# Patient Record
Sex: FEMALE | Race: WHITE | NOT HISPANIC OR LATINO | Employment: FULL TIME | URBAN - METROPOLITAN AREA
[De-identification: names, ages, dates, MRNs, and addresses within clinical notes are randomized per-mention and may not be internally consistent; named-entity substitution may affect disease eponyms.]

---

## 2018-01-13 NOTE — PROGRESS NOTES
Assessment    1  Encounter for preventive health examination (V70 0) (Z00 00)   2  Vitamin D deficiency (268 9) (E55 9)    Plan  Encounter for PPD test    · PPD    Discussion/Summary  healthy adult female Currently, she eats an adequate diet  cervical cancer screening is current Breast cancer screening: breast cancer screening is not indicated  Colorectal cancer screening: colorectal cancer screening is not indicated  Osteoporosis screening: bone mineral density testing is not indicated  The immunizations are up to date  Advice and education were given regarding nutrition, aerobic exercise and vitamin D supplements  Patient discussion: discussed with the patient  1  Vitamin D deficiency  On daily supplement  2  Skin lesions, nevi  Sees dermatology yearly  3  HM  Updated  Pre employment paperwork completed  PPD today  Follow up in 1 year or prn  The patient was counseled regarding instructions for management, impressions  Chief Complaint  Pt is here for physical and to have TB test       History of Present Illness  , Adult Female: The patient is being seen for a health maintenance evaluation  General Health: The patient's health since the last visit is described as good  She has regular dental visits  She denies vision problems  Immunizations status: up to date  Lifestyle:  She consumes a diverse and healthy diet  She does not have any weight concerns  She does not exercise regularly  Reproductive health:  she reports normal menses  Screening: Cervical cancer screening includes a pap smear performed   Breast cancer screening includes no previous mammogram  She hasn't been previously screened for colorectal cancer  Metabolic screening includes lipid profile performed within the past five years, glucose screening performed 2013, thyroid function test performed 2013 and no previous DEXA  Cardiovascular risk factors: no obesity and no sedentary lifestyle     HPI: Kim Melgar feels well, has no complaints  Vitamin D Deficiency: The patient is being seen for follow-up of vitamin D deficiency  Disease type: vitamin D insufficiency  Current treatment includes vitamin D3 (cholecalciferol)  The patient is currently asymptomatic  Review of Systems    Constitutional: not feeling poorly and not feeling tired  Eyes: no eyesight problems  ENT: nasal discharge, but no earache  Cardiovascular: no chest pain, no palpitations and no lower extremity edema  Respiratory: no shortness of breath and no cough  Gastrointestinal: no abdominal pain and no constipation  Genitourinary: no dysuria and no incontinence  Musculoskeletal: no arthralgias and no myalgias  Neurological: no tingling and no dizziness  Psychiatric: no sleep disturbances  no feelings of weakness      Active Problems    1  Contact dermatitis due to plant (692 6) (L25 5)   2  Encounter for PPD test (V74 1) (Z11 1)   3  Palpitations (785 1) (R00 2)   4  Vitamin D deficiency (268 9) (E55 9)    Past Medical History    · History of Encounter for health supervision or care of foundling (V20 0) (Z76 1)   · History of acute bronchitis (V12 69) (Z87 09)   · History of Limb pain (729 5) (M79 609)    Family History  Maternal Grandfather    · Family history of Breast Cancer (V16 3)   · Family history of Malignant Melanoma Of The Skin (V16 8)  Paternal Grandfather    · Family history of Hypertension (V17 49)  Family History    · Family history of Diabetes Mellitus (V18 0)   · Family history of Heart Disease (V17 49)   · Family history of Stroke Syndrome (V17 1)    Social History    · Caffeine Use   ·    · Never A Smoker   · Never Drank Alcohol   · Work History   · applying for     Current Meds   1  Vitamin D3 2000 UNIT Oral Tablet; Take 1 tablet daily; Therapy: 85QZL8835 to (Evaluate:30Kek9905); Last Rx:29Jan2013 Ordered    Allergies    1   No Known Drug Allergies    Vitals   Recorded: [id number] 11:31AM   Heart Rate 75   Respiration 16   Systolic 953   Diastolic 80   Height 5 ft 11 in   Weight 153 lb 8 0 oz   BMI Calculated 21 41   BSA Calculated 1 88   O2 Saturation 96     Physical Exam    Constitutional   General appearance: No acute distress, well appearing and well nourished  appears healthy, comfortable, clothing appropriate and well hydrated  Head and Face   Head and face: Normal     Eyes   Pupils and irises: Equal, round, reactive to light  Ears, Nose, Mouth, and Throat   External inspection of ears and nose: Normal     Otoscopic examination: Tympanic membranes translucent with normal light reflex  Canals patent without erythema  Nasal mucosa, septum, and turbinates: Abnormal   There was clear rhinorrhea from both nares  The bilateral nasal mucosa was red  Oropharynx: Normal with no erythema, edema, exudate or lesions  Neck   Neck: Supple, symmetric, trachea midline, no masses  Thyroid: Normal, no thyromegaly  Pulmonary   Respiratory effort: No increased work of breathing or signs of respiratory distress  Auscultation of lungs: Clear to auscultation  Cardiovascular   Auscultation of heart: Normal rate and rhythm, normal S1 and S2, no murmurs  Examination of extremities for edema and/or varicosities: Normal     Abdomen   Abdomen: Non-tender, no masses  Lymphatic   Palpation of lymph nodes in neck: No lymphadenopathy  Musculoskeletal   Gait and station: Normal     Skin   Skin and subcutaneous tissue: Normal without rashes or lesions  Neurologic   Cortical function: Normal mental status      Psychiatric   Orientation to person, place, and time: Normal     Mood and affect: Normal        Results/Data  eCalcs - Health Calculators [id number] 11:33AM User, Apprisss     Test Name Result Flag Reference   SBIRT Screen - Tobacco Screening Result Negative       PHQ-2 Adult Depression Screening 13Jun2016 11:33AM User, Ahs     Test Name Result Flag Reference   PHQ-2 Adult Depression Score 0     Over the last two weeks, how often have you been bothered by any of the following problems?   Little interest or pleasure in doing things: Not at all - 0  Feeling down, depressed, or hopeless: Not at all - 0   PHQ-2 Adult Depression Screening Negative         Signatures   Electronically signed by : Robina Powell MD; Jun 13 2016 12:10PM EST                       (Author)

## 2018-08-29 ENCOUNTER — OFFICE VISIT (OUTPATIENT)
Dept: OBGYN CLINIC | Facility: CLINIC | Age: 28
End: 2018-08-29
Payer: COMMERCIAL

## 2018-08-29 VITALS
WEIGHT: 155 LBS | SYSTOLIC BLOOD PRESSURE: 126 MMHG | DIASTOLIC BLOOD PRESSURE: 72 MMHG | BODY MASS INDEX: 23.49 KG/M2 | HEIGHT: 68 IN

## 2018-08-29 DIAGNOSIS — O02.1 EMBRYONIC DEMISE: Primary | ICD-10-CM

## 2018-08-29 PROCEDURE — 99204 OFFICE O/P NEW MOD 45 MIN: CPT | Performed by: OBSTETRICS & GYNECOLOGY

## 2018-08-29 PROCEDURE — 76817 TRANSVAGINAL US OBSTETRIC: CPT | Performed by: OBSTETRICS & GYNECOLOGY

## 2018-08-29 NOTE — PROGRESS NOTES
Assessment/Plan:    No problem-specific Assessment & Plan notes found for this encounter  Diagnoses and all orders for this visit:    Embryonic demise    Other orders  -     Prenatal MV-Min-Fe Fum-FA-DHA (PRENATAL+DHA PO); Take by mouth        Extended discussion was had with this couple regarding the confirmatory findings of twin embryonic demise  We discussed multiple options including expectant management, misoprostol medical management as well as D and E  we discussed the pros and cons of each option  Patient remains a candidate for medical management and appears to be leaning in that direction  She notes that she is blood type Rh positive  We have planned to have her back in 48 hr to have placement of Cytotec and additional counseling at that time  She was advised that bleeding may be heavy but we did review the typical chronology of events and the risks of approaching this from a medical perspective  All of the questions seemed to be answered to their satisfaction and they will return as scheduled  This visit was for 45 min with greater than 50% that afforded counseling and coordination of care  Subjective:      Patient ID: Nell Price is a 34 y o  female  Patient presents as a new patient accompanied by her  for 2nd opinion  She is patient [facility] recently seen for a early twin pregnancy that appears to have a twin demise  She initially was found to have 2 embryos with cardiac activity at 5 weeks 4 days gestation  She was subsequently seen at [facility] 10 days later and was found to have twin embryonic demise  She was scheduled for D&E the following day  Patient  canceled that D&E as they were not emotionally prepared to proceed with surgical intervention and they had not felt that they been fully counseled regarding the our options  The patient continues to have pregnancy symptoms including mastalgia and nausea  She has no bleeding  She is doing well emotionally  The following portions of the patient's history were reviewed and updated as appropriate:   She  has no past medical history on file  She   Patient Active Problem List    Diagnosis Date Noted    Embryonic demise 08/29/2018     She  has a past surgical history that includes Finger surgery (Right, 06/2008)  Her family history includes Diabetes in her maternal grandfather and maternal grandmother; Hypertension in her maternal grandfather, maternal grandmother, paternal grandfather, and paternal grandmother; Migraines in her brother and mother; Skin cancer in her maternal grandfather and maternal grandmother  She  reports that she has never smoked  She has never used smokeless tobacco  She reports that she does not drink alcohol or use drugs  Current Outpatient Prescriptions   Medication Sig Dispense Refill    Prenatal MV-Min-Fe Fum-FA-DHA (PRENATAL+DHA PO) Take by mouth       No current facility-administered medications for this visit  No current outpatient prescriptions on file prior to visit  No current facility-administered medications on file prior to visit  She has No Known Allergies       Review of Systems   All other systems reviewed and are negative  Objective:      /72   Ht 5' 8 25" (1 734 m)   Wt 70 3 kg (155 lb)   LMP 06/10/2018 (Exact Date)   BMI 23 40 kg/m²          Physical Exam   Constitutional: She is oriented to person, place, and time  She appears well-developed and well-nourished  Neck: Neck supple  Abdominal: Soft  There is no tenderness  Genitourinary: Vagina normal and uterus normal    Genitourinary Comments: Cervix closed without lesions or bleeding   Neurological: She is alert and oriented to person, place, and time  Skin: Skin is warm and dry  Psychiatric: She has a normal mood and affect   Her behavior is normal  Judgment and thought content normal        Early OB Ultrasound Procedure Note  Ochoa Lubin Rafal  YOB: 1990      Referring Physician: Olaf Kirkpatrick MD     Technician: Study performed by the interpreting physician    Indications:  amenorrhea   /72   Ht 5' 8 25" (1 734 m)   Wt 70 3 kg (155 lb)   LMP 06/10/2018 (Exact Date)   BMI 23 40 kg/m²     Patient's last menstrual period was 06/10/2018 (exact date)  ,     Patient denies vaginal bleeding or brown discharge      Procedure Details  2 gestational sacs are  seen containing a yolk sac and twin embryos    Subchorionic lucency is absent    The embryonic crown-rump length measures 0 89  cm  and calculates to an estimated gestational age of 7 weeks and 6   Days    Second embryo is seen and measures 1 11cm c/w 7w2d EGA   Embryonic cardiac activity is  absent  Description of fetal anatomy Normal  Description of ovaries: normal  Description of uterus: normal    Findings:  Twin embryonic demise

## 2018-08-31 ENCOUNTER — OFFICE VISIT (OUTPATIENT)
Dept: OBGYN CLINIC | Facility: MEDICAL CENTER | Age: 28
End: 2018-08-31
Payer: COMMERCIAL

## 2018-08-31 VITALS — BODY MASS INDEX: 23.4 KG/M2 | SYSTOLIC BLOOD PRESSURE: 130 MMHG | DIASTOLIC BLOOD PRESSURE: 82 MMHG | WEIGHT: 155 LBS

## 2018-08-31 DIAGNOSIS — O02.1 EMBRYONIC DEMISE: Primary | ICD-10-CM

## 2018-08-31 PROCEDURE — 99214 OFFICE O/P EST MOD 30 MIN: CPT | Performed by: OBSTETRICS & GYNECOLOGY

## 2018-08-31 RX ORDER — MISOPROSTOL 200 UG/1
800 TABLET ORAL ONCE
Status: DISCONTINUED | OUTPATIENT
Start: 2018-08-31 | End: 2018-08-31

## 2018-08-31 RX ORDER — OXYCODONE HYDROCHLORIDE AND ACETAMINOPHEN 5; 325 MG/1; MG/1
1 TABLET ORAL EVERY 4 HOURS PRN
Qty: 3 TABLET | Refills: 0 | Status: SHIPPED | OUTPATIENT
Start: 2018-08-31 | End: 2019-01-25 | Stop reason: ALTCHOICE

## 2018-08-31 NOTE — PROGRESS NOTES
Assessment/Plan:      Diagnoses and all orders for this visit:    Embryonic demise          Patient had misoprostol 800 mcg placed  Intravaginally  The tablets were moist and  Patient was allowed to lie supine for 10 minutes in the office following placement  We did review the typical sequence of events  As well as precautions  The patient was given a sample of misoprostol 200 mcg to take home with her to take orally should she feel that her bleeding is excessive  Patient and  were given criteria as to when to call with concern  There is scheduled tentatively to return in 1 week for confirmatory ultrasound  This visit was for 25 minutes with greater than 50% of that afforded counseling and coordination of care  Subjective:     Patient ID: Nicky Luciano is a 34 y o  female  Patient returns with her  for placement of Cytotec  She remains interested in proceeding with medical management of her twin embryonic demise  She has had no bleeding or cramping but has noted a decline in her pregnancy symptoms  She is doing well emotionally  Review of Systems   All other systems reviewed and are negative  Objective:     Physical Exam   Constitutional: She is oriented to person, place, and time  She appears well-developed and well-nourished  Abdominal: Soft  There is no tenderness  Genitourinary: Vagina normal and uterus normal    Neurological: She is alert and oriented to person, place, and time  Skin: Skin is warm and dry  Psychiatric: She has a normal mood and affect   Her behavior is normal  Judgment and thought content normal

## 2018-09-07 ENCOUNTER — OFFICE VISIT (OUTPATIENT)
Dept: OBGYN CLINIC | Facility: MEDICAL CENTER | Age: 28
End: 2018-09-07
Payer: COMMERCIAL

## 2018-09-07 VITALS — WEIGHT: 163 LBS | DIASTOLIC BLOOD PRESSURE: 76 MMHG | BODY MASS INDEX: 24.6 KG/M2 | SYSTOLIC BLOOD PRESSURE: 110 MMHG

## 2018-09-07 DIAGNOSIS — O02.1 EMBRYONIC DEMISE: Primary | ICD-10-CM

## 2018-09-07 PROCEDURE — 99214 OFFICE O/P EST MOD 30 MIN: CPT | Performed by: OBSTETRICS & GYNECOLOGY

## 2018-09-07 PROCEDURE — 76817 TRANSVAGINAL US OBSTETRIC: CPT | Performed by: OBSTETRICS & GYNECOLOGY

## 2018-09-07 NOTE — PROGRESS NOTES
Assessment/Plan:      Diagnoses and all orders for this visit:    Embryonic demise        We discussed today's findings and the evidence of passage of the twin gestational sac  We discussed the likelihood that she will have some bleeding for the next 2-4 weeks and will then have initial menses in the next 6-8 weeks  Patient  do appeared to be interested in conceiving in the near future  I have suggested calling with her 1st positive home pregnancy test so that we may perform quantitative HCG and early confirmatory ultrasound  Patient was also counseled on initiation of progesterone supplementation with her next pregnancy and she was agreeable  She will return to the office in April for her annual visit or earlier if pregnant  Total time for this visit was 25 minutes with greater than 50% of that afforded counseling and coordination of care  Subjective:     Patient ID: Savita Shoemaker is a 34 y o  female  And has been return for follow-up of embryonic demise of twin pregnancy  She is status post misoprostol placement  She did remark that she had the typical chronology of events  She felt that she passed tissue within about 5 hours of placement  She has had some irregular bleeding  She denies any fever or chills  She is doing well emotionally  Review of Systems   All other systems reviewed and are negative  Objective:     Physical Exam   Constitutional: She appears well-developed and well-nourished  Abdominal: Soft  There is no tenderness  Genitourinary: Vagina normal and uterus normal    Genitourinary Comments: Cervix closed without tissue at the os   Skin: Skin is warm and dry  Psychiatric: She has a normal mood and affect  Her behavior is normal  Judgment and thought content normal          Endovaginal ultrasound    No evidence of intrauterine gestational sac  Endometrial thickness now measuring 1 12 cm in greatest dimension with a heterogeneous appearance    No free peritoneal fluid

## 2019-01-25 ENCOUNTER — TELEPHONE (OUTPATIENT)
Dept: OBGYN CLINIC | Facility: CLINIC | Age: 29
End: 2019-01-25

## 2019-01-25 ENCOUNTER — APPOINTMENT (OUTPATIENT)
Dept: LAB | Facility: MEDICAL CENTER | Age: 29
End: 2019-01-25
Payer: COMMERCIAL

## 2019-01-25 ENCOUNTER — OFFICE VISIT (OUTPATIENT)
Dept: OBGYN CLINIC | Facility: MEDICAL CENTER | Age: 29
End: 2019-01-25
Payer: COMMERCIAL

## 2019-01-25 VITALS
WEIGHT: 163 LBS | SYSTOLIC BLOOD PRESSURE: 120 MMHG | HEIGHT: 71 IN | DIASTOLIC BLOOD PRESSURE: 70 MMHG | BODY MASS INDEX: 22.82 KG/M2

## 2019-01-25 DIAGNOSIS — N92.6 IRREGULAR MENSES: Primary | ICD-10-CM

## 2019-01-25 DIAGNOSIS — N92.6 IRREGULAR MENSES: ICD-10-CM

## 2019-01-25 LAB
B-HCG SERPL-ACNC: <2 MIU/ML
FSH SERPL-ACNC: 5.2 MIU/ML
PROLACTIN SERPL-MCNC: 15.1 NG/ML
SL AMB POCT URINE HCG: NORMAL
TSH SERPL DL<=0.05 MIU/L-ACNC: 1.54 UIU/ML (ref 0.36–3.74)

## 2019-01-25 PROCEDURE — 83001 ASSAY OF GONADOTROPIN (FSH): CPT

## 2019-01-25 PROCEDURE — 84146 ASSAY OF PROLACTIN: CPT

## 2019-01-25 PROCEDURE — 36415 COLL VENOUS BLD VENIPUNCTURE: CPT

## 2019-01-25 PROCEDURE — 99213 OFFICE O/P EST LOW 20 MIN: CPT | Performed by: PHYSICIAN ASSISTANT

## 2019-01-25 PROCEDURE — 81025 URINE PREGNANCY TEST: CPT | Performed by: PHYSICIAN ASSISTANT

## 2019-01-25 PROCEDURE — 84443 ASSAY THYROID STIM HORMONE: CPT

## 2019-01-25 PROCEDURE — 84702 CHORIONIC GONADOTROPIN TEST: CPT

## 2019-01-25 RX ORDER — MEDROXYPROGESTERONE ACETATE 10 MG/1
10 TABLET ORAL DAILY
Qty: 10 TABLET | Refills: 0 | Status: SHIPPED | OUTPATIENT
Start: 2019-01-25 | End: 2019-04-16 | Stop reason: ALTCHOICE

## 2019-01-25 RX ORDER — MAG HYDROX/ALUMINUM HYD/SIMETH 400-400-40
SUSPENSION, ORAL (FINAL DOSE FORM) ORAL DAILY
COMMUNITY
End: 2021-11-22

## 2019-01-25 NOTE — PROGRESS NOTES
Assessment/Plan:    Irregular menses  Neg UPT in office  Will check labs  Discussed use of Provera to induce menses and tracking cycles going forward    Pt hoping to conceive in near future, enc use of OTC ovulation kit, BBT, starting PNV, teratogen avoidance         Diagnoses and all orders for this visit:    Irregular menses  -     POCT urine HCG  -     Cancel: TSH, 3rd generation; Future  -     Prolactin; Future  -     hCG, quantitative; Future  -     TSH, 3rd generation; Future  -     Follicle stimulating hormone; Future  -     medroxyPROGESTERone (PROVERA) 10 mg tablet; Take 1 tablet (10 mg total) by mouth daily    Other orders  -     Cholecalciferol (VITAMIN D3) 5000 units CAPS;         Subjective:      Patient ID: Ingrid Marquez is a 34 y o  female  Pt presents for eval of irreg menses  She is s/p twin embryonic demise between 6-7 wks gestation in Aug 2018  Cytotec was placed in office and f/u US confirmed no POC remained     Since that time, states she had menses in Oct 2018 but no menses since   Does have h/o irreg menses and had has workup w/ [facility] previously suggestive of PCOS (results rev'd in chart)  Typical cycles were approx q60 days    No pelvic pain, vag d/c  Is hoping to TTC in near future w/             The following portions of the patient's history were reviewed and updated as appropriate: allergies, current medications, past family history, past medical history, past social history, past surgical history and problem list     Review of Systems   Constitutional: Negative for appetite change, fatigue and unexpected weight change  Respiratory: Negative for chest tightness and shortness of breath  Cardiovascular: Negative for chest pain, palpitations and leg swelling  Gastrointestinal: Negative for abdominal pain, constipation, diarrhea, nausea and vomiting  Genitourinary: Positive for menstrual problem   Negative for difficulty urinating, dyspareunia, pelvic pain and vaginal discharge  Musculoskeletal: Negative for arthralgias and myalgias  Neurological: Negative for dizziness, light-headedness and headaches  Psychiatric/Behavioral: Negative for dysphoric mood  The patient is not nervous/anxious  All other systems reviewed and are negative  Objective:      /70 (BP Location: Left arm, Patient Position: Sitting)   Ht 5' 11" (1 803 m)   Wt 73 9 kg (163 lb)   LMP 10/22/2018   Breastfeeding? Unknown   BMI 22 73 kg/m²          Physical Exam   Constitutional: She is oriented to person, place, and time  She appears well-developed and well-nourished  HENT:   Head: Normocephalic and atraumatic  Neurological: She is alert and oriented to person, place, and time  Skin: Skin is warm and dry  Psychiatric: She has a normal mood and affect  Nursing note and vitals reviewed

## 2019-01-25 NOTE — ASSESSMENT & PLAN NOTE
Neg UPT in office  Will check labs  Discussed use of Provera to induce menses and tracking cycles going forward    Pt hoping to conceive in near future, enc use of OTC ovulation kit, BBT, starting PNV, teratogen avoidance

## 2019-01-25 NOTE — TELEPHONE ENCOUNTER
----- Message from Hattie Garner PA-C sent at 1/25/2019  2:28 PM EST -----  All labs WNL, beta neg  Ok to take Provera as rx'd  thanks

## 2019-04-16 ENCOUNTER — ANNUAL EXAM (OUTPATIENT)
Dept: OBGYN CLINIC | Facility: CLINIC | Age: 29
End: 2019-04-16
Payer: COMMERCIAL

## 2019-04-16 VITALS
BODY MASS INDEX: 22.96 KG/M2 | WEIGHT: 164 LBS | SYSTOLIC BLOOD PRESSURE: 122 MMHG | HEIGHT: 71 IN | DIASTOLIC BLOOD PRESSURE: 80 MMHG

## 2019-04-16 DIAGNOSIS — Z01.419 ENCNTR FOR GYN EXAM (GENERAL) (ROUTINE) W/O ABN FINDINGS: ICD-10-CM

## 2019-04-16 PROBLEM — O02.1 EMBRYONIC DEMISE: Status: RESOLVED | Noted: 2018-08-29 | Resolved: 2019-04-16

## 2019-04-16 PROCEDURE — S0612 ANNUAL GYNECOLOGICAL EXAMINA: HCPCS | Performed by: PHYSICIAN ASSISTANT

## 2019-04-16 PROCEDURE — G0145 SCR C/V CYTO,THINLAYER,RESCR: HCPCS | Performed by: PHYSICIAN ASSISTANT

## 2019-04-22 LAB
LAB AP GYN PRIMARY INTERPRETATION: NORMAL
Lab: NORMAL

## 2019-09-23 ENCOUNTER — TELEPHONE (OUTPATIENT)
Dept: OBGYN CLINIC | Facility: CLINIC | Age: 29
End: 2019-09-23

## 2019-09-23 DIAGNOSIS — N91.2 AMENORRHEA: Primary | ICD-10-CM

## 2019-09-23 NOTE — TELEPHONE ENCOUNTER
Last Sept, pt had a twin demise  I know you often order meds in early preg  Her lmp 8/5  Is about 7 weeks pregnant  She has not made an appt as yet    What meds, etc

## 2019-09-23 NOTE — TELEPHONE ENCOUNTER
----- Message from Romelia Becerrilosevelt sent at 9/21/2019  8:17 AM EDT -----  Regarding: Non-Urgent Medical Question  Contact: [phone]  Hi Dr Errol Ignacio,    I took a home pregnancy test this morning and it was positive  Boni Kelly! When I miscarried last year, you had said I should let you know right away if I became pregnant again, so I wanted to reach out and see what the next steps are      Thanks,    Michael Tristan

## 2019-10-04 ENCOUNTER — PROCEDURE VISIT (OUTPATIENT)
Dept: OBGYN CLINIC | Facility: MEDICAL CENTER | Age: 29
End: 2019-10-04
Payer: COMMERCIAL

## 2019-10-04 VITALS
WEIGHT: 162.6 LBS | DIASTOLIC BLOOD PRESSURE: 78 MMHG | HEIGHT: 69 IN | SYSTOLIC BLOOD PRESSURE: 122 MMHG | BODY MASS INDEX: 24.08 KG/M2

## 2019-10-04 DIAGNOSIS — N91.2 AMENORRHEA: ICD-10-CM

## 2019-10-04 PROCEDURE — 99213 OFFICE O/P EST LOW 20 MIN: CPT | Performed by: STUDENT IN AN ORGANIZED HEALTH CARE EDUCATION/TRAINING PROGRAM

## 2019-10-04 PROCEDURE — 76817 TRANSVAGINAL US OBSTETRIC: CPT | Performed by: STUDENT IN AN ORGANIZED HEALTH CARE EDUCATION/TRAINING PROGRAM

## 2019-10-04 RX ORDER — ASPIRIN 81 MG/1
TABLET, CHEWABLE ORAL
COMMUNITY
Start: 2019-09-23 | End: 2019-10-15 | Stop reason: ALTCHOICE

## 2019-10-04 NOTE — PROGRESS NOTES
Technician: Study performed by the interpreting Practitioner    Indications:  amenorrhea           LMP 19             Procedure Details  A gestational sac is seen containing a yolk sac and a jett embryo  The embryonic crown-rump length measures 1 34cm  and calculates to an estimated gestational age of 9 weeks and 4 Days  Embryonic cardiac activity is present @ 152 BPM     Findings:   Viable, jett intrauterine pregnancy at 7weeks,  4 days, (change to [facility])  with a calculated EDC of 20    Uterus: 7 1x7  7x6 7cm  Left ovary: 1 9x2 9x2 0 cm  Right ovary: 2 6x2  3x2 1 cm    No adnexal masses or free fluid  Reviewed with patient to stay on prenatal  Also on progestin and aspirin per Dr Dieudonne Rice, ok to continue if desired  Next step intake visit       Niel Vanegas MD

## 2019-10-15 ENCOUNTER — INITIAL PRENATAL (OUTPATIENT)
Dept: OBGYN CLINIC | Facility: CLINIC | Age: 29
End: 2019-10-15
Payer: COMMERCIAL

## 2019-10-15 VITALS
HEART RATE: 86 BPM | BODY MASS INDEX: 24.71 KG/M2 | HEIGHT: 68 IN | SYSTOLIC BLOOD PRESSURE: 128 MMHG | DIASTOLIC BLOOD PRESSURE: 74 MMHG | WEIGHT: 163 LBS

## 2019-10-15 DIAGNOSIS — Z34.81 PRENATAL CARE, SUBSEQUENT PREGNANCY, FIRST TRIMESTER: Primary | ICD-10-CM

## 2019-10-15 DIAGNOSIS — Z23 NEED FOR INFLUENZA VACCINATION: ICD-10-CM

## 2019-10-15 PROCEDURE — OBC: Performed by: STUDENT IN AN ORGANIZED HEALTH CARE EDUCATION/TRAINING PROGRAM

## 2019-10-15 PROCEDURE — 90686 IIV4 VACC NO PRSV 0.5 ML IM: CPT | Performed by: STUDENT IN AN ORGANIZED HEALTH CARE EDUCATION/TRAINING PROGRAM

## 2019-10-15 PROCEDURE — 90471 IMMUNIZATION ADMIN: CPT | Performed by: STUDENT IN AN ORGANIZED HEALTH CARE EDUCATION/TRAINING PROGRAM

## 2019-10-15 RX ORDER — ASPIRIN 81 MG/1
162 TABLET ORAL DAILY
COMMUNITY
End: 2020-02-24 | Stop reason: ALTCHOICE

## 2019-10-15 NOTE — PATIENT INSTRUCTIONS
Pregnancy   AMBULATORY CARE:   What you need to know about pregnancy:  A normal pregnancy lasts about 40 weeks  The first trimester lasts from your last period through the 12th week of pregnancy  The second trimester lasts from the 13th week of your pregnancy through the 23rd week  The third trimester lasts from your 24th week of pregnancy until your baby is born  If you know the date of your last period, your healthcare provider can estimate your due date  You may give birth to your baby any time from 37 weeks to 2 weeks after your due date  Seek care immediately if:   · You develop a severe headache that does not go away  · You have new or increased vision changes, such as blurred or spotted vision  · You have new or increased swelling in your face or hands  · You have pain or cramping in your abdomen or low back  · You have vaginal bleeding  Contact your healthcare provider or obstetrician if:   · You have abdominal cramps, pressure, or tightening  · You have a change in vaginal discharge  · You cannot keep food or drinks down, and you are losing weight  · You have chills or a fever  · You have vaginal itching, burning, or pain  · You have yellow, green, white, or foul-smelling vaginal discharge  · You have pain or burning when you urinate, less urine than usual, or pink or bloody urine  · You have questions or concerns about your condition or care  Body changes that may occur during your pregnancy:   · Breast changes  you will experience include tenderness and tingling during the early part of your pregnancy  Your breasts will become larger  You may need to use a support bra  You may see a thin, yellow fluid, called colostrum, leak from your nipples during the second trimester  Colostrum is a liquid that changes to milk about 3 days after you give birth  · Skin changes and stretch marks  may occur during your pregnancy   You may have red marks, called stretch marks, on your skin  Stretch marks will usually fade after pregnancy  Use lotion if your skin is dry and itchy  The skin on your face, around your nipples, and below your belly button may darken  Most of the time, your skin will return to its normal color after your baby is born  · Morning sickness  is nausea and vomiting that can happen at any time of day  Avoid fatty and spicy foods  Eat small meals throughout the day instead of large meals  Olinda may help to decrease nausea  Ask your healthcare provider about other ways of decreasing nausea and vomiting  · Heartburn  may be caused by changes in your hormones during pregnancy  Your growing uterus may also push your stomach upward and force stomach acid to back up into your esophagus  Eat 4 or 5 small meals each day instead of large meals  Avoid spicy foods  Avoid eating right before bedtime  · Constipation  may develop during your pregnancy  To treat constipation, eat foods high in fiber such as fiber cereals, beans, fruits, vegetables, whole-grain breads, and prune juice  Get regular exercise and drink plenty of water  Your healthcare provider may also suggest a fiber supplement to soften your bowel movements  Talk to your healthcare provider before you use any medicines to decrease constipation  · Hemorrhoids  are enlarged veins in the rectal area  They may cause pain, itching, and bright red bleeding from your rectum  To decrease your risk of hemorrhoids, prevent constipation and do not strain to have a bowel movement  If you have hemorrhoids, soak in a tub of warm water to ease discomfort  Ask your healthcare provider how you can treat hemorrhoids  · Leg cramps and swelling  may be caused by low calcium levels or the added weight of pregnancy  Raise your legs above the level of your heart to decrease swelling  During a leg cramp, stretch or massage the muscle that has the cramp  Heat may help decrease pain and muscle spasms   Apply heat on your muscle for 20 to 30 minutes every 2 hours for as many days as directed  · Back pain  may occur as your baby grows  Do not stand for long periods of time or lift heavy items  Use good posture while you stand, squat, or bend  Wear low-heeled shoes with good support  Rest may also help to relieve back pain  Ask your healthcare provider about exercises you can do to strengthen your back muscles  Stay healthy during your pregnancy:   · Eat a variety of healthy foods  Healthy foods include fruits, vegetables, whole-grain breads, low-fat dairy foods, beans, lean meats, and fish  Drink liquids as directed  Ask how much liquid to drink each day and which liquids are best for you  Limit caffeine to less than 200 milligrams each day  Limit your intake of fish to 2 servings each week  Choose fish low in mercury such as canned light tuna, shrimp, crab, salmon, cod, or tilapia  Do not  eat fish high in mercury such as swordfish, tilefish, deyanira mackerel, and shark  · Take prenatal vitamins as directed  Your need for certain vitamins and minerals, such as folic acid, increases during pregnancy  Prenatal vitamins provide some of the extra vitamins and minerals you need  Prenatal vitamins may also help to decrease the risk of certain birth defects  · Ask how much weight you should gain during your pregnancy  Too much or too little weight gain can be unhealthy for you and your baby  · Talk to your healthcare provider about exercise  Moderate exercise can help you stay fit  Your healthcare provider will help you plan an exercise program that is safe for you during pregnancy  · Do not smoke  If you smoke, it is never too late to quit  Smoking increases your risk of a miscarriage and other health problems during your pregnancy  Smoking can cause your baby to be born too early or weigh less at birth  Ask your healthcare provider for information if you need help quitting  · Do not drink alcohol    Alcohol passes from your body to your baby through the placenta  It can affect your baby's brain development and cause fetal alcohol syndrome (FAS)  FAS is a group of conditions that causes mental, behavior, and growth problems  · Talk to your healthcare provider before you take any medicines  Many medicines may harm your baby if you take them when you are pregnant  Do not take any medicines, vitamins, herbs, or supplements without first talking to your healthcare provider  Never use illegal or street drugs (such as marijuana or cocaine) while you are pregnant  Safety tips:   · Avoid hot tubs and saunas  Do not use a hot tub or sauna while you are pregnant, especially during your first trimester  Hot tubs and saunas may raise your baby's temperature and increase the risk of birth defects  · Avoid toxoplasmosis  This is an infection caused by eating raw meat or being around infected cat feces  It can cause birth defects, miscarriages, and other problems  Wash your hands after you touch raw meat  Make sure any meat is well-cooked before you eat it  Avoid raw eggs and unpasteurized milk  Use gloves or ask someone else to clean your cat's litter box while you are pregnant  · Ask your healthcare provider about travel  The most comfortable time to travel is during the second trimester  Ask your healthcare provider if you can travel after 36 weeks  You may not be able to travel in an airplane after 36 weeks  He may also recommend that you avoid long road trips  Follow up with your healthcare provider or obstetrician as directed:  Go to all of your prenatal visits during your pregnancy  Write down your questions so you remember to ask them during your visits  © 2017 [employer] Information is for End User's use only and may not be sold, redistributed or otherwise used for commercial purposes   All illustrations and images included in CareNotes® are the copyrighted property of A D A M , Inc  or [employer]  The above information is an  only  It is not intended as medical advice for individual conditions or treatments  Talk to your doctor, nurse or pharmacist before following any medical regimen to see if it is safe and effective for you

## 2019-10-15 NOTE — PROGRESS NOTES
OB INTAKE INTERVIEW  * Pt presents for OB intake 9w1d  * Accompanied by: -Nathaniel  *  *Hx of  delivery prior to 36 weeks 6 days no  *Last Menstrual Period: Pt's LMP was 19  *Ultrasound date:10/4/19   7weeks 4days  *Estimated date of delivery: 20   * confirmed by US    *Signs/Symptoms of Pregnancy   *nausea, breast tenderness, fatigue   *constipation no   *headaches no   *cramping/spotting no   *PICA cravings no  *Diabetes- if you answer yes, please order 1 hour GTT, 50g   *hx of GDM no   *BMI >35 no   *first degree relative with type 2 diabetes no   *hx of PCOS no   *current metformin use no   *prior hx of LGA/macrosomia no   *AMA with other risk factors no  *Hypertension- if you answer yes, please order preeclampsia labs including 24 hour urine protein   *Hx of chronic HTN no   *hx of gestational HTN no   *hx of preeclampsia, eclampsia, or HELLP syndrome no  *Infection Screening-    *does the pt have a hx of MRSA? no   *if yes- please follow MRSA protocol and obtain a nasal swab for MRSA culture   *history of herpes? no  *Immunizations:   *influenza vaccine given today YES   *discussed Tdap vaccine-yes    *Interview education   *[facility] P[facility] Ess[facility] Book reviewed and discussed   *Handouts given:    *Baby and Me phone rojas guide-yes    *Baby and Me support center-yes    *[facility]     *discussed genetic testing- pt interested YES     *appointment at [place] [id number] made YES    *Prenatal lab work scripts advised to complete by 19    *Nurse/Family Partnership- pt may qualify no; referral placed no  *Substnace Abuse Screening 4Ps   Presently-NO   Past-No   Partner-No   Parents/Family-NO  *I have these concerns about this prenatal patient: planned pregnancy, couple had a SAB of twin 7/8 wk gest 2018  They are happy to be pregnant again  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 and [employer] works [employer] in [place]   She was only taking 81mg of ASA, reviewed Dr Rhodes Fossa not and advised pt that she should be taking 2-81mg ASA for  atotal of 162mg  She is also still taking the progesterone and has about a week left which would bring her to 10w gest which she states is what she was told  She is feeling well over all, has some nausea that comes and goes , No cramping or bleeding  *Details that I feel the provider should be aware of: Overall healthy couple-she states her headaches has improved with pregnancy  She has had irregular cycles most of her life ranging from 27-50 days  FOB does have family history or brother with transposition of great vessels and some kind of cardiac arrhythmia since birth and has required multiple procedures since infancy, a first cousin with Type I Diabetes since age 15, and another cousin born deaf in both ears  PN1 visit scheduled  The patient was oriented to our practice and all questions were answered   ARNOLD [facility] delivery     Interviewed by: Rodriguez Bedoya RN

## 2019-10-31 ENCOUNTER — APPOINTMENT (OUTPATIENT)
Dept: LAB | Facility: MEDICAL CENTER | Age: 29
End: 2019-10-31
Payer: COMMERCIAL

## 2019-10-31 DIAGNOSIS — Z34.81 PRENATAL CARE, SUBSEQUENT PREGNANCY, FIRST TRIMESTER: ICD-10-CM

## 2019-10-31 LAB
ABO GROUP BLD: NORMAL
BACTERIA UR QL AUTO: ABNORMAL /HPF
BASOPHILS # BLD AUTO: 0.02 THOUSANDS/ΜL (ref 0–0.1)
BASOPHILS NFR BLD AUTO: 0 % (ref 0–1)
BILIRUB UR QL STRIP: NEGATIVE
BLD GP AB SCN SERPL QL: NEGATIVE
CLARITY UR: CLEAR
COLOR UR: YELLOW
EOSINOPHIL # BLD AUTO: 0.09 THOUSAND/ΜL (ref 0–0.61)
EOSINOPHIL NFR BLD AUTO: 1 % (ref 0–6)
ERYTHROCYTE [DISTWIDTH] IN BLOOD BY AUTOMATED COUNT: 13.2 % (ref 11.6–15.1)
GLUCOSE UR STRIP-MCNC: NEGATIVE MG/DL
HBV SURFACE AG SER QL: NORMAL
HCT VFR BLD AUTO: 37.9 % (ref 34.8–46.1)
HGB BLD-MCNC: 12.8 G/DL (ref 11.5–15.4)
HGB UR QL STRIP.AUTO: NEGATIVE
HYALINE CASTS #/AREA URNS LPF: ABNORMAL /LPF
IMM GRANULOCYTES # BLD AUTO: 0.04 THOUSAND/UL (ref 0–0.2)
IMM GRANULOCYTES NFR BLD AUTO: 1 % (ref 0–2)
KETONES UR STRIP-MCNC: NEGATIVE MG/DL
LEUKOCYTE ESTERASE UR QL STRIP: ABNORMAL
LYMPHOCYTES # BLD AUTO: 1.71 THOUSANDS/ΜL (ref 0.6–4.47)
LYMPHOCYTES NFR BLD AUTO: 23 % (ref 14–44)
MCH RBC QN AUTO: 29.8 PG (ref 26.8–34.3)
MCHC RBC AUTO-ENTMCNC: 33.8 G/DL (ref 31.4–37.4)
MCV RBC AUTO: 88 FL (ref 82–98)
MONOCYTES # BLD AUTO: 0.42 THOUSAND/ΜL (ref 0.17–1.22)
MONOCYTES NFR BLD AUTO: 6 % (ref 4–12)
NEUTROPHILS # BLD AUTO: 5.24 THOUSANDS/ΜL (ref 1.85–7.62)
NEUTS SEG NFR BLD AUTO: 69 % (ref 43–75)
NITRITE UR QL STRIP: NEGATIVE
NON-SQ EPI CELLS URNS QL MICRO: ABNORMAL /HPF
NRBC BLD AUTO-RTO: 0 /100 WBCS
PH UR STRIP.AUTO: 6.5 [PH]
PLATELET # BLD AUTO: 212 THOUSANDS/UL (ref 149–390)
PMV BLD AUTO: 10.2 FL (ref 8.9–12.7)
PROT UR STRIP-MCNC: NEGATIVE MG/DL
RBC # BLD AUTO: 4.29 MILLION/UL (ref 3.81–5.12)
RBC #/AREA URNS AUTO: ABNORMAL /HPF
RH BLD: POSITIVE
RUBV IGG SERPL IA-ACNC: >175 IU/ML
SP GR UR STRIP.AUTO: 1.01 (ref 1–1.03)
UROBILINOGEN UR QL STRIP.AUTO: 0.2 E.U./DL
WBC # BLD AUTO: 7.52 THOUSAND/UL (ref 4.31–10.16)
WBC #/AREA URNS AUTO: ABNORMAL /HPF

## 2019-10-31 PROCEDURE — 87086 URINE CULTURE/COLONY COUNT: CPT

## 2019-10-31 PROCEDURE — 81001 URINALYSIS AUTO W/SCOPE: CPT

## 2019-10-31 PROCEDURE — 80081 OBSTETRIC PANEL INC HIV TSTG: CPT

## 2019-10-31 PROCEDURE — 36415 COLL VENOUS BLD VENIPUNCTURE: CPT

## 2019-11-01 LAB
BACTERIA UR CULT: NORMAL
HIV 1+2 AB+HIV1 P24 AG SERPL QL IA: NORMAL
RPR SER QL: NORMAL

## 2019-11-04 ENCOUNTER — TELEPHONE (OUTPATIENT)
Dept: OBGYN CLINIC | Facility: CLINIC | Age: 29
End: 2019-11-04

## 2019-11-04 ENCOUNTER — INITIAL PRENATAL (OUTPATIENT)
Dept: OBGYN CLINIC | Facility: MEDICAL CENTER | Age: 29
End: 2019-11-04

## 2019-11-04 VITALS — BODY MASS INDEX: 24.63 KG/M2 | SYSTOLIC BLOOD PRESSURE: 124 MMHG | DIASTOLIC BLOOD PRESSURE: 68 MMHG | WEIGHT: 162 LBS

## 2019-11-04 DIAGNOSIS — Z34.81 PRENATAL CARE, SUBSEQUENT PREGNANCY, FIRST TRIMESTER: Primary | ICD-10-CM

## 2019-11-04 DIAGNOSIS — Z34.91 ENCOUNTER FOR SUPERVISION OF LOW-RISK PREGNANCY IN FIRST TRIMESTER: ICD-10-CM

## 2019-11-04 DIAGNOSIS — Z11.3 SCREENING FOR STDS (SEXUALLY TRANSMITTED DISEASES): ICD-10-CM

## 2019-11-04 LAB
SL AMB  POCT GLUCOSE, UA: NEGATIVE
SL AMB POCT URINE PROTEIN: NEGATIVE

## 2019-11-04 PROCEDURE — PNV: Performed by: STUDENT IN AN ORGANIZED HEALTH CARE EDUCATION/TRAINING PROGRAM

## 2019-11-04 PROCEDURE — 87591 N.GONORRHOEAE DNA AMP PROB: CPT | Performed by: STUDENT IN AN ORGANIZED HEALTH CARE EDUCATION/TRAINING PROGRAM

## 2019-11-04 PROCEDURE — 87491 CHLMYD TRACH DNA AMP PROBE: CPT | Performed by: STUDENT IN AN ORGANIZED HEALTH CARE EDUCATION/TRAINING PROGRAM

## 2019-11-04 NOTE — TELEPHONE ENCOUNTER
----- Message from Grady Ferguson MD sent at 11/4/2019  9:36 AM EST -----  Please let Naun Most know that her prenatal labs were normal  Thanks!

## 2019-11-04 NOTE — PROGRESS NOTES
Pt presents for her PN1  Last pap 4/16/19 neg  Cultures collected today  C/o some nausea and vomiting

## 2019-11-04 NOTE — PROGRESS NOTES
Encounter for supervision of low-risk pregnancy in first trimester  33 yo  at 12+0 here for new OB  Feeling well  Occasional N/V but declines medications  No cramping or bleeding  Undecided on [facility] visit but has it scheduled for this week  Hx of Varicella as a child  Declines family history of fetal malformations or genetic disorders  Up to date with care  Return in 4 wks       Problem   Encounter for Supervision of Low-Risk Pregnancy in First Trimester    Estimated date of delivery: Estimated Date of Delivery:20 Changed by 7 week US  Flu shot 10/15/19    Prenatal Battery  A positive  Antibody negative  Rubella immune  RPR NR  HIV NR  Hep B NR  Hgb 12 8  Plt 212    Pap NILM   Urine Culture <19,000 MUG    [facility] 19

## 2019-11-04 NOTE — ASSESSMENT & PLAN NOTE
35 yo  at 12+0 here for new OB  Feeling well  Occasional N/V but declines medications  No cramping or bleeding  Undecided on [facility] visit but has it scheduled for this week  Hx of Varicella as a child  Declines family history of fetal malformations or genetic disorders  Up to date with care  Return in 4 wks

## 2019-11-06 LAB
C TRACH DNA SPEC QL NAA+PROBE: NEGATIVE
N GONORRHOEA DNA SPEC QL NAA+PROBE: NEGATIVE

## 2019-12-02 ENCOUNTER — ROUTINE PRENATAL (OUTPATIENT)
Dept: OBGYN CLINIC | Facility: MEDICAL CENTER | Age: 29
End: 2019-12-02

## 2019-12-02 VITALS — DIASTOLIC BLOOD PRESSURE: 74 MMHG | BODY MASS INDEX: 25.48 KG/M2 | SYSTOLIC BLOOD PRESSURE: 120 MMHG | WEIGHT: 167.6 LBS

## 2019-12-02 DIAGNOSIS — Z34.82 ENCOUNTER FOR SUPERVISION OF OTHER NORMAL PREGNANCY IN SECOND TRIMESTER: Primary | ICD-10-CM

## 2019-12-02 LAB
SL AMB  POCT GLUCOSE, UA: NORMAL
SL AMB POCT URINE PROTEIN: NORMAL

## 2019-12-02 PROCEDURE — PNV: Performed by: NURSE PRACTITIONER

## 2019-12-02 NOTE — ASSESSMENT & PLAN NOTE
34year old  presents today for routine prenatal visit  16w0d  Denies OB complaints  Denies contractions/cramping, leaking of fluid or vaginal bleeding  S/p flu vaccine  Reviewed reasons to call  RTO 4 weeks  Desires quad screen  Lab slip given

## 2019-12-02 NOTE — PATIENT INSTRUCTIONS
Pregnancy at 15 to 18 Weeks   [address]:   What changes are happening in my body? Now that you are in your second trimester, you have more energy  You may also feel hungrier than usual  You may start to experience other symptoms, such as heartburn or dizziness  You may be gaining about ½ to 1 pound a week, and your pregnancy is beginning to show  You may need to start wearing maternity clothes  How do I care for myself at this stage of my pregnancy? · Manage heartburn  by eating 4 or 5 small meals each day instead of large meals  Avoid spicy foods  Avoid eating right before bedtime  · Manage nausea and vomiting  Avoid fatty and spicy foods  Eat small meals throughout the day instead of large meals  Olinda may help to decrease nausea  Ask your healthcare provider about other ways of decreasing nausea and vomiting  · Eat a variety of healthy foods  Healthy foods include fruits, vegetables, whole-grain breads, low-fat dairy foods, beans, lean meats, and fish  Drink liquids as directed  Ask how much liquid to drink each day and which liquids are best for you  Limit caffeine to less than 200 milligrams each day  Limit your intake of fish to 2 servings each week  Choose fish low in mercury such as canned light tuna, shrimp, salmon, cod, or tilapia  Do not  eat fish high in mercury such as swordfish, tilefish, deyanira mackerel, and shark  · Take prenatal vitamins as directed  Your need for certain vitamins and minerals, such as folic acid, increases during pregnancy  Prenatal vitamins provide some of the extra vitamins and minerals you need  Prenatal vitamins may also help to decrease the risk of certain birth defects  · Do not smoke  If you smoke, it is never too late to quit  Smoking increases your risk of a miscarriage and other health problems during your pregnancy  Smoking can cause your baby to be born too early or weigh less at birth   Ask your healthcare provider for information if you need help quitting  · Do not drink alcohol  Alcohol passes from your body to your baby through the placenta  It can affect your baby's brain development and cause fetal alcohol syndrome (FAS)  FAS is a group of conditions that causes mental, behavior, and growth problems  · Talk to your healthcare provider before you take any medicines  Many medicines may harm your baby if you take them when you are pregnant  Do not take any medicines, vitamins, herbs, or supplements without first talking to your healthcare provider  Never use illegal or street drugs (such as marijuana or cocaine) while you are pregnant  What are some safety tips during pregnancy? · Avoid hot tubs and saunas  Do not use a hot tub or sauna while you are pregnant, especially during your first trimester  Hot tubs and saunas may raise your baby's temperature and increase the risk of birth defects  · Avoid toxoplasmosis  This is an infection caused by eating raw meat or being around infected cat feces  It can cause birth defects, miscarriages, and other problems  Wash your hands after you touch raw meat  Make sure any meat is well-cooked before you eat it  Avoid raw eggs and unpasteurized milk  Use gloves or ask someone else to clean your cat's litter box while you are pregnant  What changes are happening with my baby? By 18 weeks, your baby may be about 6 inches long from the top of the head to the rump (baby's bottom)  Your baby may weigh about 11 ounces  You may be able to feel your baby's movement at about 18 weeks or later  The first movements may not be that noticeable  They may feel like a fluttering sensation  Your baby also makes sucking movements and can hear certain sounds  What do I need to know about prenatal care? During the first 28 weeks of your pregnancy, you will see your healthcare provider once a month  Your healthcare provider will check your blood pressure and weight   You may also need any of the following:  · A urine test  may also be done to check for sugar and protein  These can be signs of gestational diabetes or infection  · A blood test  may be done to check for anemia (low iron level)  · Fundal height check  is a measurement of your uterus to check your baby's growth  This number is usually the same as the number of weeks that you have been pregnant  · An ultrasound  may be done to check your baby's development  Your healthcare provider may be able to tell you what your baby's gender is during the ultrasound  · Your baby's heart rate  will be checked  When should I seek immediate care? · You have pain or cramping in your abdomen or low back  · You have heavy vaginal bleeding or clotting  · You pass material that looks like tissue or large clots  Collect the material and bring it with you  When should I contact my healthcare provider? · You cannot keep food or drinks down, and you are losing weight  · You have light bleeding  · You have chills or a fever  · You have vaginal itching, burning, or pain  · You have yellow, green, white, or foul-smelling vaginal discharge  · You have pain or burning when you urinate, less urine than usual, or pink or bloody urine  · You have questions or concerns about your condition or care  CARE AGREEMENT:   You have the right to help plan your care  Learn about your health condition and how it may be treated  Discuss treatment options with your caregivers to decide what care you want to receive  You always have the right to refuse treatment  The above information is an  only  It is not intended as medical advice for individual conditions or treatments  Talk to your doctor, nurse or pharmacist before following any medical regimen to see if it is safe and effective for you    © 2017 [employer] Information is for End User's use only and may not be sold, redistributed or otherwise used for commercial purposes  All illustrations and images included in CareNotes® are the copyrighted property of A [employer] M , Inc  or Kwabena Dennis

## 2019-12-02 NOTE — PROGRESS NOTES
Patient denies any complaints, just some occasional nausea  Urine neg for glucose and protein  Flu vaccine 10/15/19

## 2019-12-03 NOTE — PROGRESS NOTES
Encounter for supervision of low-risk pregnancy in first trimester  34year old  presents today for routine prenatal visit  16w0d  Denies OB complaints  Denies contractions/cramping, leaking of fluid or vaginal bleeding  S/p flu vaccine  Reviewed reasons to call  RTO 4 weeks  Desires quad screen  Lab slip given  Will call to schedule anatomy scan at [facility]

## 2019-12-13 ENCOUNTER — TELEPHONE (OUTPATIENT)
Dept: OBGYN CLINIC | Facility: MEDICAL CENTER | Age: 29
End: 2019-12-13

## 2019-12-13 NOTE — TELEPHONE ENCOUNTER
Pt called stating she" has her Quad screen lab slip, but states is concerned because it  is not signed "  Advised pt that order is in epic, & if the lab has questions, can call office to confirm

## 2019-12-18 ENCOUNTER — TELEPHONE (OUTPATIENT)
Dept: OBGYN CLINIC | Facility: MEDICAL CENTER | Age: 29
End: 2019-12-18

## 2019-12-18 LAB
2ND TRIMESTER 4 SCREEN SERPL-IMP: NORMAL
2ND TRIMESTER 4 SCREEN SERPL-IMP: NORMAL
AFP ADJ MOM SERPL: 0.97
AFP SERPL-MCNC: 37.4 NG/ML
AGE AT DELIVERY: 27.9 YR
FET TS 18 RISK FROM MAT AGE: NORMAL
FET TS 21 RISK FROM MAT AGE: 864
GA METHOD: NORMAL
GA: 17.7 WEEKS
HCG ADJ MOM SERPL: 0.42
HCG SERPL-ACNC: NORMAL MIU/ML
IDDM PATIENT QL: NO
INHIBIN A ADJ MOM SERPL: 0.61
INHIBIN A SERPL-MCNC: 94.54 PG/ML
KARYOTYP BLD/T: NORMAL
MULTIPLE PREGNANCY: NO
NEURAL TUBE DEFECT RISK FETUS: NORMAL %
SERVICE CMNT-IMP: NORMAL
TS 18 RISK FETUS: NORMAL
TS 21 RISK FETUS: NORMAL
U ESTRIOL ADJ MOM SERPL: 1.09
U ESTRIOL SERPL-MCNC: 1.34 NG/ML

## 2019-12-31 ENCOUNTER — ROUTINE PRENATAL (OUTPATIENT)
Dept: OBGYN CLINIC | Facility: MEDICAL CENTER | Age: 29
End: 2019-12-31

## 2019-12-31 VITALS — BODY MASS INDEX: 26.24 KG/M2 | SYSTOLIC BLOOD PRESSURE: 108 MMHG | DIASTOLIC BLOOD PRESSURE: 84 MMHG | WEIGHT: 172.6 LBS

## 2019-12-31 DIAGNOSIS — Z34.82 ENCOUNTER FOR SUPERVISION OF OTHER NORMAL PREGNANCY IN SECOND TRIMESTER: Primary | ICD-10-CM

## 2019-12-31 LAB
SL AMB  POCT GLUCOSE, UA: NORMAL
SL AMB POCT URINE PROTEIN: NORMAL

## 2019-12-31 PROCEDURE — PNV: Performed by: NURSE PRACTITIONER

## 2019-12-31 NOTE — PROGRESS NOTES
Encounter for supervision of low-risk pregnancy in first trimester  Rodger Gonsales is a 34year old  @ 20w1d who presents for routine prenatal   Denies OB complaints  Good fetal movement x 1 week  Denies contractions, cramping, leakage of fluid or vaginal bleeding  S/p flu vaccine  She had a negative quad screen and is scheduled for her anatomy scan on 20  She is taking baby ASA per Dr Betty Chauhan for her history of one twin loss @ 7/8 weeks  Reviewed reasons to call  RTO 4 weeks  Delivering at [facility]  Submitted birth plan

## 2019-12-31 NOTE — PROGRESS NOTES
Patient denies any LOF, bleeding, or cramping  Level II U/S scheduled for 1/7/20  Flu vaccine 10/15/19  Quad screen done 12/14  Urine neg for glucose and protein

## 2019-12-31 NOTE — ASSESSMENT & PLAN NOTE
Vanita Conrad is a 34year old  @ 20w1d who presents for routine prenatal   Denies OB complaints  Good fetal movement x 1 week  Denies contractions, cramping, leakage of fluid or vaginal bleeding  S/p flu vaccine  She had a negative quad screen and is scheduled for her anatomy scan on 20  She is taking baby ASA per Dr Jorge Tristan for her history of one twin loss @ 7/8 weeks  Reviewed reasons to call  RTO 4 weeks  Delivering at [facility] birth plan

## 2019-12-31 NOTE — PATIENT INSTRUCTIONS
Pregnancy at 23 to 22 [address]:   What changes are happening in my body? Now that you are in your second trimester, you have more energy  You may also be feeling hungrier than usual  You may be gaining about ½ to 1 pound a week, and your pregnancy is beginning to show  You may need to start wearing maternity clothes  As your baby gets larger, you may have other symptoms  These may include body aches or stretch marks on your abdomen, breasts, thighs, or buttocks  How do I care for myself at this stage of my pregnancy? · Eat a variety of healthy foods  Healthy foods include fruits, vegetables, whole-grain breads, low-fat dairy foods, beans, lean meats, and fish  Drink liquids as directed  Ask how much liquid to drink each day and which liquids are best for you  Limit caffeine to less than 200 milligrams each day  Limit your intake of fish to 2 servings each week  Choose fish low in mercury such as canned light tuna, shrimp, salmon, cod, or tilapia  Do not  eat fish high in mercury such as swordfish, tilefish, deyanira mackerel, and shark  · Take prenatal vitamins as directed  Your need for certain vitamins and minerals, such as folic acid, increases during pregnancy  Prenatal vitamins provide some of the extra vitamins and minerals you need  Prenatal vitamins may also help to decrease the risk of certain birth defects  · Talk to your healthcare provider about exercise  Moderate exercise can help you stay fit  Your healthcare provider will help you plan an exercise program that is safe for you during pregnancy  · Do not smoke  If you smoke, it is never too late to quit  Smoking increases your risk of a miscarriage and other health problems during your pregnancy  Smoking can cause your baby to be born too early or weigh less at birth  Ask your healthcare provider for information if you need help quitting  · Do not drink alcohol    Alcohol passes from your body to your baby through the placenta  It can affect your baby's brain development and cause fetal alcohol syndrome (FAS)  FAS is a group of conditions that causes mental, behavior, and growth problems  · Talk to your healthcare provider before you take any medicines  Many medicines may harm your baby if you take them when you are pregnant  Do not take any medicines, vitamins, herbs, or supplements without first talking to your healthcare provider  Never use illegal or street drugs (such as marijuana or cocaine) while you are pregnant  What are some safety tips during pregnancy? · Avoid hot tubs and saunas  Do not use a hot tub or sauna while you are pregnant, especially during your first trimester  Hot tubs and saunas may raise your baby's temperature and increase the risk of birth defects  · Avoid toxoplasmosis  This is an infection caused by eating raw meat or being around infected cat feces  It can cause birth defects, miscarriages, and other problems  Wash your hands after you touch raw meat  Make sure any meat is well-cooked before you eat it  Avoid raw eggs and unpasteurized milk  Use gloves or ask someone else to clean your cat's litter box while you are pregnant  What changes are happening with my baby? By 22 weeks, your baby is about 8 inches long from the top of the head to the rump (baby's bottom)  Your baby also weighs about 1 pound  Your baby is becoming much more active  You may be able to feel the baby move inside you now  The first movements may not be that noticeable  They may feel like a fluttering sensation  As time goes on, your baby's movements will become stronger and more noticeable  What do I need to know about prenatal care? During the first 28 weeks of your pregnancy, you will see your healthcare provider once a month  Your healthcare provider will check your blood pressure and weight  You may also need the following:  · A urine test  may also be done to check for sugar and protein   These can be signs of gestational diabetes or infection  Protein in your urine may also be a sign of preeclampsia  Preeclampsia is a condition that can develop during week 20 or later of your pregnancy  It causes high blood pressure, and it can cause problems with your kidneys and other organs  · Fundal height  is a measurement of your uterus to check your baby's growth  This number is usually the same as the number of weeks that you have been pregnant  · A fetal ultrasound  shows pictures of your baby inside your uterus  It shows your baby's development  The movement and position of your baby can also be seen  Your healthcare provider may be able to tell you what your baby's gender is during the ultrasound  · Your baby's heart rate  will be checked  When should I seek immediate care? · You develop a severe headache that does not go away  · You have new or increased vision changes, such as blurred or spotted vision  · You have new or increased swelling in your face or hands  · You have vaginal spotting or bleeding  · Your water broke or you feel warm water gushing or trickling from your vagina  When should I contact my healthcare provider? · You have abdominal cramps, pressure, or tightening  · You have a change in vaginal discharge  · You cannot keep food or drinks down, and you are losing weight  · You have chills or a fever  · You have vaginal itching, burning, or pain  · You have yellow, green, white, or foul-smelling vaginal discharge  · You have pain or burning when you urinate, less urine than usual, or pink or bloody urine  · You have questions or concerns about your condition or care  CARE AGREEMENT:   You have the right to help plan your care  Learn about your health condition and how it may be treated  Discuss treatment options with your caregivers to decide what care you want to receive  You always have the right to refuse treatment   The above information is an  only  It is not intended as medical advice for individual conditions or treatments  Talk to your doctor, nurse or pharmacist before following any medical regimen to see if it is safe and effective for you  © 2017 [address] [employer] Information is for End User's use only and may not be sold, redistributed or otherwise used for commercial purposes  All illustrations and images included in CareNotes® are the copyrighted property of A D A M , Inc  or [employer]

## 2020-01-07 ENCOUNTER — ROUTINE PRENATAL (OUTPATIENT)
Dept: PERINATAL CARE | Facility: OTHER | Age: 30
End: 2020-01-07
Payer: COMMERCIAL

## 2020-01-07 VITALS
WEIGHT: 173.4 LBS | HEART RATE: 109 BPM | DIASTOLIC BLOOD PRESSURE: 69 MMHG | HEIGHT: 68 IN | SYSTOLIC BLOOD PRESSURE: 114 MMHG | BODY MASS INDEX: 26.28 KG/M2

## 2020-01-07 DIAGNOSIS — Z36.86 ENCOUNTER FOR ANTENATAL SCREENING FOR CERVICAL LENGTH: ICD-10-CM

## 2020-01-07 DIAGNOSIS — Z34.81 PRENATAL CARE, SUBSEQUENT PREGNANCY, FIRST TRIMESTER: ICD-10-CM

## 2020-01-07 DIAGNOSIS — Z36.3 ENCOUNTER FOR ANTENATAL SCREENING FOR MALFORMATION USING ULTRASOUND: Primary | ICD-10-CM

## 2020-01-07 DIAGNOSIS — Z3A.21 21 WEEKS GESTATION OF PREGNANCY: ICD-10-CM

## 2020-01-07 PROCEDURE — 76805 OB US >/= 14 WKS SNGL FETUS: CPT | Performed by: OBSTETRICS & GYNECOLOGY

## 2020-01-07 PROCEDURE — 76817 TRANSVAGINAL US OBSTETRIC: CPT | Performed by: OBSTETRICS & GYNECOLOGY

## 2020-01-07 PROCEDURE — 99201 PR OFFICE OUTPATIENT NEW 10 MINUTES: CPT | Performed by: OBSTETRICS & GYNECOLOGY

## 2020-01-07 NOTE — PROGRESS NOTES
A transvaginal ultrasound was performed  Sonographer note on use of High Level Disinfection Process (Trophon) for transvaginal probe# 1 used, serial [id number]    Benito Gloria RDMS

## 2020-01-07 NOTE — PROGRESS NOTES
The patient was seen today for an ultrasound  Please see ultrasound report (located under Ob Procedures) for additional details  Thank you very much for allowing us to participate in the care of this very nice patient  Should you have any questions, please do not hesitate to contact me  Osmel Adrian MD [address]  Attending Physician, Alexandra

## 2020-01-31 ENCOUNTER — ROUTINE PRENATAL (OUTPATIENT)
Dept: OBGYN CLINIC | Facility: MEDICAL CENTER | Age: 30
End: 2020-01-31

## 2020-01-31 VITALS — SYSTOLIC BLOOD PRESSURE: 114 MMHG | DIASTOLIC BLOOD PRESSURE: 64 MMHG | BODY MASS INDEX: 26.52 KG/M2 | WEIGHT: 174.4 LBS

## 2020-01-31 DIAGNOSIS — Z34.92 PREGNANCY, OBSTETRICAL CARE, SECOND TRIMESTER: Primary | ICD-10-CM

## 2020-01-31 LAB
SL AMB  POCT GLUCOSE, UA: NORMAL
SL AMB POCT URINE PROTEIN: NORMAL

## 2020-01-31 PROCEDURE — PNV: Performed by: STUDENT IN AN ORGANIZED HEALTH CARE EDUCATION/TRAINING PROGRAM

## 2020-01-31 NOTE — PROGRESS NOTES
Encounter for supervision of low-risk pregnancy in first trimester  33 yo  at 24+5 here for routine OB visit  She is doing well  No contractions, leaking or bleeding  Good fetal movement  Anatomy earlier this month normal but limited, rescan at 32 wks  Given 28 wks slips and tdap info for next visit  We did discuss her need to give exams for work in late April early may that would require short travel distances ([employer])  Okay to accommodate however is helpful but no restrictions at this time  Return in 4 wks       Problem   Encounter for Supervision of Low-Risk Pregnancy in First Trimester    Estimated date of delivery: Estimated Date of Delivery:20 Changed by 7 week US  Flu shot 10/15/19    Prenatal Battery  A positive  Antibody negative  Rubella immune  RPR NR  HIV NR  Hep B NR  Hgb 12 8  Plt 212  GCCT neg/neg    Pap NILM   Urine Culture <19,000 MUG    Quad screen BALDO  Anatomy wnl however limited, will repeat 32 wks

## 2020-01-31 NOTE — ASSESSMENT & PLAN NOTE
35 yo  at 24+5 here for routine OB visit  She is doing well  No contractions, leaking or bleeding  Good fetal movement  Anatomy earlier this month normal but limited, rescan at 32 wks  Given 28 wks slips and tdap info for next visit  We did discuss her need to give exams for work in late April early may that would require short travel distances ([place])  Okay to accommodate however is helpful but no restrictions at this time  Return in 4 wks

## 2020-02-07 ENCOUNTER — TELEPHONE (OUTPATIENT)
Dept: PERINATAL CARE | Facility: OTHER | Age: 30
End: 2020-02-07

## 2020-02-07 NOTE — TELEPHONE ENCOUNTER
Called and spoke with patient to reschedule apt for 4/2 @ 230 due to no office hours @ [place]  Pt aware and rescheduled apt for ultrasound 4/3 @ 230 @ [place]

## 2020-02-21 ENCOUNTER — TELEPHONE (OUTPATIENT)
Dept: OBGYN CLINIC | Facility: MEDICAL CENTER | Age: 30
End: 2020-02-21

## 2020-02-21 ENCOUNTER — APPOINTMENT (OUTPATIENT)
Dept: LAB | Facility: MEDICAL CENTER | Age: 30
End: 2020-02-21
Payer: COMMERCIAL

## 2020-02-21 DIAGNOSIS — E74.39 GLUCOSE INTOLERANCE: Primary | ICD-10-CM

## 2020-02-21 DIAGNOSIS — Z34.92 PREGNANCY, OBSTETRICAL CARE, SECOND TRIMESTER: ICD-10-CM

## 2020-02-21 LAB
BASOPHILS # BLD MANUAL: 0.08 THOUSAND/UL (ref 0–0.1)
BASOPHILS NFR MAR MANUAL: 1 % (ref 0–1)
EOSINOPHIL # BLD MANUAL: 0 THOUSAND/UL (ref 0–0.4)
EOSINOPHIL NFR BLD MANUAL: 0 % (ref 0–6)
ERYTHROCYTE [DISTWIDTH] IN BLOOD BY AUTOMATED COUNT: 13.7 % (ref 11.6–15.1)
GLUCOSE 1H P 50 G GLC PO SERPL-MCNC: 161 MG/DL
HCT VFR BLD AUTO: 36.6 % (ref 34.8–46.1)
HGB BLD-MCNC: 11.7 G/DL (ref 11.5–15.4)
LYMPHOCYTES # BLD AUTO: 1.21 THOUSAND/UL (ref 0.6–4.47)
LYMPHOCYTES # BLD AUTO: 16 % (ref 14–44)
MCH RBC QN AUTO: 30.2 PG (ref 26.8–34.3)
MCHC RBC AUTO-ENTMCNC: 32 G/DL (ref 31.4–37.4)
MCV RBC AUTO: 94 FL (ref 82–98)
METAMYELOCYTES NFR BLD MANUAL: 2 % (ref 0–1)
MONOCYTES # BLD AUTO: 0.3 THOUSAND/UL (ref 0–1.22)
MONOCYTES NFR BLD: 4 % (ref 4–12)
MYELOCYTES NFR BLD MANUAL: 4 % (ref 0–1)
NEUTROPHILS # BLD MANUAL: 5.44 THOUSAND/UL (ref 1.85–7.62)
NEUTS BAND NFR BLD MANUAL: 1 % (ref 0–8)
NEUTS SEG NFR BLD AUTO: 71 % (ref 43–75)
NRBC BLD AUTO-RTO: 0 /100 WBCS
PLATELET # BLD AUTO: 142 THOUSANDS/UL (ref 149–390)
PLATELET BLD QL SMEAR: ABNORMAL
PMV BLD AUTO: 11 FL (ref 8.9–12.7)
RBC # BLD AUTO: 3.88 MILLION/UL (ref 3.81–5.12)
RBC MORPH BLD: NORMAL
RPR SER QL: NORMAL
VARIANT LYMPHS # BLD AUTO: 1 %
WBC # BLD AUTO: 7.56 THOUSAND/UL (ref 4.31–10.16)

## 2020-02-21 PROCEDURE — 82950 GLUCOSE TEST: CPT

## 2020-02-21 PROCEDURE — 85027 COMPLETE CBC AUTOMATED: CPT

## 2020-02-21 PROCEDURE — 36415 COLL VENOUS BLD VENIPUNCTURE: CPT

## 2020-02-21 PROCEDURE — 85007 BL SMEAR W/DIFF WBC COUNT: CPT

## 2020-02-21 PROCEDURE — 86592 SYPHILIS TEST NON-TREP QUAL: CPT

## 2020-02-21 NOTE — TELEPHONE ENCOUNTER
Please let patient know failed 1 hour glucola  Needs 3 hr ordered  Platelets mildly low- recommend repeat 34-36 wks in preparation for delivery

## 2020-02-21 NOTE — TELEPHONE ENCOUNTER
Pt contacted and advised as directed  Pt agreed to plan of action pt will go tomorrow to do the 3 hr glucose test  Pt was grateful for the call  Pt agreed to plan of action

## 2020-02-22 ENCOUNTER — APPOINTMENT (OUTPATIENT)
Dept: LAB | Facility: CLINIC | Age: 30
End: 2020-02-22
Payer: COMMERCIAL

## 2020-02-22 ENCOUNTER — TRANSCRIBE ORDERS (OUTPATIENT)
Dept: LAB | Facility: CLINIC | Age: 30
End: 2020-02-22

## 2020-02-22 DIAGNOSIS — E74.39 GLUCOSE INTOLERANCE: ICD-10-CM

## 2020-02-22 LAB
GLUCOSE 1H P 100 G GLC PO SERPL-MCNC: 163 MG/DL (ref 65–179)
GLUCOSE 2H P 100 G GLC PO SERPL-MCNC: 156 MG/DL (ref 65–154)
GLUCOSE 3H P 100 G GLC PO SERPL-MCNC: 96 MG/DL (ref 65–139)
GLUCOSE P FAST SERPL-MCNC: 83 MG/DL (ref 65–99)

## 2020-02-22 PROCEDURE — 36415 COLL VENOUS BLD VENIPUNCTURE: CPT

## 2020-02-22 PROCEDURE — 82952 GTT-ADDED SAMPLES: CPT

## 2020-02-22 PROCEDURE — 82951 GLUCOSE TOLERANCE TEST (GTT): CPT

## 2020-02-24 ENCOUNTER — ROUTINE PRENATAL (OUTPATIENT)
Dept: OBGYN CLINIC | Facility: CLINIC | Age: 30
End: 2020-02-24
Payer: COMMERCIAL

## 2020-02-24 ENCOUNTER — TELEPHONE (OUTPATIENT)
Dept: OBGYN CLINIC | Facility: CLINIC | Age: 30
End: 2020-02-24

## 2020-02-24 VITALS — SYSTOLIC BLOOD PRESSURE: 124 MMHG | BODY MASS INDEX: 27.83 KG/M2 | DIASTOLIC BLOOD PRESSURE: 78 MMHG | WEIGHT: 183 LBS

## 2020-02-24 DIAGNOSIS — Z34.93 ENCOUNTER FOR SUPERVISION OF LOW-RISK PREGNANCY IN THIRD TRIMESTER: Primary | ICD-10-CM

## 2020-02-24 DIAGNOSIS — Z34.93 PREGNANCY, OBSTETRICAL CARE, THIRD TRIMESTER: ICD-10-CM

## 2020-02-24 DIAGNOSIS — Z23 NEED FOR TDAP VACCINATION: ICD-10-CM

## 2020-02-24 LAB
SL AMB  POCT GLUCOSE, UA: NEGATIVE
SL AMB POCT URINE PROTEIN: NEGATIVE

## 2020-02-24 PROCEDURE — PNV: Performed by: STUDENT IN AN ORGANIZED HEALTH CARE EDUCATION/TRAINING PROGRAM

## 2020-02-24 PROCEDURE — 90715 TDAP VACCINE 7 YRS/> IM: CPT

## 2020-02-24 PROCEDURE — 90471 IMMUNIZATION ADMIN: CPT

## 2020-02-24 NOTE — PROGRESS NOTES
29yo  at 28wk0d, here for return OB visit  Feeling well overall and without concerns  Did have fall last week while on vacation in [facility]  Was seen/monitored in triage there  BPP reassuring  Good FM then and since  Denies LOF, VB, contractions  Denies dysuria, hematuria  No problems with activity  Walking sometimes, trying to be active during the day  No questions/concerns       Problem List Items Addressed This Visit        Other    Encounter for supervision of low-risk pregnancy in third trimester - Primary     -precautions reviewed  -s/p Tdap/flu  -prepregnancy BMI 24 with goal 25-35#: TWG = 23, recommended walking 5+ time per week           Relevant Orders    POCT urine dip (Completed)      Other Visit Diagnoses     Pregnancy, obstetrical care, third trimester        Need for Tdap vaccination        Relevant Orders    TDAP Vaccine greater than or equal to 8yo (Completed)

## 2020-02-24 NOTE — ASSESSMENT & PLAN NOTE
-precautions reviewed  -s/p Tdap/flu  -prepregnancy BMI 24 with goal 25-35#: TWG = 23, recommended walking 5+ time per week

## 2020-03-12 ENCOUNTER — ROUTINE PRENATAL (OUTPATIENT)
Dept: OBGYN CLINIC | Facility: MEDICAL CENTER | Age: 30
End: 2020-03-12

## 2020-03-12 ENCOUNTER — HOSPITAL ENCOUNTER (OUTPATIENT)
Facility: HOSPITAL | Age: 30
Discharge: HOME | End: 2020-03-12
Attending: STUDENT IN AN ORGANIZED HEALTH CARE EDUCATION/TRAINING PROGRAM | Admitting: STUDENT IN AN ORGANIZED HEALTH CARE EDUCATION/TRAINING PROGRAM
Payer: COMMERCIAL

## 2020-03-12 VITALS
SYSTOLIC BLOOD PRESSURE: 112 MMHG | BODY MASS INDEX: 28.19 KG/M2 | DIASTOLIC BLOOD PRESSURE: 66 MMHG | WEIGHT: 186 LBS | TEMPERATURE: 98.5 F | HEIGHT: 68 IN | HEART RATE: 87 BPM | RESPIRATION RATE: 20 BRPM

## 2020-03-12 VITALS — BODY MASS INDEX: 28.28 KG/M2 | DIASTOLIC BLOOD PRESSURE: 80 MMHG | SYSTOLIC BLOOD PRESSURE: 108 MMHG | WEIGHT: 186 LBS

## 2020-03-12 DIAGNOSIS — Z34.93 PREGNANCY, OBSTETRICAL CARE, THIRD TRIMESTER: Primary | ICD-10-CM

## 2020-03-12 LAB
SL AMB  POCT GLUCOSE, UA: NORMAL
SL AMB POCT URINE PROTEIN: NORMAL

## 2020-03-12 PROCEDURE — NC001 PR NO CHARGE: Performed by: OBSTETRICS & GYNECOLOGY

## 2020-03-12 PROCEDURE — 99213 OFFICE O/P EST LOW 20 MIN: CPT

## 2020-03-12 PROCEDURE — PNV: Performed by: STUDENT IN AN ORGANIZED HEALTH CARE EDUCATION/TRAINING PROGRAM

## 2020-03-12 NOTE — PROGRESS NOTES
L&D Triage Note - OB/GYN  Ethelda Habermann 34 y o  female MRN: [id number]  Unit/Bed#: LD TRIAGE  Encounter: [id number]      Assessment/Plan  34 y o   at 30w3d who presents for evaluation and extended monitoring after deceleration noted in the office  Feeling well, no concerns  Category I tracing so far here  Will continue to monitor, and re-evaluate after prolonged monitoring       ______________________________________________________________________      Chief Compliant: "There was some issue with the baby's heart rate"    Subjective:  34 y o  John Paul Florez at 30w3d who presents for evaluation after she was noted to have a deceleration on routine monitoring at the office  She is feeling well, denies contractions, vaginal bleeding, leakage of fluid, or decreased fetal movement       She is a [facility] patient, and has had an uncomplicated pregnancy and no other past medical history      Objective:  Vitals:    20 1612   Resp: 20   Temp: 98 5 °F (36 9 °C)   /78      FHT:  125 / Moderate 6 - 25 bpm / Accelerations present, no decelerations  [place]: none    Discussed with Dr Sherryle Powers, MD  3/12/2020 4:20 PM

## 2020-03-12 NOTE — DISCHARGE INSTRUCTIONS
Pregnancy at 27 to 30 [address]:   You may notice new symptoms such as shortness of breath, heartburn, or swelling of your ankles and feet  You may also have trouble sleeping or contractions  DISCHARGE INSTRUCTIONS:   Seek care immediately if:   · You develop a severe headache that does not go away  · You have new or increased vision changes, such as blurred or spotted vision  · You have new or increased swelling in your face or hands  · You have vaginal spotting or bleeding  · Your water broke or you feel warm water gushing or trickling from your vagina  Contact your healthcare provider if:   · You have more than 5 contractions in 1 hour  · You notice any changes in your baby's movements  · You have abdominal cramps, pressure, or tightening  · You have a change in vaginal discharge  · You have chills or a fever  · You have vaginal itching, burning, or pain  · You have yellow, green, white, or foul-smelling vaginal discharge  · You have pain or burning when you urinate, less urine than usual, or pink or bloody urine  · You have questions or concerns about your condition or care  How to care for yourself at this stage of your pregnancy:   · Eat a variety of healthy foods  Healthy foods include fruits, vegetables, whole-grain breads, low-fat dairy foods, beans, lean meats, and fish  Drink liquids as directed  Ask how much liquid to drink each day and which liquids are best for you  Limit caffeine to less than 200 milligrams each day  Limit your intake of fish to 2 servings each week  Choose fish low in mercury such as canned light tuna, shrimp, salmon, cod, or tilapia  Do not  eat fish high in mercury such as swordfish, tilefish, deyanira mackerel, and shark  · Manage heartburn  by eating 4 or 5 small meals each day instead of large meals  Avoid spicy food  · Manage swelling  by lying down and putting your feet up       · Take prenatal vitamins as directed  Your need for certain vitamins and minerals, such as folic acid, increases during pregnancy  Prenatal vitamins provide some of the extra vitamins and minerals you need  Prenatal vitamins may also help to decrease the risk of certain birth defects  · Talk to your healthcare provider about exercise  Moderate exercise can help you stay fit  Your healthcare provider will help you plan an exercise program that is safe for you during pregnancy  · Do not smoke  If you smoke, it is never too late to quit  Smoking increases your risk of a miscarriage and other health problems during your pregnancy  Smoking can cause your baby to be born too early or weigh less at birth  Ask your healthcare provider for information if you need help quitting  · Do not drink alcohol  Alcohol passes from your body to your baby through the placenta  It can affect your baby's brain development and cause fetal alcohol syndrome (FAS)  FAS is a group of conditions that causes mental, behavior, and growth problems  · Talk to your healthcare provider before you take any medicines  Many medicines may harm your baby if you take them when you are pregnant  Do not take any medicines, vitamins, herbs, or supplements without first talking to your healthcare provider  Never use illegal or street drugs (such as marijuana or cocaine) while you are pregnant  Safety tips during pregnancy:   · Avoid hot tubs and saunas  Do not use a hot tub or sauna while you are pregnant, especially during your first trimester  Hot tubs and saunas may raise your baby's temperature and increase the risk of birth defects  · Avoid toxoplasmosis  This is an infection caused by eating raw meat or being around infected cat feces  It can cause birth defects, miscarriages, and other problems  Wash your hands after you touch raw meat  Make sure any meat is well-cooked before you eat it  Avoid raw eggs and unpasteurized milk   Use gloves or ask someone else to clean your cat's litter box while you are pregnant  Changes that are happening with your baby:  By 30 weeks, your baby may weigh more than 3 pounds  Your baby may be about 11 inches long from the top of the head to the rump (baby's bottom)  Your baby's eyes open and close now  Your baby's kicks and movements are more forceful at this time  What you need to know about prenatal care: Your healthcare provider will check your blood pressure and weight  You may also need the following:  · Blood tests  may be done to check for anemia or blood type  · A urine test  may also be done to check for sugar and protein  These can be signs of gestational diabetes or infection  Protein in your urine may also be a sign of preeclampsia  Preeclampsia is a condition that can develop during week 20 or later of your pregnancy  It causes high blood pressure, and it can cause problems with your kidneys and other organs  · A Tdap vaccine and flu vaccine  may be recommended by your healthcare provider  · A gestational diabetes screen  will be done using an oral glucose tolerance test (OGTT)  An OGTT starts with a blood sugar level check after you have not eaten for 8 hours  You are then given a glucose drink  Your blood sugar level is checked after 1 hour, 2 hours, and sometimes 3 hours  Healthcare providers look at how much your blood sugar level increases from the first check  · Fundal height  is a measurement of your uterus to check your baby's growth  This number is usually the same as the number of weeks that you have been pregnant  Your healthcare provider may also check your baby's position  · Your baby's heart rate  will be checked  © 2017 [facility] Information is for End User's use only and may not be sold, redistributed or otherwise used for commercial purposes   All illustrations and images included in CareNotes® are the copyrighted property of [employer]  or [employer]  The above information is an  only  It is not intended as medical advice for individual conditions or treatments  Talk to your doctor, nurse or pharmacist before following any medical regimen to see if it is safe and effective for you

## 2020-03-12 NOTE — QUICK NOTE
Patient's FHT continued to show moderate variability with accelerations, no decelerations after extended monitoring since 1610  Discharged home

## 2020-03-12 NOTE — PROGRESS NOTES
34 y o   at 30w3d presents for routine prenatal visit  She denies contractions/leakage of fluid/vaginal bleeding  She feels good fetal movement  On doppler heart tones dropped to 90s then returned to 130s  Recommend monitoring on L&D    Problem List Items Addressed This Visit     Pregnancy, obstetrical care, third trimester    -  Primary  -3 hr glucola WNL  -f/u in 2 wks or PRN

## 2020-03-27 ENCOUNTER — ROUTINE PRENATAL (OUTPATIENT)
Dept: OBGYN CLINIC | Facility: MEDICAL CENTER | Age: 30
End: 2020-03-27

## 2020-03-27 VITALS — WEIGHT: 185.6 LBS | SYSTOLIC BLOOD PRESSURE: 118 MMHG | BODY MASS INDEX: 28.22 KG/M2 | DIASTOLIC BLOOD PRESSURE: 60 MMHG

## 2020-03-27 DIAGNOSIS — Z34.83 PRENATAL CARE, SUBSEQUENT PREGNANCY, THIRD TRIMESTER: Primary | ICD-10-CM

## 2020-03-27 LAB
SL AMB  POCT GLUCOSE, UA: NORMAL
SL AMB POCT URINE PROTEIN: NORMAL

## 2020-03-27 PROCEDURE — PNV: Performed by: OBSTETRICS & GYNECOLOGY

## 2020-03-27 NOTE — PROGRESS NOTES
Naun Most is doing well  Baby is active, reviewed kick counts  No bleeding, LOF or contractions  Works from home

## 2020-03-27 NOTE — PROGRESS NOTES
A pos, s/p flu/Tdap vaccines, Perineal massage instructions given  Feeling good, having good fetal movement

## 2020-04-02 ENCOUNTER — TELEPHONE (OUTPATIENT)
Dept: PERINATAL CARE | Facility: OTHER | Age: 30
End: 2020-04-02

## 2020-04-03 ENCOUNTER — ULTRASOUND (OUTPATIENT)
Dept: PERINATAL CARE | Facility: OTHER | Age: 30
End: 2020-04-03
Payer: COMMERCIAL

## 2020-04-03 VITALS
WEIGHT: 188 LBS | DIASTOLIC BLOOD PRESSURE: 60 MMHG | SYSTOLIC BLOOD PRESSURE: 108 MMHG | BODY MASS INDEX: 28.49 KG/M2 | HEIGHT: 68 IN

## 2020-04-03 DIAGNOSIS — Z3A.33 33 WEEKS GESTATION OF PREGNANCY: ICD-10-CM

## 2020-04-03 DIAGNOSIS — Z36.4 ULTRASOUND FOR ANTENATAL SCREENING FOR FETAL GROWTH RESTRICTION: Primary | ICD-10-CM

## 2020-04-03 DIAGNOSIS — Z36.89 ENCOUNTER FOR FETAL ANATOMIC SURVEY: ICD-10-CM

## 2020-04-03 PROCEDURE — 99212 OFFICE O/P EST SF 10 MIN: CPT | Performed by: OBSTETRICS & GYNECOLOGY

## 2020-04-03 PROCEDURE — 76816 OB US FOLLOW-UP PER FETUS: CPT | Performed by: OBSTETRICS & GYNECOLOGY

## 2020-04-10 ENCOUNTER — TELEMEDICINE (OUTPATIENT)
Dept: OBGYN CLINIC | Facility: CLINIC | Age: 30
End: 2020-04-10

## 2020-04-10 DIAGNOSIS — Z34.93 ENCOUNTER FOR SUPERVISION OF LOW-RISK PREGNANCY IN THIRD TRIMESTER: Primary | ICD-10-CM

## 2020-04-10 DIAGNOSIS — O99.810 ABNORMAL GLUCOSE AFFECTING PREGNANCY: ICD-10-CM

## 2020-04-10 PROCEDURE — PNV: Performed by: NURSE PRACTITIONER

## 2020-04-23 ENCOUNTER — ROUTINE PRENATAL (OUTPATIENT)
Dept: OBGYN CLINIC | Facility: CLINIC | Age: 30
End: 2020-04-23

## 2020-04-23 VITALS — BODY MASS INDEX: 29.16 KG/M2 | WEIGHT: 191.8 LBS | SYSTOLIC BLOOD PRESSURE: 104 MMHG | DIASTOLIC BLOOD PRESSURE: 72 MMHG

## 2020-04-23 DIAGNOSIS — Z34.93 PREGNANCY, OBSTETRICAL CARE, THIRD TRIMESTER: Primary | ICD-10-CM

## 2020-04-23 PROCEDURE — PNV: Performed by: NURSE PRACTITIONER

## 2020-04-23 PROCEDURE — 87653 STREP B DNA AMP PROBE: CPT | Performed by: NURSE PRACTITIONER

## 2020-04-25 LAB — GP B STREP DNA SPEC QL NAA+PROBE: NORMAL

## 2020-04-29 ENCOUNTER — TELEMEDICINE (OUTPATIENT)
Dept: OBGYN CLINIC | Facility: CLINIC | Age: 30
End: 2020-04-29

## 2020-04-29 VITALS
WEIGHT: 190 LBS | BODY MASS INDEX: 28.79 KG/M2 | HEIGHT: 68 IN | SYSTOLIC BLOOD PRESSURE: 114 MMHG | DIASTOLIC BLOOD PRESSURE: 80 MMHG

## 2020-04-29 DIAGNOSIS — Z34.93 ENCOUNTER FOR SUPERVISION OF LOW-RISK PREGNANCY IN THIRD TRIMESTER: Primary | ICD-10-CM

## 2020-04-29 PROCEDURE — PNV: Performed by: OBSTETRICS & GYNECOLOGY

## 2020-05-07 ENCOUNTER — TELEMEDICINE (OUTPATIENT)
Dept: OBGYN CLINIC | Facility: CLINIC | Age: 30
End: 2020-05-07

## 2020-05-07 DIAGNOSIS — Z34.93 ENCOUNTER FOR SUPERVISION OF LOW-RISK PREGNANCY IN THIRD TRIMESTER: Primary | ICD-10-CM

## 2020-05-07 PROCEDURE — PNV: Performed by: NURSE PRACTITIONER

## 2020-05-13 ENCOUNTER — HOSPITAL ENCOUNTER (OUTPATIENT)
Dept: ULTRASOUND IMAGING | Facility: HOSPITAL | Age: 30
Discharge: HOME | End: 2020-05-13
Payer: COMMERCIAL

## 2020-05-13 ENCOUNTER — TELEPHONE (OUTPATIENT)
Dept: OBGYN CLINIC | Facility: CLINIC | Age: 30
End: 2020-05-13

## 2020-05-13 ENCOUNTER — TELEPHONE (OUTPATIENT)
Dept: OTHER | Facility: OTHER | Age: 30
End: 2020-05-13

## 2020-05-13 DIAGNOSIS — M25.571 PAIN AND SWELLING OF RIGHT ANKLE: ICD-10-CM

## 2020-05-13 DIAGNOSIS — M25.471 PAIN AND SWELLING OF RIGHT ANKLE: ICD-10-CM

## 2020-05-13 DIAGNOSIS — M25.571 PAIN AND SWELLING OF RIGHT ANKLE: Primary | ICD-10-CM

## 2020-05-13 DIAGNOSIS — M25.471 PAIN AND SWELLING OF RIGHT ANKLE: Primary | ICD-10-CM

## 2020-05-13 PROCEDURE — 93970 EXTREMITY STUDY: CPT

## 2020-05-13 PROCEDURE — 93970 EXTREMITY STUDY: CPT | Performed by: SURGERY

## 2020-05-14 ENCOUNTER — TELEPHONE (OUTPATIENT)
Dept: OBGYN CLINIC | Facility: CLINIC | Age: 30
End: 2020-05-14

## 2020-05-14 ENCOUNTER — ROUTINE PRENATAL (OUTPATIENT)
Dept: OBGYN CLINIC | Age: 30
End: 2020-05-14

## 2020-05-14 VITALS
BODY MASS INDEX: 29.95 KG/M2 | DIASTOLIC BLOOD PRESSURE: 78 MMHG | SYSTOLIC BLOOD PRESSURE: 118 MMHG | WEIGHT: 197 LBS | TEMPERATURE: 98.7 F

## 2020-05-14 DIAGNOSIS — Z34.93 ENCOUNTER FOR SUPERVISION OF LOW-RISK PREGNANCY IN THIRD TRIMESTER: Primary | ICD-10-CM

## 2020-05-14 DIAGNOSIS — O12.03 SWELLING OF LOWER EXTREMITY DURING PREGNANCY IN THIRD TRIMESTER: ICD-10-CM

## 2020-05-14 LAB
SL AMB  POCT GLUCOSE, UA: NORMAL
SL AMB POCT URINE PROTEIN: NORMAL

## 2020-05-14 PROCEDURE — PNV: Performed by: STUDENT IN AN ORGANIZED HEALTH CARE EDUCATION/TRAINING PROGRAM

## 2020-05-15 ENCOUNTER — TELEPHONE (OUTPATIENT)
Dept: OBGYN CLINIC | Facility: CLINIC | Age: 30
End: 2020-05-15

## 2020-05-25 ENCOUNTER — HOSPITAL ENCOUNTER (INPATIENT)
Facility: HOSPITAL | Age: 30
LOS: 2 days | Discharge: HOME | End: 2020-05-27
Attending: STUDENT IN AN ORGANIZED HEALTH CARE EDUCATION/TRAINING PROGRAM | Admitting: OBSTETRICS & GYNECOLOGY
Payer: COMMERCIAL

## 2020-05-25 DIAGNOSIS — Z3A.41 41 WEEKS GESTATION OF PREGNANCY: ICD-10-CM

## 2020-05-25 PROBLEM — O48.0 41 WEEKS GESTATION OF PREGNANCY: Status: ACTIVE | Noted: 2020-05-25

## 2020-05-25 LAB
ABO GROUP BLD: NORMAL
BLD GP AB SCN SERPL QL: NEGATIVE
ERYTHROCYTE [DISTWIDTH] IN BLOOD BY AUTOMATED COUNT: 13.7 % (ref 11.6–15.1)
HCT VFR BLD AUTO: 34.9 % (ref 34.8–46.1)
HGB BLD-MCNC: 11.8 G/DL (ref 11.5–15.4)
MCH RBC QN AUTO: 31.6 PG (ref 26.8–34.3)
MCHC RBC AUTO-ENTMCNC: 33.8 G/DL (ref 31.4–37.4)
MCV RBC AUTO: 94 FL (ref 82–98)
PLATELET # BLD AUTO: 145 THOUSANDS/UL (ref 149–390)
PMV BLD AUTO: 11.5 FL (ref 8.9–12.7)
RBC # BLD AUTO: 3.73 MILLION/UL (ref 3.81–5.12)
RH BLD: POSITIVE
SPECIMEN EXPIRATION DATE: NORMAL
WBC # BLD AUTO: 9.38 THOUSAND/UL (ref 4.31–10.16)

## 2020-05-25 PROCEDURE — 3E0P7VZ INTRODUCTION OF HORMONE INTO FEMALE REPRODUCTIVE, VIA NATURAL OR ARTIFICIAL OPENING: ICD-10-PCS | Performed by: OBSTETRICS & GYNECOLOGY

## 2020-05-25 PROCEDURE — 86900 BLOOD TYPING SEROLOGIC ABO: CPT | Performed by: OBSTETRICS & GYNECOLOGY

## 2020-05-25 PROCEDURE — 86592 SYPHILIS TEST NON-TREP QUAL: CPT | Performed by: OBSTETRICS & GYNECOLOGY

## 2020-05-25 PROCEDURE — 85027 COMPLETE CBC AUTOMATED: CPT | Performed by: OBSTETRICS & GYNECOLOGY

## 2020-05-25 PROCEDURE — 86901 BLOOD TYPING SEROLOGIC RH(D): CPT | Performed by: OBSTETRICS & GYNECOLOGY

## 2020-05-25 PROCEDURE — 4A1HXCZ MONITORING OF PRODUCTS OF CONCEPTION, CARDIAC RATE, EXTERNAL APPROACH: ICD-10-PCS | Performed by: OBSTETRICS & GYNECOLOGY

## 2020-05-25 PROCEDURE — 99024 POSTOP FOLLOW-UP VISIT: CPT | Performed by: STUDENT IN AN ORGANIZED HEALTH CARE EDUCATION/TRAINING PROGRAM

## 2020-05-25 PROCEDURE — 86850 RBC ANTIBODY SCREEN: CPT | Performed by: OBSTETRICS & GYNECOLOGY

## 2020-05-25 RX ORDER — OXYTOCIN/RINGER'S LACTATE 30/500 ML
1-30 PLASTIC BAG, INJECTION (ML) INTRAVENOUS
Status: DISCONTINUED | OUTPATIENT
Start: 2020-05-25 | End: 2020-05-26

## 2020-05-25 RX ORDER — SODIUM CHLORIDE, SODIUM LACTATE, POTASSIUM CHLORIDE, CALCIUM CHLORIDE 600; 310; 30; 20 MG/100ML; MG/100ML; MG/100ML; MG/100ML
125 INJECTION, SOLUTION INTRAVENOUS CONTINUOUS
Status: DISCONTINUED | OUTPATIENT
Start: 2020-05-25 | End: 2020-05-26

## 2020-05-25 RX ORDER — ONDANSETRON 2 MG/ML
4 INJECTION INTRAMUSCULAR; INTRAVENOUS EVERY 6 HOURS PRN
Status: DISCONTINUED | OUTPATIENT
Start: 2020-05-25 | End: 2020-05-26

## 2020-05-25 RX ADMIN — SODIUM CHLORIDE, SODIUM LACTATE, POTASSIUM CHLORIDE, AND CALCIUM CHLORIDE 125 ML/HR: .6; .31; .03; .02 INJECTION, SOLUTION INTRAVENOUS at 18:08

## 2020-05-25 RX ADMIN — MISOPROSTOL 25 MCG: 100 TABLET ORAL at 18:55

## 2020-05-26 ENCOUNTER — ANESTHESIA (INPATIENT)
Dept: ANESTHESIOLOGY | Facility: HOSPITAL | Age: 30
End: 2020-05-26
Payer: COMMERCIAL

## 2020-05-26 ENCOUNTER — ANESTHESIA EVENT (INPATIENT)
Dept: ANESTHESIOLOGY | Facility: HOSPITAL | Age: 30
End: 2020-05-26
Payer: COMMERCIAL

## 2020-05-26 LAB
BASE EXCESS BLDCOA CALC-SCNC: -6.7 MMOL/L (ref 3–11)
BASE EXCESS BLDCOV CALC-SCNC: -5 MMOL/L (ref 1–9)
HCO3 BLDCOA-SCNC: 23 MMOL/L (ref 17.3–27.3)
HCO3 BLDCOV-SCNC: 20.7 MMOL/L (ref 12.2–28.6)
O2 CT VFR BLDCOA CALC: 9.9 ML/DL
OXYHGB MFR BLDCOA: 44.6 %
OXYHGB MFR BLDCOV: 68.2 %
PCO2 BLDCOA: 63.7 MM[HG] (ref 30–60)
PCO2 BLDCOV: 41.1 MM HG (ref 27–43)
PH BLDCOA: 7.18 [PH] (ref 7.23–7.43)
PH BLDCOV: 7.32 [PH] (ref 7.19–7.49)
PO2 BLDCOA: 24.7 MM HG (ref 5–25)
PO2 BLDCOV: 29.6 MM HG (ref 15–45)
RPR SER QL: NORMAL
SAO2 % BLDCOV: 13.6 ML/DL

## 2020-05-26 PROCEDURE — 3E033VJ INTRODUCTION OF OTHER HORMONE INTO PERIPHERAL VEIN, PERCUTANEOUS APPROACH: ICD-10-PCS | Performed by: OBSTETRICS & GYNECOLOGY

## 2020-05-26 PROCEDURE — 10907ZC DRAINAGE OF AMNIOTIC FLUID, THERAPEUTIC FROM PRODUCTS OF CONCEPTION, VIA NATURAL OR ARTIFICIAL OPENING: ICD-10-PCS | Performed by: OBSTETRICS & GYNECOLOGY

## 2020-05-26 PROCEDURE — 99024 POSTOP FOLLOW-UP VISIT: CPT | Performed by: OBSTETRICS & GYNECOLOGY

## 2020-05-26 PROCEDURE — 0KQM0ZZ REPAIR PERINEUM MUSCLE, OPEN APPROACH: ICD-10-PCS | Performed by: OBSTETRICS & GYNECOLOGY

## 2020-05-26 PROCEDURE — 82805 BLOOD GASES W/O2 SATURATION: CPT | Performed by: STUDENT IN AN ORGANIZED HEALTH CARE EDUCATION/TRAINING PROGRAM

## 2020-05-26 PROCEDURE — 59400 OBSTETRICAL CARE: CPT | Performed by: OBSTETRICS & GYNECOLOGY

## 2020-05-26 RX ORDER — BUTORPHANOL TARTRATE 1 MG/ML
1 INJECTION, SOLUTION INTRAMUSCULAR; INTRAVENOUS ONCE
Status: COMPLETED | OUTPATIENT
Start: 2020-05-26 | End: 2020-05-26

## 2020-05-26 RX ORDER — DIAPER,BRIEF,INFANT-TODD,DISP
1 EACH MISCELLANEOUS
Status: DISCONTINUED | OUTPATIENT
Start: 2020-05-26 | End: 2020-05-27 | Stop reason: HOSPADM

## 2020-05-26 RX ORDER — IBUPROFEN 600 MG/1
600 TABLET ORAL EVERY 6 HOURS PRN
Status: DISCONTINUED | OUTPATIENT
Start: 2020-05-26 | End: 2020-05-27 | Stop reason: HOSPADM

## 2020-05-26 RX ORDER — SIMETHICONE 80 MG
80 TABLET,CHEWABLE ORAL 4 TIMES DAILY PRN
Status: DISCONTINUED | OUTPATIENT
Start: 2020-05-26 | End: 2020-05-27 | Stop reason: HOSPADM

## 2020-05-26 RX ORDER — DOCUSATE SODIUM 100 MG/1
100 CAPSULE, LIQUID FILLED ORAL 2 TIMES DAILY
Status: DISCONTINUED | OUTPATIENT
Start: 2020-05-26 | End: 2020-05-27 | Stop reason: HOSPADM

## 2020-05-26 RX ORDER — SENNOSIDES 8.6 MG
1 TABLET ORAL DAILY
Status: DISCONTINUED | OUTPATIENT
Start: 2020-05-26 | End: 2020-05-27 | Stop reason: HOSPADM

## 2020-05-26 RX ORDER — ACETAMINOPHEN 325 MG/1
650 TABLET ORAL EVERY 6 HOURS PRN
Status: DISCONTINUED | OUTPATIENT
Start: 2020-05-26 | End: 2020-05-26

## 2020-05-26 RX ORDER — OXYCODONE HYDROCHLORIDE AND ACETAMINOPHEN 5; 325 MG/1; MG/1
1 TABLET ORAL EVERY 4 HOURS PRN
Status: DISCONTINUED | OUTPATIENT
Start: 2020-05-26 | End: 2020-05-27 | Stop reason: HOSPADM

## 2020-05-26 RX ORDER — CALCIUM CARBONATE 200(500)MG
1000 TABLET,CHEWABLE ORAL DAILY PRN
Status: DISCONTINUED | OUTPATIENT
Start: 2020-05-26 | End: 2020-05-27 | Stop reason: HOSPADM

## 2020-05-26 RX ORDER — ACETAMINOPHEN 325 MG/1
650 TABLET ORAL EVERY 4 HOURS PRN
Status: DISCONTINUED | OUTPATIENT
Start: 2020-05-26 | End: 2020-05-27 | Stop reason: HOSPADM

## 2020-05-26 RX ORDER — MAGNESIUM HYDROXIDE/ALUMINUM HYDROXICE/SIMETHICONE 120; 1200; 1200 MG/30ML; MG/30ML; MG/30ML
15 SUSPENSION ORAL EVERY 6 HOURS PRN
Status: DISCONTINUED | OUTPATIENT
Start: 2020-05-26 | End: 2020-05-27 | Stop reason: HOSPADM

## 2020-05-26 RX ADMIN — DOCUSATE SODIUM 100 MG: 100 CAPSULE, LIQUID FILLED ORAL at 18:12

## 2020-05-26 RX ADMIN — ROPIVACAINE HYDROCHLORIDE: 5 INJECTION, SOLUTION EPIDURAL; INFILTRATION; PERINEURAL at 04:08

## 2020-05-26 RX ADMIN — SODIUM CHLORIDE, SODIUM LACTATE, POTASSIUM CHLORIDE, AND CALCIUM CHLORIDE 125 ML/HR: .6; .31; .03; .02 INJECTION, SOLUTION INTRAVENOUS at 01:41

## 2020-05-26 RX ADMIN — HYDROCORTISONE 1 APPLICATION: 1 CREAM TOPICAL at 10:37

## 2020-05-26 RX ADMIN — WITCH HAZEL 1 PAD: 500 SOLUTION RECTAL; TOPICAL at 10:37

## 2020-05-26 RX ADMIN — BENZOCAINE AND LEVOMENTHOL: 200; 5 SPRAY TOPICAL at 10:37

## 2020-05-26 RX ADMIN — SODIUM CHLORIDE, SODIUM LACTATE, POTASSIUM CHLORIDE, AND CALCIUM CHLORIDE 125 ML/HR: .6; .31; .03; .02 INJECTION, SOLUTION INTRAVENOUS at 08:08

## 2020-05-26 RX ADMIN — Medication 2 MILLI-UNITS/MIN: at 00:14

## 2020-05-26 RX ADMIN — ACETAMINOPHEN 650 MG: 325 TABLET, FILM COATED ORAL at 07:43

## 2020-05-26 RX ADMIN — ACETAMINOPHEN 650 MG: 325 TABLET, FILM COATED ORAL at 15:26

## 2020-05-26 RX ADMIN — BUTORPHANOL TARTRATE 1 MG: 1 INJECTION, SOLUTION INTRAMUSCULAR; INTRAVENOUS at 02:32

## 2020-05-26 RX ADMIN — SODIUM CHLORIDE, SODIUM LACTATE, POTASSIUM CHLORIDE, AND CALCIUM CHLORIDE 125 ML/HR: .6; .31; .03; .02 INJECTION, SOLUTION INTRAVENOUS at 04:30

## 2020-05-27 VITALS
OXYGEN SATURATION: 98 % | RESPIRATION RATE: 18 BRPM | BODY MASS INDEX: 29.86 KG/M2 | HEART RATE: 91 BPM | WEIGHT: 197 LBS | DIASTOLIC BLOOD PRESSURE: 59 MMHG | SYSTOLIC BLOOD PRESSURE: 114 MMHG | TEMPERATURE: 98.4 F | HEIGHT: 68 IN

## 2020-05-27 PROCEDURE — 99024 POSTOP FOLLOW-UP VISIT: CPT | Performed by: OBSTETRICS & GYNECOLOGY

## 2020-05-27 RX ORDER — DOCUSATE SODIUM 100 MG/1
100 CAPSULE, LIQUID FILLED ORAL 2 TIMES DAILY
Qty: 10 CAPSULE | Refills: 0
Start: 2020-05-27 | End: 2021-09-02 | Stop reason: ALTCHOICE

## 2020-05-27 RX ORDER — ACETAMINOPHEN 325 MG/1
650 TABLET ORAL EVERY 4 HOURS PRN
Qty: 30 TABLET | Refills: 0
Start: 2020-05-27 | End: 2021-09-02 | Stop reason: ALTCHOICE

## 2020-05-27 RX ORDER — IBUPROFEN 600 MG/1
600 TABLET ORAL EVERY 6 HOURS PRN
Qty: 30 TABLET | Refills: 0
Start: 2020-05-27 | End: 2021-09-02 | Stop reason: ALTCHOICE

## 2020-05-27 RX ORDER — DIAPER,BRIEF,INFANT-TODD,DISP
1 EACH MISCELLANEOUS 4 TIMES DAILY PRN
Qty: 30 G | Refills: 0
Start: 2020-05-27 | End: 2021-09-02 | Stop reason: ALTCHOICE

## 2020-05-27 RX ADMIN — DOCUSATE SODIUM 100 MG: 100 CAPSULE, LIQUID FILLED ORAL at 08:53

## 2020-05-27 RX ADMIN — SENNOSIDES 8.6 MG: 8.6 TABLET, FILM COATED ORAL at 08:36

## 2020-05-27 RX ADMIN — ACETAMINOPHEN 650 MG: 325 TABLET, FILM COATED ORAL at 06:24

## 2020-06-02 LAB — PLACENTA IN STORAGE: NORMAL

## 2020-06-19 ENCOUNTER — POSTPARTUM VISIT (OUTPATIENT)
Dept: OBGYN CLINIC | Facility: MEDICAL CENTER | Age: 30
End: 2020-06-19

## 2020-06-19 VITALS — DIASTOLIC BLOOD PRESSURE: 72 MMHG | SYSTOLIC BLOOD PRESSURE: 100 MMHG | BODY MASS INDEX: 25.09 KG/M2 | WEIGHT: 165 LBS

## 2020-06-19 PROCEDURE — 99024 POSTOP FOLLOW-UP VISIT: CPT | Performed by: STUDENT IN AN ORGANIZED HEALTH CARE EDUCATION/TRAINING PROGRAM

## 2020-10-02 ENCOUNTER — ANNUAL EXAM (OUTPATIENT)
Dept: OBGYN CLINIC | Facility: MEDICAL CENTER | Age: 30
End: 2020-10-02
Payer: COMMERCIAL

## 2020-10-02 VITALS
BODY MASS INDEX: 25.04 KG/M2 | HEIGHT: 68 IN | WEIGHT: 165.2 LBS | DIASTOLIC BLOOD PRESSURE: 82 MMHG | SYSTOLIC BLOOD PRESSURE: 110 MMHG

## 2020-10-02 DIAGNOSIS — Z01.419 ENCOUNTER FOR ANNUAL ROUTINE GYNECOLOGICAL EXAMINATION: Primary | ICD-10-CM

## 2020-10-02 PROCEDURE — S0612 ANNUAL GYNECOLOGICAL EXAMINA: HCPCS | Performed by: STUDENT IN AN ORGANIZED HEALTH CARE EDUCATION/TRAINING PROGRAM

## 2021-02-16 ENCOUNTER — TELEPHONE (OUTPATIENT)
Dept: OBGYN CLINIC | Facility: CLINIC | Age: 31
End: 2021-02-16

## 2021-02-16 NOTE — TELEPHONE ENCOUNTER
----- Message from Romelia Spencer Kam sent at 2/16/2021 12:44 PM EST -----  Regarding: Non-Urgent Medical Question  Contact: [phone]  Hi Dr Akhil Edwards,    I have a question  I unfortunately had Covid-19 last month  Thankfully I had a mild case, and I'm now hoping to donate Convalescent Plasma  I wanted to ask if this is safe to do since I'm still breastfeeding      Thank you,    Toro Pinzon

## 2021-02-17 NOTE — TELEPHONE ENCOUNTER
Please let her know this is fine - she can touch base with [facility] for eligibility

## 2021-04-09 DIAGNOSIS — Z23 ENCOUNTER FOR IMMUNIZATION: ICD-10-CM

## 2021-09-02 ENCOUNTER — TELEPHONE (OUTPATIENT)
Dept: OBGYN CLINIC | Facility: CLINIC | Age: 31
End: 2021-09-02

## 2021-09-02 DIAGNOSIS — O09.299 HX OF SPONTANEOUS ABORTION, CURRENTLY PREGNANT: Primary | ICD-10-CM

## 2021-09-02 RX ORDER — PROGESTERONE 100 MG/1
1 CAPSULE ORAL 2 TIMES DAILY
Qty: 60 CAPSULE | Refills: 2 | Status: SHIPPED | OUTPATIENT
Start: 2021-09-02 | End: 2021-11-08

## 2021-09-02 NOTE — TELEPHONE ENCOUNTER
----- Message from Romelia Cardoza Tj Kam sent at 9/2/2021  9:54 AM EDT -----  Regarding: Non-Urgent Medical Question  Contact: [phone]  Hello,    I have an appointment for 9/20 for an early ultrasound, but I had a question that I wanted to ask before then  When I was pregnant with my son, I was prescribed progesterone and told to take low-dose aspirin due to a previous miscarriage  I was wondering if that would be necessary for this pregnancy as well      Thank you,    Grant Pereira

## 2021-09-02 NOTE — TELEPHONE ENCOUNTER
I called pt and reviewed her concerns and explained that I can rx prometrium 100mg  BID for her to start from now, this is now a proven preventative treatment, but worth trying  She was happy with this  I stated that MFM would start her on ASA after 12 weeks  So will wait until US appt  If there are other treatments used with ASA for SAB hx, I am not aware and do not feel comfortable prescribing for her  She verbalized understanding  She also adds that she believes that for upcomign ultrasound she maybe actually be 7 weeks when she presents for the [address]  Told her not a problem

## 2021-09-20 ENCOUNTER — ULTRASOUND (OUTPATIENT)
Dept: OBGYN CLINIC | Age: 31
End: 2021-09-20
Payer: COMMERCIAL

## 2021-09-20 VITALS
SYSTOLIC BLOOD PRESSURE: 120 MMHG | BODY MASS INDEX: 24.98 KG/M2 | WEIGHT: 164.8 LBS | DIASTOLIC BLOOD PRESSURE: 76 MMHG | HEIGHT: 68 IN

## 2021-09-20 DIAGNOSIS — N91.2 AMENORRHEA: Primary | ICD-10-CM

## 2021-09-20 PROCEDURE — 76817 TRANSVAGINAL US OBSTETRIC: CPT | Performed by: NURSE PRACTITIONER

## 2021-09-20 NOTE — PATIENT INSTRUCTIONS
First Trimester Pregnancy   WHAT YOU NEED TO KNOW:   The first trimester of pregnancy lasts from your last period through the 12th week of pregnancy  Follow up with your healthcare providers for prenatal care  Regular prenatal care can help to keep you and your baby healthy  DISCHARGE INSTRUCTIONS:   Return to the emergency department if:   · You have pain or cramping in your abdomen or low back  · You have severe vaginal bleeding or clotting  Call your doctor or obstetrician if:   · You have light bleeding  · You have chills or a fever  · You have vaginal itching, burning, or pain  · You have yellow, green, white, or foul-smelling vaginal discharge  · You have pain or burning when you urinate, less urine than usual, or pink or bloody urine  · You have questions or concerns about your condition or care  Follow up with your doctor or obstetrician as directed:  Go to all of your prenatal visits during your pregnancy  Write down your questions so you remember to ask them during your visits  Prenatal care:  Prenatal care is a series of visits with your healthcare provider throughout your pregnancy  Prenatal care can help prevent problems during pregnancy and childbirth  At each prenatal visit, your healthcare provider will weigh you and check your blood pressure  Your healthcare provider will also check your baby's heartbeat and growth  You may also need the following at some visits:  · A pelvic exam  allows your healthcare provider to see your cervix (the bottom part of your uterus)  Your healthcare provider will use a speculum to open your vagina  He or she will check the size and shape of your uterus  · Blood tests  may be done to check for any of the following:     ? Gestational diabetes or anemia (low iron level)    ? Blood type or Rh factor, or certain birth defects    ? Immunity to certain diseases, such as chickenpox or rubella    ?  An infection, such as a sexually transmitted infection, HIV, or hepatitis B    · Hepatitis B  may need to be prevented or treated  Hepatitis B is inflammation of the liver caused by the hepatitis B virus (HBV)  HBV can spread from a mother to her baby during delivery  You will be checked for HBV as early as possible in the first trimester of each pregnancy  You need the test even if you received the hepatitis B vaccine or were tested before  You may need to have an HBV infection treated before you give birth  · Urine tests  may also be done to check for sugar and protein  These can be signs of gestational diabetes or preeclampsia  Urine tests may also be done to check for signs of infection  · A fetal ultrasound  shows pictures of your baby inside your uterus  The pictures are used to check your baby's development, movement, and position  Your healthcare provider may be able to tell you if you are having a boy or a girl during the ultrasound  This is usually possible at around 18 to 22 weeks  · Genetic disorder screening tests  may be offered to you  These screening tests check your baby's risk for genetic disorders such as Down syndrome  A screening test includes a blood test and ultrasound  Stay healthy during pregnancy:   · Take prenatal vitamins as directed  Prenatal vitamins can help you get the amount of vitamins and minerals you need during pregnancy  Prenatal vitamins may also decrease the risk of certain birth defects  · Eat a variety of healthy foods  Healthy foods include fruits, vegetables, whole-grain breads, low-fat dairy products, beans, turkey and chicken, and lean red meat  Ask your healthcare provider for more information about foods that are healthy and safe to eat during pregnancy  · Drink liquids as directed  Ask how much liquid to drink each day and which liquids are best for you  Some healthy liquids include milk, water, and juice  It is not clear how caffeine affects pregnancy   Limit your intake of caffeine to less than 200 mg each day to avoid possible health problems  Caffeine may be found in coffee, tea, cola, sports drinks, and chocolate  Do not drink alcohol  · Talk to your healthcare provider before you take medicines  Many medicines can harm your baby, especially during early pregnancy  Ask your healthcare provider before you take any medicines, including over-the-counter medicines, vitamins, herbs, or food supplements  Never use illegal or street drugs while you are pregnant  Talk to your healthcare provider if you are having trouble quitting street drugs  · Exercise as directed  Ask your healthcare provider about the best exercise plan for you  Exercise may help you feel better and make your labor and delivery easier  · Do not smoke  Smoking increases your risk of a miscarriage and other health problems during your pregnancy  Smoking can cause your baby to be born too early or weigh less at birth  Quit smoking as soon as you think you might be pregnant  Ask your healthcare provider for information if you need help quitting  Safety tips:   · Do not use a hot tub or sauna  while you are pregnant, especially during your first trimester  Hot tubs and saunas may raise your baby's temperature and increase the risk of birth defects  It also increases your risk of miscarriage  · Protect yourself from illness  Toxoplasmosis is a disease that can cause birth defects  To protect yourself from this disease, do not clean your cat's litter box yourself  Ask someone else to clean your cat's litter box while you are pregnant  Also, do not  eat raw meat or unwashed fruits and vegetables  Wash your hands after you touch raw meat, and eat only well-cooked meat  Wash fruits and vegetables well before you eat them  © Copyright [address] Information is for End User's use only and may not be sold, redistributed or otherwise used for commercial purposes   All illustrations and images included in CareNotes® are the copyrighted property of [employer]  or Jaimie Funez   The above information is an  only  It is not intended as medical advice for individual conditions or treatments  Talk to your doctor, nurse or pharmacist before following any medical regimen to see if it is safe and effective for you

## 2021-09-20 NOTE — PROGRESS NOTES
Technician: Study performed by the interpreting Certified Nurse Practitioner    Indications:  amenorrhea       Prior to use, disinfection was performed with [employer]  Probe Serial Number#  [id number])                                              LMP 21   (9 weeks 6 days gestational age based on dates)      , irregular periods      Procedure Details  A gestational sac is seen containing a yolk sac and a jett embryo  The embryonic crown-rump length measures 1 1 3 cm  and calculates to an estimated gestational age of 9 weeks and 2 Days  Embryonic cardiac activity is present @ 150's    Findings:   Viable, jett intrauterine pregnancy at 7 weeks,  2days, (++ change to ANIA)  with a calculated ANIA of 22 as per today's ultrasound    Plan to RTO for OB Intake  First Trimester Screening reviewed- declined for now

## 2021-10-08 ENCOUNTER — TELEMEDICINE (OUTPATIENT)
Dept: OBGYN CLINIC | Facility: CLINIC | Age: 31
End: 2021-10-08

## 2021-10-08 DIAGNOSIS — Z34.81 PRENATAL CARE, SUBSEQUENT PREGNANCY, FIRST TRIMESTER: Primary | ICD-10-CM

## 2021-10-08 PROCEDURE — OBC: Performed by: NURSE PRACTITIONER

## 2021-10-21 ENCOUNTER — APPOINTMENT (OUTPATIENT)
Dept: LAB | Facility: MEDICAL CENTER | Age: 31
End: 2021-10-21
Payer: COMMERCIAL

## 2021-10-21 DIAGNOSIS — Z34.81 PRENATAL CARE, SUBSEQUENT PREGNANCY, FIRST TRIMESTER: ICD-10-CM

## 2021-10-21 LAB
ABO GROUP BLD: NORMAL
BACTERIA UR QL AUTO: NORMAL /HPF
BASOPHILS # BLD AUTO: 0.02 THOUSANDS/ΜL (ref 0–0.1)
BASOPHILS NFR BLD AUTO: 0 % (ref 0–1)
BILIRUB UR QL STRIP: NEGATIVE
BLD GP AB SCN SERPL QL: NEGATIVE
CLARITY UR: NORMAL
COLOR UR: YELLOW
EOSINOPHIL # BLD AUTO: 0.05 THOUSAND/ΜL (ref 0–0.61)
EOSINOPHIL NFR BLD AUTO: 1 % (ref 0–6)
ERYTHROCYTE [DISTWIDTH] IN BLOOD BY AUTOMATED COUNT: 12.8 % (ref 11.6–15.1)
GLUCOSE UR STRIP-MCNC: NEGATIVE MG/DL
HBV SURFACE AG SER QL: NORMAL
HCT VFR BLD AUTO: 42.1 % (ref 34.8–46.1)
HCV AB SER QL: NORMAL
HGB BLD-MCNC: 14.5 G/DL (ref 11.5–15.4)
HGB UR QL STRIP.AUTO: NEGATIVE
IMM GRANULOCYTES # BLD AUTO: 0.03 THOUSAND/UL (ref 0–0.2)
IMM GRANULOCYTES NFR BLD AUTO: 0 % (ref 0–2)
KETONES UR STRIP-MCNC: NEGATIVE MG/DL
LEUKOCYTE ESTERASE UR QL STRIP: NEGATIVE
LYMPHOCYTES # BLD AUTO: 1.7 THOUSANDS/ΜL (ref 0.6–4.47)
LYMPHOCYTES NFR BLD AUTO: 24 % (ref 14–44)
MCH RBC QN AUTO: 30.3 PG (ref 26.8–34.3)
MCHC RBC AUTO-ENTMCNC: 34.4 G/DL (ref 31.4–37.4)
MCV RBC AUTO: 88 FL (ref 82–98)
MONOCYTES # BLD AUTO: 0.39 THOUSAND/ΜL (ref 0.17–1.22)
MONOCYTES NFR BLD AUTO: 6 % (ref 4–12)
NEUTROPHILS # BLD AUTO: 4.81 THOUSANDS/ΜL (ref 1.85–7.62)
NEUTS SEG NFR BLD AUTO: 69 % (ref 43–75)
NITRITE UR QL STRIP: NEGATIVE
NON-SQ EPI CELLS URNS QL MICRO: NORMAL /HPF
NRBC BLD AUTO-RTO: 0 /100 WBCS
PH UR STRIP.AUTO: 6.5 [PH]
PLATELET # BLD AUTO: 214 THOUSANDS/UL (ref 149–390)
PMV BLD AUTO: 10.3 FL (ref 8.9–12.7)
PROT UR STRIP-MCNC: NEGATIVE MG/DL
RBC # BLD AUTO: 4.79 MILLION/UL (ref 3.81–5.12)
RBC #/AREA URNS AUTO: NORMAL /HPF
RH BLD: POSITIVE
RUBV IGG SERPL IA-ACNC: >175 IU/ML
SP GR UR STRIP.AUTO: 1.01 (ref 1–1.03)
UROBILINOGEN UR QL STRIP.AUTO: 1 E.U./DL
WBC # BLD AUTO: 7 THOUSAND/UL (ref 4.31–10.16)
WBC #/AREA URNS AUTO: NORMAL /HPF

## 2021-10-21 PROCEDURE — 80081 OBSTETRIC PANEL INC HIV TSTG: CPT

## 2021-10-21 PROCEDURE — 87086 URINE CULTURE/COLONY COUNT: CPT

## 2021-10-21 PROCEDURE — 36415 COLL VENOUS BLD VENIPUNCTURE: CPT

## 2021-10-21 PROCEDURE — 81001 URINALYSIS AUTO W/SCOPE: CPT

## 2021-10-21 PROCEDURE — 86803 HEPATITIS C AB TEST: CPT

## 2021-10-22 LAB
BACTERIA UR CULT: NORMAL
RPR SER QL: NORMAL

## 2021-10-23 LAB — HIV 1+2 AB+HIV1 P24 AG SERPL QL IA: NORMAL

## 2021-10-25 ENCOUNTER — INITIAL PRENATAL (OUTPATIENT)
Dept: OBGYN CLINIC | Facility: MEDICAL CENTER | Age: 31
End: 2021-10-25
Payer: COMMERCIAL

## 2021-10-25 VITALS — DIASTOLIC BLOOD PRESSURE: 78 MMHG | BODY MASS INDEX: 25.57 KG/M2 | WEIGHT: 168.2 LBS | SYSTOLIC BLOOD PRESSURE: 112 MMHG

## 2021-10-25 DIAGNOSIS — Z34.81 ENCOUNTER FOR SUPERVISION OF OTHER NORMAL PREGNANCY IN FIRST TRIMESTER: ICD-10-CM

## 2021-10-25 DIAGNOSIS — Z11.3 SCREENING FOR STDS (SEXUALLY TRANSMITTED DISEASES): ICD-10-CM

## 2021-10-25 DIAGNOSIS — Z23 NEED FOR INFLUENZA VACCINATION: Primary | ICD-10-CM

## 2021-10-25 PROBLEM — Z34.93 ENCOUNTER FOR SUPERVISION OF LOW-RISK PREGNANCY IN THIRD TRIMESTER: Status: RESOLVED | Noted: 2019-11-04 | Resolved: 2021-10-25

## 2021-10-25 PROBLEM — Z3A.41 41 WEEKS GESTATION OF PREGNANCY: Status: RESOLVED | Noted: 2020-05-25 | Resolved: 2021-10-25

## 2021-10-25 PROBLEM — O12.03 SWELLING OF LOWER EXTREMITY DURING PREGNANCY IN THIRD TRIMESTER: Status: RESOLVED | Noted: 2020-05-14 | Resolved: 2021-10-25

## 2021-10-25 PROBLEM — O48.0 41 WEEKS GESTATION OF PREGNANCY: Status: RESOLVED | Noted: 2020-05-25 | Resolved: 2021-10-25

## 2021-10-25 PROBLEM — Z34.91 ENCOUNTER FOR SUPERVISION OF NORMAL PREGNANCY IN FIRST TRIMESTER: Status: ACTIVE | Noted: 2021-10-25

## 2021-10-25 LAB
SL AMB  POCT GLUCOSE, UA: NORMAL
SL AMB POCT URINE PROTEIN: NORMAL

## 2021-10-25 PROCEDURE — 87491 CHLMYD TRACH DNA AMP PROBE: CPT | Performed by: NURSE PRACTITIONER

## 2021-10-25 PROCEDURE — 90471 IMMUNIZATION ADMIN: CPT | Performed by: NURSE PRACTITIONER

## 2021-10-25 PROCEDURE — 87591 N.GONORRHOEAE DNA AMP PROB: CPT | Performed by: NURSE PRACTITIONER

## 2021-10-25 PROCEDURE — 90686 IIV4 VACC NO PRSV 0.5 ML IM: CPT | Performed by: NURSE PRACTITIONER

## 2021-10-25 PROCEDURE — PNV: Performed by: NURSE PRACTITIONER

## 2021-10-26 LAB
C TRACH DNA SPEC QL NAA+PROBE: NEGATIVE
N GONORRHOEA DNA SPEC QL NAA+PROBE: NEGATIVE

## 2021-10-27 ENCOUNTER — TELEPHONE (OUTPATIENT)
Dept: PERINATAL CARE | Facility: OTHER | Age: 31
End: 2021-10-27

## 2021-10-27 ENCOUNTER — TELEPHONE (OUTPATIENT)
Dept: OBGYN CLINIC | Facility: CLINIC | Age: 31
End: 2021-10-27

## 2021-11-05 ENCOUNTER — ROUTINE PRENATAL (OUTPATIENT)
Dept: PERINATAL CARE | Facility: OTHER | Age: 31
End: 2021-11-05
Payer: COMMERCIAL

## 2021-11-05 VITALS
BODY MASS INDEX: 25.61 KG/M2 | WEIGHT: 169 LBS | DIASTOLIC BLOOD PRESSURE: 87 MMHG | HEART RATE: 105 BPM | SYSTOLIC BLOOD PRESSURE: 126 MMHG | HEIGHT: 68 IN

## 2021-11-05 DIAGNOSIS — Z36.82 ENCOUNTER FOR ANTENATAL SCREENING FOR NUCHAL TRANSLUCENCY: Primary | ICD-10-CM

## 2021-11-05 DIAGNOSIS — Z3A.13 13 WEEKS GESTATION OF PREGNANCY: ICD-10-CM

## 2021-11-05 PROCEDURE — 76813 OB US NUCHAL MEAS 1 GEST: CPT | Performed by: OBSTETRICS & GYNECOLOGY

## 2021-11-05 PROCEDURE — 76801 OB US < 14 WKS SINGLE FETUS: CPT | Performed by: OBSTETRICS & GYNECOLOGY

## 2021-11-05 PROCEDURE — 99212 OFFICE O/P EST SF 10 MIN: CPT | Performed by: OBSTETRICS & GYNECOLOGY

## 2021-11-10 ENCOUNTER — TELEPHONE (OUTPATIENT)
Dept: PERINATAL CARE | Facility: CLINIC | Age: 31
End: 2021-11-10

## 2021-11-10 LAB
# FETUSES US: 1
AGE: NORMAL
B-HCG ADJ MOM SERPL: 0.45
B-HCG SERPL-ACNC: 30.5 IU/ML
COLLECT DATE: NORMAL
CURRENT SMOKER: NO
DONATED EGG PATIENT QL: NO
FET CRL US.MEAS: 81 MM
FET CRL US.MEAS: NORMAL MM
FET NUCHAL FOLD MOM THICKNESS US.MEAS: 1.02
FET NUCHAL FOLD THICKNESS US.MEAS: 1.9 MM
FET NUCHAL FOLD THICKNESS US.MEAS: NORMAL MM
FET TS 21 RISK FROM MAT AGE: NORMAL
GA CLIN EST: 13.7 WK
GA METHOD: NORMAL
HX OF NTD NARR: NO
HX OF TRISOMY 21 NARR: NO
IDDM PATIENT QL: NO
INTEGRATED SCN PATIENT-IMP: NORMAL
IVF PREGNANCY: NORMAL
PAPP-A MOM SERPL: 0.95
PAPP-A SERPL-MCNC: 1281.2 NG/ML
PHYSICIAN NPI: NORMAL
SL AMB NASAL BONE: PRESENT
SL AMB NTQR LOCATION ID#: NORMAL
SL AMB NTQR READING PHYS ID#: NORMAL
SL AMB REFERRING PHYSICIAN NAME: NORMAL
SL AMB REFERRING PHYSICIAN PHONE: NORMAL
SL AMB REPEAT SPECIMEN: NO
SL AMB TWIN B NASAL BONE: NORMAL
SONOGRAPHER NAME: NORMAL
SONOGRAPHER: NORMAL
SONOGRAPHER: NORMAL
TS 18 RISK FETUS: NORMAL
TS 21 RISK FETUS: NORMAL
US DATE: NORMAL
US FETUSES STUDY [IMP]: NORMAL

## 2021-11-22 ENCOUNTER — ROUTINE PRENATAL (OUTPATIENT)
Dept: OBGYN CLINIC | Facility: MEDICAL CENTER | Age: 31
End: 2021-11-22

## 2021-11-22 VITALS — SYSTOLIC BLOOD PRESSURE: 116 MMHG | BODY MASS INDEX: 26 KG/M2 | WEIGHT: 171 LBS | DIASTOLIC BLOOD PRESSURE: 82 MMHG

## 2021-11-22 DIAGNOSIS — O09.299 HISTORY OF MACROSOMIA IN INFANT IN PRIOR PREGNANCY, CURRENTLY PREGNANT: ICD-10-CM

## 2021-11-22 DIAGNOSIS — Z34.82 ENCOUNTER FOR SUPERVISION OF OTHER NORMAL PREGNANCY IN SECOND TRIMESTER: Primary | ICD-10-CM

## 2021-11-22 PROBLEM — Z34.92 ENCOUNTER FOR SUPERVISION OF NORMAL PREGNANCY IN SECOND TRIMESTER: Status: ACTIVE | Noted: 2021-10-25

## 2021-11-22 LAB
SL AMB  POCT GLUCOSE, UA: ABNORMAL
SL AMB POCT URINE PROTEIN: ABNORMAL

## 2021-11-22 PROCEDURE — PNV: Performed by: NURSE PRACTITIONER

## 2021-11-30 LAB
# FETUSES US: 1
AFP ADJ MOM SERPL: 1.16
AFP SERPL-MCNC: 40.4 NG/ML
B-HCG ADJ MOM SERPL: 0.44
B-HCG SERPL-ACNC: 12.7 IU/ML
COLLECT DATE: NORMAL
CURRENT SMOKER: NO
FET CRL US.MEAS: 81 MM
FET NUCHAL FOLD MOM THICKNESS US.MEAS: 1.02
FET NUCHAL FOLD THICKNESS US.MEAS: 1.9 MM
FET TS 21 RISK FROM MAT AGE: NORMAL
GA CLIN EST: 16.7 WK
HX OF NTD NARR: NORMAL
IDDM PATIENT QL: NORMAL
INHIBIN A ADJ MOM SERPL: 0.42
INHIBIN A SERPL-MCNC: 64 PG/ML
INTEGRATED SCN PATIENT-IMP: NORMAL
IVF PREGNANCY: NORMAL
NEURAL TUBE DEFECT RISK FETUS: NORMAL %
PAPP-A MOM SERPL: 0.95
PAPP-A SERPL-MCNC: 1281.2 NG/ML
PHYSICIAN NPI: NORMAL
SL AMB NASAL BONE: PRESENT
SL AMB REFERRING PHYSICIAN NAME: NORMAL
SL AMB REFERRING PHYSICIAN PHONE: NORMAL
SL AMB REPEAT SPECIMEN: NORMAL
SL AMB SPECIMEN # FROM PART 1: NORMAL
SL AMB TWIN B NASAL BONE: NORMAL
TS 18 RISK FETUS: NORMAL
TS 21 RISK FETUS: NORMAL
U ESTRIOL ADJ MOM SERPL: 1.05
U ESTRIOL SERPL-MCNC: 0.88 NG/ML
US DATE: NORMAL

## 2021-12-03 ENCOUNTER — APPOINTMENT (OUTPATIENT)
Dept: LAB | Facility: MEDICAL CENTER | Age: 31
End: 2021-12-03
Payer: COMMERCIAL

## 2021-12-03 DIAGNOSIS — O09.299 HISTORY OF MACROSOMIA IN INFANT IN PRIOR PREGNANCY, CURRENTLY PREGNANT: ICD-10-CM

## 2021-12-03 LAB — GLUCOSE 1H P 50 G GLC PO SERPL-MCNC: 165 MG/DL (ref 40–134)

## 2021-12-03 PROCEDURE — 36415 COLL VENOUS BLD VENIPUNCTURE: CPT

## 2021-12-03 PROCEDURE — 82950 GLUCOSE TEST: CPT

## 2021-12-05 ENCOUNTER — TELEPHONE (OUTPATIENT)
Dept: OBGYN CLINIC | Facility: MEDICAL CENTER | Age: 31
End: 2021-12-05

## 2021-12-05 DIAGNOSIS — R73.09 GLUCOSE TOLERANCE TEST ABNORMAL: Primary | ICD-10-CM

## 2021-12-05 PROBLEM — O99.810 ABNORMAL GLUCOSE AFFECTING PREGNANCY: Status: ACTIVE | Noted: 2021-12-05

## 2021-12-06 ENCOUNTER — TELEPHONE (OUTPATIENT)
Dept: OBGYN CLINIC | Facility: CLINIC | Age: 31
End: 2021-12-06

## 2021-12-07 ENCOUNTER — APPOINTMENT (OUTPATIENT)
Dept: LAB | Facility: CLINIC | Age: 31
End: 2021-12-07
Payer: COMMERCIAL

## 2021-12-07 ENCOUNTER — TELEPHONE (OUTPATIENT)
Dept: OBGYN CLINIC | Facility: MEDICAL CENTER | Age: 31
End: 2021-12-07

## 2021-12-07 DIAGNOSIS — R73.09 GLUCOSE TOLERANCE TEST ABNORMAL: ICD-10-CM

## 2021-12-07 LAB
GLUCOSE 1H P 100 G GLC PO SERPL-MCNC: 169 MG/DL (ref 65–179)
GLUCOSE 2H P 100 G GLC PO SERPL-MCNC: 155 MG/DL (ref 65–154)
GLUCOSE 3H P 100 G GLC PO SERPL-MCNC: 58 MG/DL (ref 65–139)
GLUCOSE P FAST SERPL-MCNC: 87 MG/DL (ref 65–99)

## 2021-12-07 PROCEDURE — 82951 GLUCOSE TOLERANCE TEST (GTT): CPT

## 2021-12-07 PROCEDURE — 36415 COLL VENOUS BLD VENIPUNCTURE: CPT

## 2021-12-07 PROCEDURE — 82952 GTT-ADDED SAMPLES: CPT

## 2021-12-20 ENCOUNTER — ROUTINE PRENATAL (OUTPATIENT)
Dept: OBGYN CLINIC | Facility: MEDICAL CENTER | Age: 31
End: 2021-12-20

## 2021-12-20 VITALS
SYSTOLIC BLOOD PRESSURE: 124 MMHG | WEIGHT: 174.4 LBS | DIASTOLIC BLOOD PRESSURE: 72 MMHG | HEIGHT: 68 IN | BODY MASS INDEX: 26.43 KG/M2

## 2021-12-20 DIAGNOSIS — Z34.82 ENCOUNTER FOR SUPERVISION OF OTHER NORMAL PREGNANCY IN SECOND TRIMESTER: Primary | ICD-10-CM

## 2021-12-20 DIAGNOSIS — O99.810 ABNORMAL GLUCOSE AFFECTING PREGNANCY: ICD-10-CM

## 2021-12-20 LAB
SL AMB  POCT GLUCOSE, UA: NEGATIVE
SL AMB POCT URINE PROTEIN: NEGATIVE

## 2021-12-20 PROCEDURE — PNV: Performed by: STUDENT IN AN ORGANIZED HEALTH CARE EDUCATION/TRAINING PROGRAM

## 2021-12-21 ENCOUNTER — ROUTINE PRENATAL (OUTPATIENT)
Dept: PERINATAL CARE | Facility: OTHER | Age: 31
End: 2021-12-21
Payer: COMMERCIAL

## 2021-12-21 VITALS
HEIGHT: 68 IN | WEIGHT: 174.4 LBS | BODY MASS INDEX: 26.43 KG/M2 | DIASTOLIC BLOOD PRESSURE: 82 MMHG | HEART RATE: 100 BPM | SYSTOLIC BLOOD PRESSURE: 135 MMHG

## 2021-12-21 DIAGNOSIS — Z36.3 ENCOUNTER FOR ANTENATAL SCREENING FOR MALFORMATION USING ULTRASOUND: Primary | ICD-10-CM

## 2021-12-21 DIAGNOSIS — Z3A.20 20 WEEKS GESTATION OF PREGNANCY: ICD-10-CM

## 2021-12-21 DIAGNOSIS — Z36.86 ENCOUNTER FOR ANTENATAL SCREENING FOR CERVICAL LENGTH: ICD-10-CM

## 2021-12-21 PROCEDURE — 76805 OB US >/= 14 WKS SNGL FETUS: CPT | Performed by: OBSTETRICS & GYNECOLOGY

## 2021-12-21 PROCEDURE — 99212 OFFICE O/P EST SF 10 MIN: CPT | Performed by: OBSTETRICS & GYNECOLOGY

## 2021-12-21 PROCEDURE — 76817 TRANSVAGINAL US OBSTETRIC: CPT | Performed by: OBSTETRICS & GYNECOLOGY

## 2022-01-20 ENCOUNTER — ROUTINE PRENATAL (OUTPATIENT)
Dept: OBGYN CLINIC | Facility: MEDICAL CENTER | Age: 32
End: 2022-01-20

## 2022-01-20 VITALS
SYSTOLIC BLOOD PRESSURE: 112 MMHG | BODY MASS INDEX: 27.01 KG/M2 | DIASTOLIC BLOOD PRESSURE: 74 MMHG | WEIGHT: 178.2 LBS | HEIGHT: 68 IN

## 2022-01-20 DIAGNOSIS — Z34.82 ENCOUNTER FOR SUPERVISION OF OTHER NORMAL PREGNANCY IN SECOND TRIMESTER: ICD-10-CM

## 2022-01-20 DIAGNOSIS — Z11.3 SCREENING FOR STDS (SEXUALLY TRANSMITTED DISEASES): Primary | ICD-10-CM

## 2022-01-20 DIAGNOSIS — O09.299 HISTORY OF MACROSOMIA IN INFANT IN PRIOR PREGNANCY, CURRENTLY PREGNANT: ICD-10-CM

## 2022-01-20 DIAGNOSIS — Z3A.24 24 WEEKS GESTATION OF PREGNANCY: ICD-10-CM

## 2022-01-20 DIAGNOSIS — O99.810 ABNORMAL GLUCOSE AFFECTING PREGNANCY: ICD-10-CM

## 2022-01-20 PROBLEM — N91.2 AMENORRHEA: Status: RESOLVED | Noted: 2021-09-20 | Resolved: 2022-01-20

## 2022-01-20 LAB
SL AMB  POCT GLUCOSE, UA: NEGATIVE
SL AMB POCT URINE PROTEIN: NEGATIVE

## 2022-01-20 PROCEDURE — PNV: Performed by: PHYSICIAN ASSISTANT

## 2022-01-20 NOTE — PROGRESS NOTES
Abnormal glucose affecting pregnancy  Repeat 3 hr ordered with 28 week labs  24 weeks gestation of pregnancy  Routine PNV  Encounter for supervision of normal pregnancy in second trimester  Marisabel Cintron  is a 36 y o  [id number] @24w5d who presents for routine prenatal visit  Denies OB complaints  Denies contractions, cramping, leakage of fluid or vaginal bleeding  Good fetal movement  Level II US 12/21/21: size = dates, no anomalies  Normal JANAK  No F/u recommended  Given 28 week labs  Tdap at next visit  Had Flu vaccine  Plans to breastfeed  Reviewed reasons to call  History of macrosomia in infant in prior pregnancy, currently pregnant  Needs 3rd trimester growth scan

## 2022-01-20 NOTE — PROGRESS NOTES
PNV 24w5d    Patient denies any lof, vb or ctx  Patient has +fm  Patient urine is negative  Patient has no concerns today

## 2022-01-20 NOTE — ASSESSMENT & PLAN NOTE
Constance Patient  is a 36 y o   @24w5d who presents for routine prenatal visit  Denies OB complaints  Denies contractions, cramping, leakage of fluid or vaginal bleeding  Good fetal movement  Level II US 21: size = dates, no anomalies  Normal JANAK  No F/u recommended  Given 28 week labs  Tdap at next visit  Had Flu vaccine  Plans to breastfeed  Reviewed reasons to call

## 2022-02-04 ENCOUNTER — TELEPHONE (OUTPATIENT)
Dept: OBGYN CLINIC | Facility: CLINIC | Age: 32
End: 2022-02-04

## 2022-02-04 NOTE — TELEPHONE ENCOUNTER
Regarding: Note for Work  Hello,    The dates for the in-person events are 4/25-5/6        Thank you,    Nelida Solomon

## 2022-02-10 ENCOUNTER — APPOINTMENT (OUTPATIENT)
Dept: LAB | Facility: CLINIC | Age: 32
End: 2022-02-10
Payer: COMMERCIAL

## 2022-02-10 DIAGNOSIS — Z34.82 ENCOUNTER FOR SUPERVISION OF OTHER NORMAL PREGNANCY IN SECOND TRIMESTER: ICD-10-CM

## 2022-02-10 LAB
GLUCOSE 1H P 100 G GLC PO SERPL-MCNC: 167 MG/DL (ref 65–179)
GLUCOSE 2H P 100 G GLC PO SERPL-MCNC: 179 MG/DL (ref 65–154)
GLUCOSE 3H P 100 G GLC PO SERPL-MCNC: 111 MG/DL (ref 65–139)
GLUCOSE P FAST SERPL-MCNC: 82 MG/DL (ref 65–99)

## 2022-02-10 PROCEDURE — 82952 GTT-ADDED SAMPLES: CPT

## 2022-02-10 PROCEDURE — 36415 COLL VENOUS BLD VENIPUNCTURE: CPT

## 2022-02-10 PROCEDURE — 82951 GLUCOSE TOLERANCE TEST (GTT): CPT

## 2022-02-16 ENCOUNTER — APPOINTMENT (OUTPATIENT)
Dept: LAB | Facility: MEDICAL CENTER | Age: 32
End: 2022-02-16
Payer: COMMERCIAL

## 2022-02-16 DIAGNOSIS — Z11.3 SCREENING FOR STDS (SEXUALLY TRANSMITTED DISEASES): ICD-10-CM

## 2022-02-16 DIAGNOSIS — Z34.82 ENCOUNTER FOR SUPERVISION OF OTHER NORMAL PREGNANCY IN SECOND TRIMESTER: ICD-10-CM

## 2022-02-16 LAB
BASOPHILS # BLD AUTO: 0.05 THOUSANDS/ΜL (ref 0–0.1)
BASOPHILS NFR BLD AUTO: 1 % (ref 0–1)
EOSINOPHIL # BLD AUTO: 0.06 THOUSAND/ΜL (ref 0–0.61)
EOSINOPHIL NFR BLD AUTO: 1 % (ref 0–6)
ERYTHROCYTE [DISTWIDTH] IN BLOOD BY AUTOMATED COUNT: 13.6 % (ref 11.6–15.1)
HCT VFR BLD AUTO: 38.7 % (ref 34.8–46.1)
HGB BLD-MCNC: 12.4 G/DL (ref 11.5–15.4)
IMM GRANULOCYTES # BLD AUTO: 0.12 THOUSAND/UL (ref 0–0.2)
IMM GRANULOCYTES NFR BLD AUTO: 2 % (ref 0–2)
LYMPHOCYTES # BLD AUTO: 2.01 THOUSANDS/ΜL (ref 0.6–4.47)
LYMPHOCYTES NFR BLD AUTO: 25 % (ref 14–44)
MCH RBC QN AUTO: 30 PG (ref 26.8–34.3)
MCHC RBC AUTO-ENTMCNC: 32 G/DL (ref 31.4–37.4)
MCV RBC AUTO: 94 FL (ref 82–98)
MONOCYTES # BLD AUTO: 0.4 THOUSAND/ΜL (ref 0.17–1.22)
MONOCYTES NFR BLD AUTO: 5 % (ref 4–12)
NEUTROPHILS # BLD AUTO: 5.3 THOUSANDS/ΜL (ref 1.85–7.62)
NEUTS SEG NFR BLD AUTO: 66 % (ref 43–75)
NRBC BLD AUTO-RTO: 0 /100 WBCS
PLATELET # BLD AUTO: 171 THOUSANDS/UL (ref 149–390)
PMV BLD AUTO: 11.1 FL (ref 8.9–12.7)
RBC # BLD AUTO: 4.14 MILLION/UL (ref 3.81–5.12)
RPR SER QL: NORMAL
WBC # BLD AUTO: 7.94 THOUSAND/UL (ref 4.31–10.16)

## 2022-02-16 PROCEDURE — 85025 COMPLETE CBC W/AUTO DIFF WBC: CPT

## 2022-02-16 PROCEDURE — 36415 COLL VENOUS BLD VENIPUNCTURE: CPT

## 2022-02-16 PROCEDURE — 86592 SYPHILIS TEST NON-TREP QUAL: CPT

## 2022-02-18 ENCOUNTER — ROUTINE PRENATAL (OUTPATIENT)
Dept: OBGYN CLINIC | Facility: MEDICAL CENTER | Age: 32
End: 2022-02-18
Payer: COMMERCIAL

## 2022-02-18 VITALS
BODY MASS INDEX: 27.92 KG/M2 | HEIGHT: 68 IN | SYSTOLIC BLOOD PRESSURE: 118 MMHG | WEIGHT: 184.2 LBS | DIASTOLIC BLOOD PRESSURE: 82 MMHG

## 2022-02-18 DIAGNOSIS — Z23 NEED FOR DIPHTHERIA-TETANUS-PERTUSSIS (TDAP) VACCINE: ICD-10-CM

## 2022-02-18 DIAGNOSIS — Z34.82 ENCOUNTER FOR SUPERVISION OF OTHER NORMAL PREGNANCY IN SECOND TRIMESTER: Primary | ICD-10-CM

## 2022-02-18 DIAGNOSIS — O09.299 HISTORY OF MACROSOMIA IN INFANT IN PRIOR PREGNANCY, CURRENTLY PREGNANT: ICD-10-CM

## 2022-02-18 DIAGNOSIS — O99.810 ABNORMAL GLUCOSE AFFECTING PREGNANCY: ICD-10-CM

## 2022-02-18 PROBLEM — Z3A.28 28 WEEKS GESTATION OF PREGNANCY: Status: ACTIVE | Noted: 2021-12-21

## 2022-02-18 LAB
SL AMB  POCT GLUCOSE, UA: NEGATIVE
SL AMB POCT URINE PROTEIN: ABNORMAL

## 2022-02-18 PROCEDURE — PNV: Performed by: STUDENT IN AN ORGANIZED HEALTH CARE EDUCATION/TRAINING PROGRAM

## 2022-02-18 PROCEDURE — 90715 TDAP VACCINE 7 YRS/> IM: CPT

## 2022-02-18 PROCEDURE — 90471 IMMUNIZATION ADMIN: CPT

## 2022-02-18 NOTE — PROGRESS NOTES
Patient presenting for routine prenatal visit  Patient is 28W6D, ANIA 05/07/2022  Patient having good fetal movement, denies any bleeding, cramping or LOF  Up to date with flu vaccine, offered tdap  tdap given, VIS given  Tolerated well    Yellow folder given and reviewed    Delivery consent signed  Urine trace of proteins/neg    Faxed stork pump at 9:39am on 02/18/2022

## 2022-02-18 NOTE — PROGRESS NOTES
Abnormal glucose affecting pregnancy  Early and 28 wk 3 hour normal  28 wk 1/4 elevated  Plans third tri growth given prior macrosomia    Encounter for supervision of normal pregnancy in second trimester  35 yo  at 28+6 here for routine ob visit  She is feeling well  No contractions, leaking or bleeding  Good fetal movement  S/p flu tdap and covid vaccines and booster  Return in 2 wks      History of macrosomia in infant in prior pregnancy, currently pregnant  Referral for 32 wk growth to help guide delivery planning (labor vs IOL)

## 2022-02-18 NOTE — ASSESSMENT & PLAN NOTE
37 yo  at 28+6 here for routine ob visit  She is feeling well  No contractions, leaking or bleeding  Good fetal movement  S/p flu tdap and covid vaccines and booster  Return in 2 wks

## 2022-03-03 ENCOUNTER — ROUTINE PRENATAL (OUTPATIENT)
Dept: OBGYN CLINIC | Facility: MEDICAL CENTER | Age: 32
End: 2022-03-03

## 2022-03-03 VITALS — SYSTOLIC BLOOD PRESSURE: 118 MMHG | BODY MASS INDEX: 28.59 KG/M2 | DIASTOLIC BLOOD PRESSURE: 66 MMHG | WEIGHT: 188 LBS

## 2022-03-03 DIAGNOSIS — O09.299 HISTORY OF MACROSOMIA IN INFANT IN PRIOR PREGNANCY, CURRENTLY PREGNANT: ICD-10-CM

## 2022-03-03 DIAGNOSIS — Z34.82 ENCOUNTER FOR SUPERVISION OF OTHER NORMAL PREGNANCY IN SECOND TRIMESTER: ICD-10-CM

## 2022-03-03 DIAGNOSIS — O99.810 ABNORMAL GLUCOSE AFFECTING PREGNANCY: ICD-10-CM

## 2022-03-03 DIAGNOSIS — Z34.83 PRENATAL CARE, SUBSEQUENT PREGNANCY, THIRD TRIMESTER: Primary | ICD-10-CM

## 2022-03-03 PROBLEM — Z3A.30 30 WEEKS GESTATION OF PREGNANCY: Status: ACTIVE | Noted: 2021-12-21

## 2022-03-03 LAB
SL AMB  POCT GLUCOSE, UA: NORMAL
SL AMB POCT URINE PROTEIN: NORMAL

## 2022-03-03 PROCEDURE — PNV: Performed by: PHYSICIAN ASSISTANT

## 2022-03-03 NOTE — PROGRESS NOTES
Abnormal glucose affecting pregnancy  Normal 3 hour at 28 weeks (1 of 4 elevated)  Third trimester growth scheduled 3/15 for hx of prior macrosomia  Encounter for supervision of normal pregnancy in second trimester  Holger Flor  is a 36 y o   @30w5d who presents for routine prenatal visit  Denies LOF, vaginal bleeding, regular uterine contractions, cramping, headaches or visual changes  Reports good fetal movement  S/p flu, tdap, and COVID + booster  Normal 28 week labs  TWG 25#  She is walking daily  Plans to breastfeed  Planning to obtain pump on her own from insurance  Birth plan returned today  Consent signed  Has pediatrician and car seat  Reviewed PTL/Labor precautions and FKC  History of macrosomia in infant in prior pregnancy, currently pregnant  3/15 growth scan

## 2022-03-03 NOTE — PROGRESS NOTES
Patient here for PN visit  She denies any complaints  Good fetal movement  US scheduled for 3/15  Up to date with vaccines  28 week labs completed  Passed 3 hour GTT  Urine neg for protein and glucose

## 2022-03-03 NOTE — ASSESSMENT & PLAN NOTE
Normal 3 hour at 28 weeks (1 of 4 elevated)  Third trimester growth scheduled 3/15 for hx of prior macrosomia

## 2022-03-03 NOTE — ASSESSMENT & PLAN NOTE
Vadim Forte  is a 36 y o   @30w5d who presents for routine prenatal visit  Denies LOF, vaginal bleeding, regular uterine contractions, cramping, headaches or visual changes  Reports good fetal movement  S/p flu, tdap, and COVID + booster  Normal 28 week labs  TWG 25#  She is walking daily  Plans to breastfeed  Planning to obtain pump on her own from insurance  Birth plan returned today  Consent signed  Has pediatrician and car seat  Reviewed PTL/Labor precautions and FKC

## 2022-03-14 ENCOUNTER — ROUTINE PRENATAL (OUTPATIENT)
Dept: OBGYN CLINIC | Facility: MEDICAL CENTER | Age: 32
End: 2022-03-14

## 2022-03-14 VITALS — BODY MASS INDEX: 28.25 KG/M2 | SYSTOLIC BLOOD PRESSURE: 102 MMHG | DIASTOLIC BLOOD PRESSURE: 74 MMHG | WEIGHT: 185.8 LBS

## 2022-03-14 DIAGNOSIS — Z34.83 ENCOUNTER FOR SUPERVISION OF OTHER NORMAL PREGNANCY IN THIRD TRIMESTER: ICD-10-CM

## 2022-03-14 DIAGNOSIS — Z34.83 PRENATAL CARE, SUBSEQUENT PREGNANCY, THIRD TRIMESTER: Primary | ICD-10-CM

## 2022-03-14 DIAGNOSIS — O09.299 HISTORY OF MACROSOMIA IN INFANT IN PRIOR PREGNANCY, CURRENTLY PREGNANT: ICD-10-CM

## 2022-03-14 PROBLEM — Z34.93 ENCOUNTER FOR SUPERVISION OF NORMAL PREGNANCY IN THIRD TRIMESTER: Status: ACTIVE | Noted: 2021-10-25

## 2022-03-14 LAB
SL AMB  POCT GLUCOSE, UA: NORMAL
SL AMB POCT URINE PROTEIN: NORMAL

## 2022-03-14 PROCEDURE — PNV: Performed by: PHYSICIAN ASSISTANT

## 2022-03-14 NOTE — PROGRESS NOTES
Feels well, she has no complaints   Nl FM   Denies any bleeding or cramping   S/P  covid X 3, flu and Tdap vaccine  28 wk labs normal

## 2022-03-14 NOTE — PROGRESS NOTES
Problem List Items Addressed This Visit        Other    Encounter for supervision of normal pregnancy in third trimester     36 y o  female here for routine PN visit at [facility] well overall  Good fetal movement    S/p flu vaccine, COVID vaccine and TDAP   1hr gtt elevated @ 165, 3hr gtt with 3/4 normal values   Has breast pump, has a pediatrician  Discussed perineal massage  Delivery consent previously signed  Birth plan previously returned  History of macrosomia in infant in prior pregnancy, currently pregnant     Recent growth scan normal, has no MFM f/u scheduled              Other Visit Diagnoses     Prenatal care, subsequent pregnancy, third trimester    -  Primary    Relevant Orders    POCT urine dip (Completed)

## 2022-03-14 NOTE — ASSESSMENT & PLAN NOTE
36 y o  female here for routine PN visit at [facility] well overall  Good fetal movement    S/p flu vaccine, COVID vaccine and TDAP   1hr gtt elevated @ 165, 3hr gtt with 3/4 normal values   Has breast pump, has a pediatrician  Discussed perineal massage  Delivery consent previously signed  Birth plan previously returned

## 2022-03-15 ENCOUNTER — ULTRASOUND (OUTPATIENT)
Dept: PERINATAL CARE | Facility: OTHER | Age: 32
End: 2022-03-15
Payer: COMMERCIAL

## 2022-03-15 VITALS
HEIGHT: 68 IN | DIASTOLIC BLOOD PRESSURE: 80 MMHG | WEIGHT: 186.2 LBS | SYSTOLIC BLOOD PRESSURE: 112 MMHG | BODY MASS INDEX: 28.22 KG/M2 | HEART RATE: 90 BPM

## 2022-03-15 DIAGNOSIS — O99.810 ABNORMAL GLUCOSE AFFECTING PREGNANCY: ICD-10-CM

## 2022-03-15 DIAGNOSIS — O09.299 HISTORY OF MACROSOMIA IN INFANT IN PRIOR PREGNANCY, CURRENTLY PREGNANT: ICD-10-CM

## 2022-03-15 DIAGNOSIS — Z3A.32 32 WEEKS GESTATION OF PREGNANCY: Primary | ICD-10-CM

## 2022-03-15 PROCEDURE — 76816 OB US FOLLOW-UP PER FETUS: CPT | Performed by: OBSTETRICS & GYNECOLOGY

## 2022-03-15 PROCEDURE — 99212 OFFICE O/P EST SF 10 MIN: CPT | Performed by: OBSTETRICS & GYNECOLOGY

## 2022-03-15 NOTE — LETTER
March 17, 2022     Arnie Nobles, [address]  [address] [address]    Patient: Mariaelena Conde   YOB: 1990   Date of Visit: 3/15/2022       Dear Dr Yesenia Mccarty: Thank you for referring Audrey Richards to me for evaluation  Below are my notes for this consultation  If you have questions, please do not hesitate to call me  I look forward to following your patient along with you  Sincerely,        Marcell Jason MD        CC: No Recipients  Marcell Jason MD  3/17/2022  1:12 PM  Incomplete  A fetal ultrasound was completed  See Ob procedures in Epic for an interpretation and recommendations  Do not hesitate to contact us in [facility] if you have questions  Nehal Horton MD, [address] [place] [address]  Maternal Fetal Medicine

## 2022-03-16 ENCOUNTER — TELEPHONE (OUTPATIENT)
Dept: OBGYN CLINIC | Facility: CLINIC | Age: 32
End: 2022-03-16

## 2022-03-16 NOTE — TELEPHONE ENCOUNTER
----- Message from Romelia Humphrey sent at 3/16/2022 12:16 PM EDT -----  Regarding: Blood Test Results  Hello! I had blood work done for my yearly physical   My PCP wanted me to get some additional tests, but then when I mentioned that I'm pregnant, she suggested that I follow up to see if my OB wanted to do any additional testing  I assume you could view the results in my chart  I believe they were concerned about my albumin being low and a small amount of protein in my urine  My cholesterol was also high (as it was with my last pregnancy), but she said we'd discuss that at my visit next week  Please let me know if there is anything I need to do at this time      Thank you,    Prieto Brizuela

## 2022-03-16 NOTE — TELEPHONE ENCOUNTER
Low albumin in pregnancy is very normal, it is related to the change of your blood volume with your pregnancy  Low albumin affects the calcium as well and when you correct for this your calcium is normal  High cholesterol is also normal in pregnancy related to hormonal changes- it typically normalizes after delivery  The protein in your urine can be related to dehydration (looks like you went first thing in the am) your urine dip was negative at your last visit- we check this at each visit so will keep a close eye  Overall things look good!

## 2022-03-17 PROBLEM — Z3A.32 32 WEEKS GESTATION OF PREGNANCY: Status: ACTIVE | Noted: 2021-12-21

## 2022-03-17 NOTE — PROGRESS NOTES
A fetal ultrasound was completed  See Ob procedures in Epic for an interpretation and recommendations  Do not hesitate to contact us in [facility] if you have questions  Michael Lin MD, [address] [facility]  Maternal Fetal Medicine

## 2022-03-28 ENCOUNTER — ROUTINE PRENATAL (OUTPATIENT)
Dept: OBGYN CLINIC | Facility: MEDICAL CENTER | Age: 32
End: 2022-03-28

## 2022-03-28 VITALS — SYSTOLIC BLOOD PRESSURE: 120 MMHG | WEIGHT: 189.2 LBS | BODY MASS INDEX: 28.77 KG/M2 | DIASTOLIC BLOOD PRESSURE: 68 MMHG

## 2022-03-28 DIAGNOSIS — O99.810 ABNORMAL GLUCOSE AFFECTING PREGNANCY: ICD-10-CM

## 2022-03-28 DIAGNOSIS — O09.299 HISTORY OF MACROSOMIA IN INFANT IN PRIOR PREGNANCY, CURRENTLY PREGNANT: ICD-10-CM

## 2022-03-28 DIAGNOSIS — Z34.83 PRENATAL CARE, SUBSEQUENT PREGNANCY, THIRD TRIMESTER: Primary | ICD-10-CM

## 2022-03-28 DIAGNOSIS — Z34.83 ENCOUNTER FOR SUPERVISION OF OTHER NORMAL PREGNANCY IN THIRD TRIMESTER: ICD-10-CM

## 2022-03-28 LAB
SL AMB  POCT GLUCOSE, UA: NORMAL
SL AMB POCT URINE PROTEIN: NORMAL

## 2022-03-28 PROCEDURE — PNV: Performed by: NURSE PRACTITIONER

## 2022-03-28 NOTE — PROGRESS NOTES
Patient presents for a routine prenatal visit    34W2D  Good Fetal Movement  No LOF,bleeding,discharge or cramping  No current complaints at this time       Urine:-/-

## 2022-03-28 NOTE — ASSESSMENT & PLAN NOTE
Hill Ferguson  is a 36 y o   @34w2d who presents for routine prenatal visit  Denies LOF, vaginal bleeding, regular uterine contractions, cramping, headaches or visual changes  Reports good fetal movement  Taking PNV daily as prescribed  Flu, COVID and TDap up to date  Reviewed PTL/Labor precautions and FKC  GBS @ next visit   Plans to breastfeed    Having a boy  Has pump, peds, consent and birth plan turned in  Has yellow packet

## 2022-03-28 NOTE — PROGRESS NOTES
Encounter for supervision of normal pregnancy in third trimester    Kaleb Herrera  is a 36 y o   @34w2d who presents for routine prenatal visit  Denies LOF, vaginal bleeding, regular uterine contractions, cramping, headaches or visual changes  Reports good fetal movement  Taking PNV daily as prescribed  Flu, COVID and TDap up to date  Reviewed PTL/Labor precautions and FKC  GBS @ next visit   Plans to breastfeed  Having a boy  Has pump, peds, consent and birth plan turned in  Has yellow packet            History of macrosomia in infant in prior pregnancy, currently pregnant  3/15 AC 49%, HC 47%, growth 50% and JANAK=12 0  No further scheduled       Abnormal glucose affecting pregnancy  3/ normal on 3 HR GTT

## 2022-03-28 NOTE — PATIENT INSTRUCTIONS
Pregnancy at [address] to [address]:   What changes are happening with my body? You may continue to have symptoms such as shortness of breath, heartburn, contractions, or swelling of your ankles and feet  You may be gaining about 1 pound a week now  How do I care for myself at this stage of my pregnancy? · Eat a variety of healthy foods  Healthy foods include fruits, vegetables, whole-grain breads, low-fat dairy foods, beans, lean meats, and fish  Drink liquids as directed  Ask how much liquid to drink each day and which liquids are best for you  Limit caffeine to less than 200 milligrams each day  Limit your intake of fish to 2 servings each week  Choose fish low in mercury such as canned light tuna, shrimp, salmon, cod, or tilapia  Do not  eat fish high in mercury such as swordfish, tilefish, deyanira mackerel, and shark  · Manage heartburn  by eating 4 or 5 small meals each day instead of large meals  Avoid spicy food  · Manage swelling  by lying down and putting your feet up  · Take prenatal vitamins as directed  Your need for certain vitamins and minerals, such as folic acid, increases during pregnancy  Prenatal vitamins provide some of the extra vitamins and minerals you need  Prenatal vitamins may also help to decrease the risk of certain birth defects  · Talk to your healthcare provider about exercise  Moderate exercise can help you stay fit  Your healthcare provider will help you plan an exercise program that is safe for you during pregnancy  · Do not smoke  Smoking increases your risk of a miscarriage and other health problems during your pregnancy  Smoking can cause your baby to be born too early or weigh less at birth  Ask your healthcare provider for information if you need help quitting  · Do not drink alcohol  Alcohol passes from your body to your baby through the placenta   It can affect your baby's brain development and cause fetal alcohol syndrome (FAS)  FAS is a group of conditions that causes mental, behavior, and growth problems  · Talk to your healthcare provider before you take any medicines  Many medicines may harm your baby if you take them when you are pregnant  Do not take any medicines, vitamins, herbs, or supplements without first talking to your healthcare provider  Never use illegal or street drugs (such as marijuana or cocaine) while you are pregnant  What are some safety tips during pregnancy? · Avoid hot tubs and saunas  Do not use a hot tub or sauna while you are pregnant, especially during your first trimester  Hot tubs and saunas may raise your baby's temperature and increase the risk of birth defects  · Avoid toxoplasmosis  This is an infection caused by eating raw meat or being around infected cat feces  It can cause birth defects, miscarriages, and other problems  Wash your hands after you touch raw meat  Make sure any meat is well-cooked before you eat it  Avoid raw eggs and unpasteurized milk  Use gloves or ask someone else to clean your cat's litter box while you are pregnant  What changes are happening with my baby? By 34 weeks, your baby may weigh more than 5 pounds  Your baby will be about 12 ½ inches long from the top of the head to the rump (baby's bottom)  Your baby is gaining about ½ pound a week  Your baby's eyes open and close now  Your baby's kicks and movements are more forceful at this time  What do I need to know about prenatal care? Your healthcare provider will check your blood pressure and weight  You may also need the following:  · A urine test  may also be done to check for sugar and protein  These can be signs of gestational diabetes or infection  Protein in your urine may also be a sign of preeclampsia  Preeclampsia is a condition that can develop during week 20 or later of your pregnancy   It causes high blood pressure, and it can cause problems with your kidneys and other organs  · A gestational diabetes screen  may be done  Your healthcare provider may order either a 1-step or 2-step oral glucose tolerance test (OGTT)  ? 1-step OGTT:  Your blood sugar level will be tested after you have not eaten for 8 hours (fasting)  You will then be given a glucose drink  Your level will be tested again 1 hour and 2 hours after you finish the drink  ? 2-step OGTT:  You do not have to fast for the first part of the test  You will have the glucose drink at any time of day  Your blood sugar level will be checked 1 hour later  If your blood sugar is higher than a certain level, another test will be ordered  You will fast and your blood sugar level will be tested  You will have the glucose drink  Your blood will be tested again 1 hour, 2 hours, and 3 hours after you finish the glucose drink  · A Tdap vaccine  may be recommended by your healthcare provider  · Fundal height  is a measurement of your uterus to check your baby's growth  This number is usually the same as the number of weeks that you have been pregnant  Your healthcare provider may also check your baby's position  · Your baby's heart rate  will be checked  When should I seek immediate care? · You develop a severe headache that does not go away  · You have new or increased vision changes, such as blurred or spotted vision  · You have new or increased swelling in your face or hands  · You have vaginal spotting or bleeding  · Your water broke or you feel warm water gushing or trickling from your vagina  When should I call my obstetrician? · You have more than 5 contractions in 1 hour  · You notice any changes in your baby's movements  · You have abdominal cramps, pressure, or tightening  · You have a change in vaginal discharge  · You have chills or a fever  · You have vaginal itching, burning, or pain  · You have yellow, green, white, or foul-smelling vaginal discharge      · You have pain or burning when you urinate, less urine than usual, or pink or bloody urine  · You have questions or concerns about your condition or care  CARE AGREEMENT:   You have the right to help plan your care  Learn about your health condition and how it may be treated  Discuss treatment options with your healthcare providers to decide what care you want to receive  You always have the right to refuse treatment  The above information is an  only  It is not intended as medical advice for individual conditions or treatments  Talk to your doctor, nurse or pharmacist before following any medical regimen to see if it is safe and effective for you  © Copyright [employer] 2022 Information is for End User's use only and may not be sold, redistributed or otherwise used for commercial purposes   All illustrations and images included in CareNotes® are the copyrighted property of [employer]  or [address] [place] [address]

## 2022-04-07 ENCOUNTER — ROUTINE PRENATAL (OUTPATIENT)
Dept: OBGYN CLINIC | Facility: MEDICAL CENTER | Age: 32
End: 2022-04-07

## 2022-04-07 VITALS
HEIGHT: 68 IN | DIASTOLIC BLOOD PRESSURE: 70 MMHG | SYSTOLIC BLOOD PRESSURE: 120 MMHG | WEIGHT: 186 LBS | BODY MASS INDEX: 28.19 KG/M2

## 2022-04-07 DIAGNOSIS — Z34.83 ENCOUNTER FOR SUPERVISION OF NORMAL PREGNANCY IN MULTIGRAVIDA IN THIRD TRIMESTER: Primary | ICD-10-CM

## 2022-04-07 LAB
SL AMB  POCT GLUCOSE, UA: NEGATIVE
SL AMB POCT URINE PROTEIN: NORMAL

## 2022-04-07 PROCEDURE — PNV: Performed by: OBSTETRICS & GYNECOLOGY

## 2022-04-07 NOTE — PROGRESS NOTES
Problem   Encounter for Supervision of Normal Pregnancy in Third Trimester     Encounter for supervision of normal pregnancy in third trimester  Chapincito Vasquez is a 36 y o  [id number]  35w5d who presents for routine PNV  28 week labs reviewed:   TWG 10 4 kg (23 lb)   Denies OB complaints  Good fetal movement  Denies contractions, cramping, leakage of fluid or vaginal bleeding  Reports small lump just under her skin near the umbilicus, Palpable, moveable, firm, nodule, ovoid in shape, non tender, consistent with probable lipoma, will continue to monitor   S/p Tdap vaccine  Reviewed  labor precautions and FKCs     Advised to continue  Perineal massage  Pregnancy Essential guide and Baby and Me web site recommended

## 2022-04-07 NOTE — PATIENT INSTRUCTIONS
Pregnancy at 28 to 38 Weeks   AMBULATORY CARE:   Changes happening with your body: You are considered full term at the beginning of 37 weeks  Your breathing may be easier if your baby has moved down into a head-down position  You may need to urinate more often because the baby may be pressing on your bladder  You may also feel more discomfort and get tired easily  Seek care immediately if:   · You develop a severe headache that does not go away  · You have new or increased vision changes, such as blurred or spotted vision  · You have new or increased swelling in your face or hands  · You have vaginal spotting or bleeding  · Your water broke or you feel warm water gushing or trickling from your vagina  Call your obstetrician if:   · You have more than 5 contractions in 1 hour  · You notice any changes in your baby's movements  · You have abdominal cramps, pressure, or tightening  · You have a change in vaginal discharge  · You have chills or a fever  · You have vaginal itching, burning, or pain  · You have yellow, green, white, or foul-smelling vaginal discharge  · You have pain or burning when you urinate, less urine than usual, or pink or bloody urine  · You have questions or concerns about your condition or care  How to care for yourself at this stage of your pregnancy:       · Eat a variety of healthy foods  Healthy foods include fruits, vegetables, whole-grain breads, low-fat dairy foods, beans, lean meats, and fish  Drink liquids as directed  Ask how much liquid to drink each day and which liquids are best for you  Limit caffeine to less than 200 milligrams each day  Limit your intake of fish to 2 servings each week  Choose fish low in mercury such as canned light tuna, shrimp, salmon, cod, or tilapia  Do not  eat fish high in mercury such as swordfish, tilefish, deyanira mackerel, and shark  · Take prenatal vitamins as directed    Your need for certain vitamins and minerals, such as folic acid, increases during pregnancy  Prenatal vitamins provide some of the extra vitamins and minerals you need  Prenatal vitamins may also help to decrease the risk of certain birth defects  · Rest as needed  Put your feet up if you have swelling in your ankles and feet  · Talk to your healthcare provider about exercise  Moderate exercise can help you stay fit  Your healthcare provider will help you plan an exercise program that is safe for you during pregnancy  · Do not smoke  Smoking increases your risk of a miscarriage and other health problems during your pregnancy  Smoking can cause your baby to be born early or weigh less at birth  Ask your healthcare provider for information if you need help quitting  · Do not drink alcohol  Alcohol passes from your body to your baby through the placenta  It can affect your baby's brain development and cause fetal alcohol syndrome (FAS)  FAS is a group of conditions that causes mental, behavior, and growth problems  · Talk to your healthcare provider before you take any medicines  Many medicines may harm your baby if you take them when you are pregnant  Do not take any medicines, vitamins, herbs, or supplements without first talking to your healthcare provider  Never use illegal or street drugs (such as marijuana or cocaine) while you are pregnant  Safety tips during pregnancy:   · Avoid hot tubs and saunas  Do not use a hot tub or sauna while you are pregnant, especially during your first trimester  Hot tubs and saunas may raise your baby's temperature and increase the risk of birth defects  · Avoid toxoplasmosis  This is an infection caused by eating raw meat or being around infected cat feces  It can cause birth defects, miscarriages, and other problems  Wash your hands after you touch raw meat  Make sure any meat is well-cooked before you eat it  Avoid raw eggs and unpasteurized milk   Use gloves or ask someone else to clean your cat's litter box while you are pregnant  · Ask your healthcare provider about travel  The most comfortable time to travel is during the second trimester  Ask your provider if you can travel after 36 weeks  You may not be able to travel in an airplane after 36 weeks  He or she may also recommend you avoid long road trips  Changes happening with your baby:  By 38 weeks, your baby may weigh between 6 and 9 pounds  Your baby may be about 14 inches long from the top of the head to the rump (baby's bottom)  Your baby hears well enough to know your voice  As your baby gets larger, you may feel fewer kicks and more stretching and rolling  Your baby may move into a head-down position  Your baby will also rest lower in your abdomen  What you need to know about prenatal care: Your healthcare provider will check your blood pressure and weight  You may also need the following:  · A urine test  may also be done to check for sugar and protein  These can be signs of gestational diabetes or infection  Protein in your urine may also be a sign of preeclampsia  Preeclampsia is a condition that can develop during week 20 or later of your pregnancy  It causes high blood pressure, and it can cause problems with your kidneys and other organs  · A gestational diabetes screen  may be done  Your healthcare provider may order either a 1-step or 2-step oral glucose tolerance test (OGTT)  ? 1-step OGTT:  Your blood sugar level will be tested after you have not eaten for 8 hours (fasting)  You will then be given a glucose drink  Your level will be tested again 1 hour and 2 hours after you finish the drink  ? 2-step OGTT:  You do not have to fast for the first part of the test  You will have the glucose drink at any time of day  Your blood sugar level will be checked 1 hour later  If your blood sugar is higher than a certain level, another test will be ordered   You will fast and your blood sugar level will be tested  You will have the glucose drink  Your blood will be tested again 1 hour, 2 hours, and 3 hours after you finish the glucose drink  · A blood test  may be done to check for anemia (low iron level)  · A Tdap vaccine  may be recommended by your healthcare provider  · A group B strep test  is a test that is done to check for group B strep infection  Group B strep is a type of bacteria that may be found in the vagina or rectum  It can be passed to your baby during delivery if you have it  Your healthcare provider will take swab your vagina or rectum and send the sample to the lab for tests  · Fundal height  is a measurement of your uterus to check your baby's growth  This number is usually the same as the number of weeks that you have been pregnant  Your healthcare provider may also check your baby's position  · Your baby's heart rate  will be checked  Follow up with your obstetrician as directed:  Write down your questions so you remember to ask them during your visits  © [employer] 2022 Information is for End User's use only and may not be sold, redistributed or otherwise used for commercial purposes  All illustrations and images included in CareNotes® are the copyrighted property of A D A M , Inc  or [employer]   The above information is an  only  It is not intended as medical advice for individual conditions or treatments  Talk to your doctor, nurse or pharmacist before following any medical regimen to see if it is safe and effective for you  Kick Counts in Pregnancy   AMBULATORY CARE:   Kick counts  measure how much your baby is moving in your womb  A kick from your baby can be felt as a twist, turn, swish, roll, or jab  It is common to feel your baby kicking at 26 to 28 weeks of pregnancy  You may feel your baby kick as early as 20 weeks of pregnancy  You may want to start counting at 28 weeks     Contact your doctor immediately if: · You feel a change in the number of kicks or movements of your baby  · You feel fewer than 10 kicks within 2 hours  · You have questions or concerns about your baby's movements  Why measure kick counts:  Your baby's movement may provide information about your baby's health  He or she may move less, or not at all, if there are problems  Your baby may move less if he or she is not getting enough oxygen or nutrition from the placenta  Do not smoke while you are pregnant  Smoking decreases the amount of oxygen that gets to your baby  Talk to your healthcare provider if you need help to quit smoking  Tell your healthcare provider as soon as you feel a change in your baby's movements  When to measure kick counts:   · Measure kick counts at the same time every day  · Measure kick counts when your baby is awake and most active  Your baby may be most active in the evening  How to measure kick counts:  Check that your baby is awake before you measure kick counts  You can wake up your baby by lightly pushing on your belly, walking, or drinking something cold  Your healthcare provider may tell you different ways to measure kick counts  You may be told to do the following:  · Use a chart or clock to keep track of the time you start and finish counting  · Sit in a chair or lie on your left side  · Place your hands on the largest part of your belly  · Count until you reach 10 kicks  Write down how much time it takes to count 10 kicks  · It may take 30 minutes to 2 hours to count 10 kicks  It should not take more than 2 hours to count 10 kicks  Follow up with your doctor as directed:  Write down your questions so you remember to ask them during your visits  © Copyright [employer] 2022 Information is for End User's use only and may not be sold, redistributed or otherwise used for commercial purposes   All illustrations and images included in CareNotes® are the copyrighted property of [employer]  or [facility]  The above information is an  only  It is not intended as medical advice for individual conditions or treatments  Talk to your doctor, nurse or pharmacist before following any medical regimen to see if it is safe and effective for you  Perineal Massage    Perineal massage is recommended starting after 34 weeks in order to reduce risks of perineal tearing during childbirth  You have been provided and instructional sheet in your yellow 28 week prenatal packet  Early Labor Signs   AMBULATORY CARE:   Early labor signs and symptoms  are the changes in your body that signal your baby is getting ready to be delivered  Early labor signs can happen weeks, days or hours before delivery  Call 911 for any of the following:   · You have heavy vaginal bleeding  · You cannot get to the hospital before the baby starts to come out  Seek care immediately if:   · You have regular, painful contractions that are less than 5 minutes apart and last 30 to 70 seconds each  · You have a constant trickle or sudden gush of clear fluid from your vagina  · You notice a sudden decrease in your baby's movement  Contact your obstetrician or healthcare provider if:   · You have pain in your lower back or abdomen that does not get better when you change positions  · You have bloody mucus or show  · You have questions or concerns about your condition or care  Early labor signs and symptoms:   · Lightening  occurs when your baby drops inside your pelvis  You may feel increased pressure in your pelvis  This may happen a few weeks to a few hours before your labor begins  · Contractions  are cramps and tightening that occur in your uterus to help move the baby through your birth canal  Contractions occur regularly and more often each time  Each one lasts about 30 to 70 seconds, and gets stronger until you deliver your baby   Contractions do not go away with movement  The pain usually starts in your lower back and moves to your abdomen  · Effacement  occurs when your cervix softens and thins, so it can easily open for the baby  You will not be able to feel effacement  Your healthcare provider will examine your cervix for effacement  · Dilation  is widening of your cervix  Your healthcare provider will examine your cervix for dilation  Your cervix may start to dilate weeks before your baby is delivered  Your cervix will be fully opened and ready for delivery when it is dilated to 10 centimeters  · Increased discharge  from your vagina may occur  It may be brown, pink, clear, or slightly bloody  This discharge may also be called bloody show  Bloody show is a mucus plug that forms and blocks your cervix during pregnancy  The discharge may mean that your cervix is opening up and getting ready for delivery  · Rupture of membranes  is a sudden release of clear fluid from your vagina  Ruptured membranes means your water broke  Your healthcare provider may need to break your water if it does not happen on its own  False labor: You may have false labor signs, which are also called [place] Guevara contractions  False labor is common and may happen several weeks or days before your actual labor  The contractions are not regular, and do not get closer together  The pain is usually mild, does not worsen, and is felt only in front  [place] Guevara contractions may happen later in the day, and stop after you change position, walk, or rest   Follow up with your obstetrician or healthcare provider as directed:  Write down your questions so you remember to ask them during your visit  © [employer] 2022 Information is for End User's use only and may not be sold, redistributed or otherwise used for commercial purposes   All illustrations and images included in CareNotes® are the copyrighted property of [employer]  or Jaimie Enriquez  The above information is an  only  It is not intended as medical advice for individual conditions or treatments  Talk to your doctor, nurse or pharmacist before following any medical regimen to see if it is safe and effective for you  Having Your Baby: The Labor Process   AMBULATORY CARE:   The labor process  is a series of 3 stages that your body goes through to deliver your baby  It is not known for sure what causes labor to begin  Hormones made by you and your baby and changes in your uterus may help labor to start  Labor usually starts 2 weeks before or after your due date  Most women do not have their baby exactly on their due date  Call your obstetrician if:   · You have vaginal spotting or bleeding  · Your water broke or you feel warm water gushing or trickling from your vagina  · You have more than 5 contractions in 1 hour  · You have bloody mucus or show  · You notice any changes in your baby's movements  · You have abdominal cramps, pressure, or tightening  · You have a change in vaginal discharge  · You have questions or concerns about your condition or care  First stage of labor: The first stage of labor includes latent (early) labor and active labor  This stage may last up to 12 hours if this is your first pregnancy  It may last up to 10 hours if you delivered a baby before  Your uterus will contract to prepare your cervix for delivery and to push your baby out of the birth canal  Your cervix will dilate (widen) and efface (soften and become thinner)  Your contractions may last from 30 to 60 seconds  The contractions usually start in the back and move to the front  You may also have a pink, clear, or slightly bloody discharge called bloody show  Bloody show is caused by the movement of a mucus plug from your cervix  During pregnancy the mucus plug blocks your cervix to prevent it from opening  What to do during early labor:  Early labor may last for several hours   You will most likely be at home during early labor  Rest as much as possible while you are at home  Have someone rub your back  It may be helpful to place ice packs on your lower back  Go for a short walk if you are able  Drink water and suck on ice chips  Ask your healthcare provider if it is okay to eat during early labor  How to know when you are in active labor: This stage may last up to 12 hours if this is your first pregnancy  It may last up to 10 hours if you delivered a baby before  Your contractions will get stronger, last longer, and happen more frequently  They will also become more intense and painful  Time your contractions from the beginning of one to the beginning of the next  Write this information down for 1 hour  Your healthcare provider will tell you when to go to the hospital or birthing center  This will be based on how many minutes apart your contractions are  Second stage of labor:  The second stage is the time between full cervix dilation and the birth of your baby  Your cervix will be completely dilated to 10 centimeters and your baby will be ready to be born  The second stage usually lasts 20 minutes to 2 hours  It may last up to 3 hours if this is your first baby  · You may be given antibiotics to fight a bacterial infection you have or prevent an infection during delivery  · Healthcare providers will help you find a position for giving birth that is comfortable for you  You can lie on your back, have your feet up in stirrups, or squat  · You may feel pressure on your rectum and the urge to push  This pressure is caused by the movement of your baby's head down the birth canal  Your healthcare provider will have you push when you feel the urge  He or she will guide your baby out of the birth canal  Forceps or suction may be used to help deliver your baby  You may also need an episiotomy (incision) to make the vaginal opening larger  This will make more room for your baby   Your perineum will be protected during delivery  This may be with a warm compress or massage of the area  · At least 1 minute after your baby is born, your healthcare provider will put clamps on the umbilical cord  The cord will then be cut  Your baby may be placed on your chest right away  He or she may also start breastfeeding  Third stage of labor:  The placenta (afterbirth) is delivered during this stage  After you give birth, your uterus will continue to contract to help push out the placenta  These contractions will begin 5 to 30 minutes after you give birth  Your healthcare provider will tell you when to push  You may have chills or shakiness during this stage  You may be given medicine to help prevent heavy bleeding that can happen during this stage  How to manage labor pain:  Pain can be managed naturally or with medicines  You can naturally manage pain by using relaxation methods and controlled breathing  There are different types of medicines that can be used to relieve pain while you are in labor  These medicines may be given through an IV or an epidural (thin catheter in your lower back)  Talk with your healthcare about your options for pain medicines if you choose to use them  Tell your provider if you prefer not to have any pain control medicines during labor  © Copyright [employer] 2022 Information is for End User's use only and may not be sold, redistributed or otherwise used for commercial purposes  All illustrations and images included in CareNotes® are the copyrighted property of [employer] M , Inc  or Jaimie Enriquez  The above information is an  only  It is not intended as medical advice for individual conditions or treatments  Talk to your doctor, nurse or pharmacist before following any medical regimen to see if it is safe and effective for you      [place] Guevara Contractions   AMBULATORY CARE:   Hanna [place] contractions  are tightening and squeezing of the muscles of your uterus (womb) during pregnancy  The uterine muscles control the uterus  [place] Guevara contractions stop on their own  They are not true labor contractions and do not cause your cervix (opening to your uterus) to dilate (open)  Common symptoms include the following:   · Pain or discomfort in your groin or lower abdomen that comes and goes    · Your contractions are short, and do not last longer each time they happen    · Your contractions do not get closer together each time    · Your contractions do not get stronger or more painful each time    · Your contractions stop when you change your position or rest    Seek care immediately if:   · You have bleeding from your vagina  · You have fluid leaking from your vagina that does not stop  · You feel a gush of fluid from your vagina  · Your contractions happen every 5 minutes or sooner, and last for more than 60 seconds  · Your contractions begin to feel stronger or more painful  · You feel a change in your baby's movement, or you feel fewer than 6 to 10 movements in an hour  Call your doctor or obstetrician if:   · You have a fever  · You have questions or concerns about your condition or care  Treatment for [place] Guevara contractions  may include pain medicine to relieve discomfort or pain or sedatives to relax the muscles of your uterus  If you are dehydrated, he or she may give you fluids through an IV or tell you to drink liquids  Self-care:   · Change your activity or your position  when you feel contractions begin  Walk if you have been lying or sitting  Lie down if you have been standing or walking  True labor will not stop by changing your position or activity  · Take a warm bath  to relax your body  · Drink more liquids  to prevent dehydration  Ask how much liquid to drink each day and which liquids are best for you  · Practice your labor breathing  to decrease your discomfort  This may help you get ready for true labor   Take slow, deep breaths, or fast, short breaths  Ask your healthcare provider how to practice labor breathing  Follow up with your doctor or obstetrician as directed:  Write down your questions so you remember to ask them during your visits  © Copyright [employer] 2022 Information is for End User's use only and may not be sold, redistributed or otherwise used for commercial purposes  All illustrations and images included in CareNotes® are the copyrighted property of A D A M , Inc  or [facility] Idalia Funez   The above information is an  only  It is not intended as medical advice for individual conditions or treatments  Talk to your doctor, nurse or pharmacist before following any medical regimen to see if it is safe and effective for you

## 2022-04-07 NOTE — ASSESSMENT & PLAN NOTE
Carlo Clifton is a 36 y o  [id number]  35w5d who presents for routine PNV  28 week labs reviewed:   TWG 10 4 kg (23 lb)   Denies OB complaints  Good fetal movement  Denies contractions, cramping, leakage of fluid or vaginal bleeding  Reports small lump just under her skin near the umbilicus, Palpable, moveable, firm, nodule, ovoid in shape, non tender, consistent with probable lipoma, will continue to monitor   S/p Tdap vaccine  Reviewed  labor precautions and FKCs     Advised to continue  Perineal massage  Pregnancy Essential guide and Baby and Me web site recommended [Follow-Up: _____] : a [unfilled] follow-up visit

## 2022-04-07 NOTE — PROGRESS NOTES
Patient here with concerns that she has a lump under her bell button she reports that it does not hurt ,  Patient is 35 weeks 5 days with no lost of fluids and no contractions at this time   Patient is here today  with her

## 2022-04-14 ENCOUNTER — ROUTINE PRENATAL (OUTPATIENT)
Dept: OBGYN CLINIC | Facility: MEDICAL CENTER | Age: 32
End: 2022-04-14

## 2022-04-14 VITALS
WEIGHT: 191.4 LBS | DIASTOLIC BLOOD PRESSURE: 82 MMHG | BODY MASS INDEX: 29.01 KG/M2 | HEIGHT: 68 IN | SYSTOLIC BLOOD PRESSURE: 124 MMHG

## 2022-04-14 DIAGNOSIS — Z34.83 ENCOUNTER FOR SUPERVISION OF NORMAL PREGNANCY IN MULTIGRAVIDA IN THIRD TRIMESTER: Primary | ICD-10-CM

## 2022-04-14 DIAGNOSIS — O99.810 ABNORMAL GLUCOSE AFFECTING PREGNANCY: ICD-10-CM

## 2022-04-14 DIAGNOSIS — O09.299 HISTORY OF MACROSOMIA IN INFANT IN PRIOR PREGNANCY, CURRENTLY PREGNANT: ICD-10-CM

## 2022-04-14 PROCEDURE — 87150 DNA/RNA AMPLIFIED PROBE: CPT | Performed by: STUDENT IN AN ORGANIZED HEALTH CARE EDUCATION/TRAINING PROGRAM

## 2022-04-14 PROCEDURE — PNV: Performed by: STUDENT IN AN ORGANIZED HEALTH CARE EDUCATION/TRAINING PROGRAM

## 2022-04-14 NOTE — PROGRESS NOTES
36 y o   at 36w5d presents for routine prenatal visit  She denies contractions/leakage of fluid/vaginal bleeding  She feels good fetal movement  Problem List Items Addressed This Visit        Other    History of macrosomia in infant in prior pregnancy, currently pregnant  3/15 EFW 50% 2034 g    Abnormal glucose affecting pregnancy   abnormal on 3 hr gtt      Other Visit Diagnoses     Encounter for supervision of normal pregnancy in multigravida in third trimester    -  Primary  F/u in 1 wk or prn   Labor precautions discussed    Relevant Orders    Strep B DNA probe, amplification

## 2022-04-16 LAB — GP B STREP DNA SPEC QL NAA+PROBE: NEGATIVE

## 2022-04-21 ENCOUNTER — ROUTINE PRENATAL (OUTPATIENT)
Dept: OBGYN CLINIC | Facility: MEDICAL CENTER | Age: 32
End: 2022-04-21

## 2022-04-21 VITALS — WEIGHT: 194.2 LBS | BODY MASS INDEX: 29.53 KG/M2 | DIASTOLIC BLOOD PRESSURE: 82 MMHG | SYSTOLIC BLOOD PRESSURE: 112 MMHG

## 2022-04-21 DIAGNOSIS — Z3A.37 37 WEEKS GESTATION OF PREGNANCY: ICD-10-CM

## 2022-04-21 DIAGNOSIS — O09.299 HISTORY OF MACROSOMIA IN INFANT IN PRIOR PREGNANCY, CURRENTLY PREGNANT: ICD-10-CM

## 2022-04-21 DIAGNOSIS — Z34.83 ENCOUNTER FOR SUPERVISION OF OTHER NORMAL PREGNANCY IN THIRD TRIMESTER: ICD-10-CM

## 2022-04-21 DIAGNOSIS — Z34.83 PRENATAL CARE, SUBSEQUENT PREGNANCY, THIRD TRIMESTER: Primary | ICD-10-CM

## 2022-04-21 LAB
SL AMB  POCT GLUCOSE, UA: NORMAL
SL AMB POCT URINE PROTEIN: NORMAL

## 2022-04-21 PROCEDURE — PNV: Performed by: PHYSICIAN ASSISTANT

## 2022-04-21 NOTE — PROGRESS NOTES
Problem List Items Addressed This Visit        Other    Encounter for supervision of normal pregnancy in third trimester    History of macrosomia in infant in prior pregnancy, currently pregnant     Normal growth on 3/15 US  No further recommended  37 weeks gestation of pregnancy     Camilla Quintanilla  is a 36 y o   @37w5d who presents for routine prenatal visit  Denies LOF, vaginal bleeding, regular uterine contractions, cramping, headaches or visual changes  Reports good fetal movement  28 wk labs -- WNL, had normal 3 hr  S/p TDAP  GBS negative  Plans to breastfeed  Reviewed PTL/Labor precautions and [facility]  Bags packed, car seat in, has ped              Other Visit Diagnoses     Prenatal care, subsequent pregnancy, third trimester    -  Primary    Relevant Orders    POCT urine dip

## 2022-04-21 NOTE — ASSESSMENT & PLAN NOTE
Benita Gonzalez  is a 36 y o   @37w5d who presents for routine prenatal visit  Denies LOF, vaginal bleeding, regular uterine contractions, cramping, headaches or visual changes  Reports good fetal movement  28 wk labs -- WNL, had normal 3 hr  S/p TDAP  GBS negative  Plans to breastfeed  Reviewed PTL/Labor precautions and [facility]KC  Bags packed, car seat in, has ped

## 2022-04-21 NOTE — PROGRESS NOTES
Patient here for PN visit  She denies any complaints  Good fetal movement  GBS neg  Up to date with vaccines  She would like her cervix checked

## 2022-04-28 ENCOUNTER — ROUTINE PRENATAL (OUTPATIENT)
Dept: OBGYN CLINIC | Facility: MEDICAL CENTER | Age: 32
End: 2022-04-28

## 2022-04-28 VITALS — BODY MASS INDEX: 29.25 KG/M2 | DIASTOLIC BLOOD PRESSURE: 80 MMHG | SYSTOLIC BLOOD PRESSURE: 122 MMHG | WEIGHT: 192.4 LBS

## 2022-04-28 DIAGNOSIS — Z34.83 PRENATAL CARE, SUBSEQUENT PREGNANCY, THIRD TRIMESTER: Primary | ICD-10-CM

## 2022-04-28 DIAGNOSIS — O99.810 ABNORMAL GLUCOSE AFFECTING PREGNANCY: ICD-10-CM

## 2022-04-28 DIAGNOSIS — O09.299 HISTORY OF MACROSOMIA IN INFANT IN PRIOR PREGNANCY, CURRENTLY PREGNANT: ICD-10-CM

## 2022-04-28 LAB
SL AMB  POCT GLUCOSE, UA: NORMAL
SL AMB POCT URINE PROTEIN: NORMAL

## 2022-04-28 PROCEDURE — PNV: Performed by: STUDENT IN AN ORGANIZED HEALTH CARE EDUCATION/TRAINING PROGRAM

## 2022-04-28 NOTE — PROGRESS NOTES
36 y o   at 38w5d presents for routine prenatal visit  She denies contractions/leakage of fluid/vaginal bleeding  She feels good fetal movement     Problem List Items Addressed This Visit        Other    History of macrosomia in infant in prior pregnancy, currently pregnant  3/15 EFW 50% 2034 g    Abnormal glucose affecting pregnancy   abnormal on 3 hr gtt      Other Visit Diagnoses     Prenatal care, subsequent pregnancy, third trimester    -  Primary  GBS neg  Declines 39 wk iol - aware we recommend iol by 41 wks  F/u in 1 wk

## 2022-04-28 NOTE — PROGRESS NOTES
Patient presents for a routine prenatal visit    38W5D  Good Fetal Movement  No LOF,bleeding, discharge or cramping  No current complaints at this time       Urine: -/-

## 2022-05-03 ENCOUNTER — ROUTINE PRENATAL (OUTPATIENT)
Dept: OBGYN CLINIC | Facility: MEDICAL CENTER | Age: 32
End: 2022-05-03

## 2022-05-03 ENCOUNTER — TELEPHONE (OUTPATIENT)
Dept: OBGYN CLINIC | Facility: CLINIC | Age: 32
End: 2022-05-03

## 2022-05-03 VITALS — WEIGHT: 192.6 LBS | DIASTOLIC BLOOD PRESSURE: 82 MMHG | SYSTOLIC BLOOD PRESSURE: 124 MMHG | BODY MASS INDEX: 29.28 KG/M2

## 2022-05-03 DIAGNOSIS — O09.299 HISTORY OF MACROSOMIA IN INFANT IN PRIOR PREGNANCY, CURRENTLY PREGNANT: ICD-10-CM

## 2022-05-03 DIAGNOSIS — Z3A.39 39 WEEKS GESTATION OF PREGNANCY: ICD-10-CM

## 2022-05-03 DIAGNOSIS — Z34.83 PRENATAL CARE, SUBSEQUENT PREGNANCY, THIRD TRIMESTER: Primary | ICD-10-CM

## 2022-05-03 LAB
SL AMB  POCT GLUCOSE, UA: NORMAL
SL AMB POCT URINE PROTEIN: NORMAL

## 2022-05-03 PROCEDURE — PNV: Performed by: PHYSICIAN ASSISTANT

## 2022-05-03 NOTE — TELEPHONE ENCOUNTER
Called L & D, per Venu Gómez, pt scheduled for IOL Thursday, 5/12/22/at 8pm, cervical ripening, into Friday, 5/13/22  Called pt to inform of date & time  Pt is aware of delivery at [facility]  Routed to [facility], on-call providers, Lukas Lee & Silvia

## 2022-05-03 NOTE — ASSESSMENT & PLAN NOTE
Juan Loera  is a 36 y o  [id number] @39w3d who presents for routine prenatal visit  Denies LOF, vaginal bleeding, regular uterine contractions, cramping, headaches or visual changes  Reports good fetal movement  S/p TDAP 2/18/22  GBS negative  Plans to breastfeed  Has pump  PP Contraception: condoms   Reviewed Labor precautions and [address]  Declines eIOL -- open to scheduling 41 week induction if goes post date  Message sent to triage for scheduling  Consent signed

## 2022-05-03 NOTE — PROGRESS NOTES
Problem List Items Addressed This Visit        Other    History of macrosomia in infant in prior pregnancy, currently pregnant    44 weeks gestation of pregnancy     Sebastián Han  is a 36 y o  [id number] @39w3d who presents for routine prenatal visit  Denies LOF, vaginal bleeding, regular uterine contractions, cramping, headaches or visual changes  Reports good fetal movement  S/p TDAP 2/18/22  GBS negative  Plans to breastfeed  Has pump  PP Contraception: condoms   Reviewed Labor precautions and [address]  Declines eIOL -- open to scheduling 41 week induction if goes post date  Message sent to triage for scheduling  Consent signed              Other Visit Diagnoses     Prenatal care, subsequent pregnancy, third trimester    -  Primary    Relevant Orders    POCT urine dip

## 2022-05-03 NOTE — TELEPHONE ENCOUNTER
----- Message from Phuong Matos PA-C sent at 5/3/2022 11:00 AM EDT -----  Regarding: iol  Procedure to be scheduled (IOL or CS): IOL  ANIA: Estimated Date of Delivery: 5/7/22  Indication for delivery: term   Requested date (s) of delivery: 5/14/21 (41 weeks)    If requested date is unavailable, is there a date by which the pt must be delivered?  5/14/21 (41 weeks)  Physician preference: No    If IOL, anticipated method: with cervical ripening

## 2022-05-03 NOTE — PROGRESS NOTES
Patient here for PN visit  She states she has BH contractions; denies spotting or LOF  Good fetal movement  She would like her cervix checked  GBS neg  Up to date with vaccines

## 2022-05-09 ENCOUNTER — ROUTINE PRENATAL (OUTPATIENT)
Dept: OBGYN CLINIC | Facility: MEDICAL CENTER | Age: 32
End: 2022-05-09

## 2022-05-09 VITALS — WEIGHT: 191 LBS | DIASTOLIC BLOOD PRESSURE: 74 MMHG | BODY MASS INDEX: 29.04 KG/M2 | SYSTOLIC BLOOD PRESSURE: 118 MMHG

## 2022-05-09 DIAGNOSIS — O99.810 ABNORMAL GLUCOSE AFFECTING PREGNANCY: ICD-10-CM

## 2022-05-09 DIAGNOSIS — Z34.83 PRENATAL CARE, SUBSEQUENT PREGNANCY, THIRD TRIMESTER: Primary | ICD-10-CM

## 2022-05-09 DIAGNOSIS — O09.299 HISTORY OF MACROSOMIA IN INFANT IN PRIOR PREGNANCY, CURRENTLY PREGNANT: ICD-10-CM

## 2022-05-09 DIAGNOSIS — Z34.83 ENCOUNTER FOR SUPERVISION OF OTHER NORMAL PREGNANCY IN THIRD TRIMESTER: ICD-10-CM

## 2022-05-09 PROBLEM — Z3A.40 40 WEEKS GESTATION OF PREGNANCY: Status: ACTIVE | Noted: 2021-12-21

## 2022-05-09 LAB
SL AMB  POCT GLUCOSE, UA: NORMAL
SL AMB POCT URINE PROTEIN: NORMAL

## 2022-05-09 PROCEDURE — PNV: Performed by: PHYSICIAN ASSISTANT

## 2022-05-09 NOTE — PROGRESS NOTES
Problem List Items Addressed This Visit        Other    Encounter for supervision of normal pregnancy in third trimester     36 y o  female here for routine PN visit at [facility] well overall  Good fetal movement  First OB labs - normal   Gc/chlamydia - 10/25/21 neg  Pap - 4/16/19 neg  Blue folder - has  Genetic screening - normal  28 week labs - CBC, RPR normal  See separate subheading regarding glucose   COVID vaccine - yes x 3  TDAP - received 2/18/22   Yellow folder - has  Breast pump - has  Pediatrician - has  Perineal massage - yes  Delivery consent - previously signed  GBS - neg    Declines eIOL at this time, agreeable to 41 week IOL (scheduled for 5/12/22 8pm for ripening               History of macrosomia in infant in prior pregnancy, currently pregnant     Normal growth scan          Abnormal glucose affecting pregnancy     Early 1hr gtt 165, 3hr gtt with 1/4 abnormal values  28 week labs - 3hr gtt with 1/4 abnormal values           Other Visit Diagnoses     Prenatal care, subsequent pregnancy, third trimester    -  Primary    Relevant Orders    POCT urine dip (Completed)

## 2022-05-09 NOTE — ASSESSMENT & PLAN NOTE
Early 1hr gtt 165, 3hr gtt with 1/4 abnormal values  28 week labs - 3hr gtt with 1/4 abnormal values

## 2022-05-09 NOTE — ASSESSMENT & PLAN NOTE
36 y o  female here for routine PN visit at [facility] well overall  Good fetal movement  First OB labs - normal   Gc/chlamydia - 10/25/21 neg  Pap - 4/16/19 neg  Blue folder - has  Genetic screening - normal  28 week labs - CBC, RPR normal  See separate subheading regarding glucose   COVID vaccine - yes x 3  TDAP - received 2/18/22   Yellow folder - has  Breast pump - has  Pediatrician - has  Perineal massage - yes  Delivery consent - previously signed  GBS - neg    Declines eIOL at this time, agreeable to 41 week IOL (scheduled for 5/12/22 8pm for ripening

## 2022-05-10 ENCOUNTER — TELEPHONE (OUTPATIENT)
Dept: OBGYN CLINIC | Facility: CLINIC | Age: 32
End: 2022-05-10

## 2022-05-10 ENCOUNTER — HOSPITAL ENCOUNTER (INPATIENT)
Facility: HOSPITAL | Age: 32
LOS: 2 days | Discharge: HOME | End: 2022-05-12
Attending: OBSTETRICS & GYNECOLOGY | Admitting: OBSTETRICS & GYNECOLOGY
Payer: COMMERCIAL

## 2022-05-10 ENCOUNTER — ANESTHESIA (INPATIENT)
Dept: LABOR AND DELIVERY | Facility: HOSPITAL | Age: 32
End: 2022-05-10
Payer: COMMERCIAL

## 2022-05-10 ENCOUNTER — ANESTHESIA EVENT (INPATIENT)
Dept: LABOR AND DELIVERY | Facility: HOSPITAL | Age: 32
End: 2022-05-10
Payer: COMMERCIAL

## 2022-05-10 DIAGNOSIS — Z98.891 S/P CESAREAN SECTION: ICD-10-CM

## 2022-05-10 DIAGNOSIS — Z3A.40 40 WEEKS GESTATION OF PREGNANCY: Primary | ICD-10-CM

## 2022-05-10 LAB
ABO GROUP BLD: NORMAL
BASE EXCESS BLDCOV CALC-SCNC: -1.1 MMOL/L (ref 1–9)
BASOPHILS # BLD AUTO: 0.04 THOUSANDS/ΜL (ref 0–0.1)
BASOPHILS NFR BLD AUTO: 0 % (ref 0–1)
BLD GP AB SCN SERPL QL: NEGATIVE
EOSINOPHIL # BLD AUTO: 0.03 THOUSAND/ΜL (ref 0–0.61)
EOSINOPHIL NFR BLD AUTO: 0 % (ref 0–6)
ERYTHROCYTE [DISTWIDTH] IN BLOOD BY AUTOMATED COUNT: 13.5 % (ref 11.6–15.1)
HCO3 BLDCOV-SCNC: 24.5 MMOL/L (ref 12.2–28.6)
HCT VFR BLD AUTO: 37.2 % (ref 34.8–46.1)
HGB BLD-MCNC: 12.7 G/DL (ref 11.5–15.4)
HOLD SPECIMEN: NORMAL
IMM GRANULOCYTES # BLD AUTO: 0.14 THOUSAND/UL (ref 0–0.2)
IMM GRANULOCYTES NFR BLD AUTO: 1 % (ref 0–2)
LYMPHOCYTES # BLD AUTO: 2.4 THOUSANDS/ΜL (ref 0.6–4.47)
LYMPHOCYTES NFR BLD AUTO: 23 % (ref 14–44)
MCH RBC QN AUTO: 31.1 PG (ref 26.8–34.3)
MCHC RBC AUTO-ENTMCNC: 34.1 G/DL (ref 31.4–37.4)
MCV RBC AUTO: 91 FL (ref 82–98)
MONOCYTES # BLD AUTO: 0.72 THOUSAND/ΜL (ref 0.17–1.22)
MONOCYTES NFR BLD AUTO: 7 % (ref 4–12)
NEUTROPHILS # BLD AUTO: 7.01 THOUSANDS/ΜL (ref 1.85–7.62)
NEUTS SEG NFR BLD AUTO: 69 % (ref 43–75)
NRBC BLD AUTO-RTO: 0 /100 WBCS
OXYHGB MFR BLDCOV: 52 %
PCO2 BLDCOV: 44.3 MM HG (ref 27–43)
PH BLDCOV: 7.36 [PH] (ref 7.19–7.49)
PLATELET # BLD AUTO: 149 THOUSANDS/UL (ref 149–390)
PMV BLD AUTO: 11.3 FL (ref 8.9–12.7)
PO2 BLDCOV: 21.1 MM HG (ref 15–45)
RBC # BLD AUTO: 4.08 MILLION/UL (ref 3.81–5.12)
RH BLD: POSITIVE
SAO2 % BLDCOV: 11.1 ML/DL
SPECIMEN EXPIRATION DATE: NORMAL
WBC # BLD AUTO: 10.34 THOUSAND/UL (ref 4.31–10.16)

## 2022-05-10 PROCEDURE — 86850 RBC ANTIBODY SCREEN: CPT | Performed by: OBSTETRICS & GYNECOLOGY

## 2022-05-10 PROCEDURE — 86900 BLOOD TYPING SEROLOGIC ABO: CPT | Performed by: OBSTETRICS & GYNECOLOGY

## 2022-05-10 PROCEDURE — 82805 BLOOD GASES W/O2 SATURATION: CPT | Performed by: OBSTETRICS & GYNECOLOGY

## 2022-05-10 PROCEDURE — 86592 SYPHILIS TEST NON-TREP QUAL: CPT | Performed by: OBSTETRICS & GYNECOLOGY

## 2022-05-10 PROCEDURE — NC001 PR NO CHARGE: Performed by: OBSTETRICS & GYNECOLOGY

## 2022-05-10 PROCEDURE — 86901 BLOOD TYPING SEROLOGIC RH(D): CPT | Performed by: OBSTETRICS & GYNECOLOGY

## 2022-05-10 PROCEDURE — 59510 CESAREAN DELIVERY: CPT | Performed by: OBSTETRICS & GYNECOLOGY

## 2022-05-10 PROCEDURE — 85025 COMPLETE CBC W/AUTO DIFF WBC: CPT | Performed by: OBSTETRICS & GYNECOLOGY

## 2022-05-10 PROCEDURE — 99214 OFFICE O/P EST MOD 30 MIN: CPT

## 2022-05-10 RX ORDER — ONDANSETRON 2 MG/ML
4 INJECTION INTRAMUSCULAR; INTRAVENOUS EVERY 4 HOURS PRN
Status: ACTIVE | OUTPATIENT
Start: 2022-05-10 | End: 2022-05-11

## 2022-05-10 RX ORDER — BUPIVACAINE HYDROCHLORIDE 7.5 MG/ML
INJECTION, SOLUTION INTRASPINAL AS NEEDED
Status: DISCONTINUED | OUTPATIENT
Start: 2022-05-10 | End: 2022-05-10

## 2022-05-10 RX ORDER — SODIUM CHLORIDE, SODIUM LACTATE, POTASSIUM CHLORIDE, CALCIUM CHLORIDE 600; 310; 30; 20 MG/100ML; MG/100ML; MG/100ML; MG/100ML
125 INJECTION, SOLUTION INTRAVENOUS CONTINUOUS
Status: DISCONTINUED | OUTPATIENT
Start: 2022-05-10 | End: 2022-05-12 | Stop reason: HOSPADM

## 2022-05-10 RX ORDER — DEXAMETHASONE SODIUM PHOSPHATE 10 MG/ML
INJECTION, SOLUTION INTRAMUSCULAR; INTRAVENOUS AS NEEDED
Status: DISCONTINUED | OUTPATIENT
Start: 2022-05-10 | End: 2022-05-10

## 2022-05-10 RX ORDER — OXYCODONE HYDROCHLORIDE 5 MG/1
5 TABLET ORAL EVERY 4 HOURS PRN
Status: CANCELLED | OUTPATIENT
Start: 2022-05-10

## 2022-05-10 RX ORDER — MORPHINE SULFATE 1 MG/ML
INJECTION, SOLUTION EPIDURAL; INTRATHECAL; INTRAVENOUS AS NEEDED
Status: DISCONTINUED | OUTPATIENT
Start: 2022-05-10 | End: 2022-05-10

## 2022-05-10 RX ORDER — MEPERIDINE HYDROCHLORIDE 25 MG/ML
25 INJECTION INTRAMUSCULAR; INTRAVENOUS; SUBCUTANEOUS ONCE AS NEEDED
Status: DISCONTINUED | OUTPATIENT
Start: 2022-05-10 | End: 2022-05-12 | Stop reason: HOSPADM

## 2022-05-10 RX ORDER — KETOROLAC TROMETHAMINE 30 MG/ML
30 INJECTION, SOLUTION INTRAMUSCULAR; INTRAVENOUS EVERY 6 HOURS PRN
Status: DISPENSED | OUTPATIENT
Start: 2022-05-10 | End: 2022-05-11

## 2022-05-10 RX ORDER — KETOROLAC TROMETHAMINE 30 MG/ML
INJECTION, SOLUTION INTRAMUSCULAR; INTRAVENOUS AS NEEDED
Status: DISCONTINUED | OUTPATIENT
Start: 2022-05-10 | End: 2022-05-10

## 2022-05-10 RX ORDER — ONDANSETRON 2 MG/ML
INJECTION INTRAMUSCULAR; INTRAVENOUS AS NEEDED
Status: DISCONTINUED | OUTPATIENT
Start: 2022-05-10 | End: 2022-05-10

## 2022-05-10 RX ORDER — FENTANYL CITRATE 50 UG/ML
INJECTION, SOLUTION INTRAMUSCULAR; INTRAVENOUS AS NEEDED
Status: DISCONTINUED | OUTPATIENT
Start: 2022-05-10 | End: 2022-05-10

## 2022-05-10 RX ORDER — CEFAZOLIN SODIUM 2 G/50ML
2000 SOLUTION INTRAVENOUS ONCE
Status: COMPLETED | OUTPATIENT
Start: 2022-05-10 | End: 2022-05-10

## 2022-05-10 RX ORDER — NALBUPHINE HCL 10 MG/ML
5 AMPUL (ML) INJECTION
Status: ACTIVE | OUTPATIENT
Start: 2022-05-10 | End: 2022-05-11

## 2022-05-10 RX ORDER — LIDOCAINE HYDROCHLORIDE 10 MG/ML
INJECTION, SOLUTION EPIDURAL; INFILTRATION; INTRACAUDAL; PERINEURAL AS NEEDED
Status: DISCONTINUED | OUTPATIENT
Start: 2022-05-10 | End: 2022-05-10

## 2022-05-10 RX ORDER — OXYTOCIN/RINGER'S LACTATE 30/500 ML
62.5 PLASTIC BAG, INJECTION (ML) INTRAVENOUS ONCE
Status: COMPLETED | OUTPATIENT
Start: 2022-05-10 | End: 2022-05-11

## 2022-05-10 RX ORDER — OXYCODONE HYDROCHLORIDE 5 MG/1
10 TABLET ORAL EVERY 4 HOURS PRN
Status: CANCELLED | OUTPATIENT
Start: 2022-05-10

## 2022-05-10 RX ORDER — OXYTOCIN/RINGER'S LACTATE 30/500 ML
PLASTIC BAG, INJECTION (ML) INTRAVENOUS CONTINUOUS PRN
Status: DISCONTINUED | OUTPATIENT
Start: 2022-05-10 | End: 2022-05-10

## 2022-05-10 RX ORDER — METOCLOPRAMIDE HYDROCHLORIDE 5 MG/ML
5 INJECTION INTRAMUSCULAR; INTRAVENOUS EVERY 6 HOURS PRN
Status: ACTIVE | OUTPATIENT
Start: 2022-05-10 | End: 2022-05-11

## 2022-05-10 RX ORDER — ONDANSETRON 2 MG/ML
4 INJECTION INTRAMUSCULAR; INTRAVENOUS EVERY 8 HOURS PRN
Status: DISCONTINUED | OUTPATIENT
Start: 2022-05-10 | End: 2022-05-10

## 2022-05-10 RX ORDER — DEXAMETHASONE SODIUM PHOSPHATE 10 MG/ML
8 INJECTION, SOLUTION INTRAMUSCULAR; INTRAVENOUS ONCE AS NEEDED
Status: ACTIVE | OUTPATIENT
Start: 2022-05-10 | End: 2022-05-11

## 2022-05-10 RX ORDER — NALOXONE HYDROCHLORIDE 0.4 MG/ML
0.1 INJECTION, SOLUTION INTRAMUSCULAR; INTRAVENOUS; SUBCUTANEOUS
Status: ACTIVE | OUTPATIENT
Start: 2022-05-10 | End: 2022-05-11

## 2022-05-10 RX ORDER — FENTANYL CITRATE/PF 50 MCG/ML
25 SYRINGE (ML) INJECTION
Status: DISCONTINUED | OUTPATIENT
Start: 2022-05-10 | End: 2022-05-12 | Stop reason: HOSPADM

## 2022-05-10 RX ORDER — SODIUM CHLORIDE, SODIUM LACTATE, POTASSIUM CHLORIDE, CALCIUM CHLORIDE 600; 310; 30; 20 MG/100ML; MG/100ML; MG/100ML; MG/100ML
125 INJECTION, SOLUTION INTRAVENOUS CONTINUOUS
Status: DISCONTINUED | OUTPATIENT
Start: 2022-05-10 | End: 2022-05-10

## 2022-05-10 RX ORDER — OXYCODONE HYDROCHLORIDE AND ACETAMINOPHEN 5; 325 MG/1; MG/1
1 TABLET ORAL EVERY 4 HOURS PRN
Status: ACTIVE | OUTPATIENT
Start: 2022-05-10 | End: 2022-05-11

## 2022-05-10 RX ORDER — ACETAMINOPHEN 325 MG/1
650 TABLET ORAL EVERY 6 HOURS SCHEDULED
Status: DISCONTINUED | OUTPATIENT
Start: 2022-05-11 | End: 2022-05-12 | Stop reason: HOSPADM

## 2022-05-10 RX ADMIN — SODIUM CHLORIDE, SODIUM LACTATE, POTASSIUM CHLORIDE, AND CALCIUM CHLORIDE 125 ML/HR: .6; .31; .03; .02 INJECTION, SOLUTION INTRAVENOUS at 20:17

## 2022-05-10 RX ADMIN — SODIUM CHLORIDE, SODIUM LACTATE, POTASSIUM CHLORIDE, AND CALCIUM CHLORIDE: .6; .31; .03; .02 INJECTION, SOLUTION INTRAVENOUS at 20:56

## 2022-05-10 RX ADMIN — PHENYLEPHRINE HYDROCHLORIDE 50 MCG/MIN: 10 INJECTION INTRAVENOUS at 21:03

## 2022-05-10 RX ADMIN — FENTANYL CITRATE 15 MCG: 50 INJECTION, SOLUTION INTRAMUSCULAR; INTRAVENOUS at 21:02

## 2022-05-10 RX ADMIN — MORPHINE SULFATE 0.15 MG: 1 INJECTION, SOLUTION EPIDURAL; INTRATHECAL; INTRAVENOUS at 21:02

## 2022-05-10 RX ADMIN — ONDANSETRON 4 MG: 2 INJECTION INTRAMUSCULAR; INTRAVENOUS at 21:04

## 2022-05-10 RX ADMIN — BUPIVACAINE HYDROCHLORIDE IN DEXTROSE 1.7 ML: 7.5 INJECTION, SOLUTION SUBARACHNOID at 21:02

## 2022-05-10 RX ADMIN — DEXAMETHASONE SODIUM PHOSPHATE 10 MG: 10 INJECTION, SOLUTION INTRAMUSCULAR; INTRAVENOUS at 21:08

## 2022-05-10 RX ADMIN — SODIUM CHLORIDE, SODIUM LACTATE, POTASSIUM CHLORIDE, AND CALCIUM CHLORIDE: .6; .31; .03; .02 INJECTION, SOLUTION INTRAVENOUS at 22:00

## 2022-05-10 RX ADMIN — CEFAZOLIN SODIUM 2000 MG: 2 SOLUTION INTRAVENOUS at 20:51

## 2022-05-10 RX ADMIN — SODIUM CHLORIDE, SODIUM LACTATE, POTASSIUM CHLORIDE, AND CALCIUM CHLORIDE 1000 ML: .6; .31; .03; .02 INJECTION, SOLUTION INTRAVENOUS at 18:50

## 2022-05-10 RX ADMIN — Medication 62.5 MILLI-UNITS/MIN: at 22:46

## 2022-05-10 RX ADMIN — KETOROLAC TROMETHAMINE 30 MG: 30 INJECTION, SOLUTION INTRAMUSCULAR at 21:53

## 2022-05-10 RX ADMIN — LIDOCAINE HYDROCHLORIDE 3 ML: 10 INJECTION, SOLUTION EPIDURAL; INFILTRATION; INTRACAUDAL; PERINEURAL at 21:01

## 2022-05-10 RX ADMIN — Medication 250 MILLI-UNITS/MIN: at 21:21

## 2022-05-10 NOTE — ANESTHESIA PREPROCEDURE EVALUATION
Procedure:   SECTION () (N/A Uterus)    Relevant Problems   ANESTHESIA (within normal limits)      CARDIO (within normal limits)      ENDO (within normal limits)      GYN   (+) 40 weeks gestation of pregnancy   (+) Encounter for supervision of normal pregnancy in third trimester      NEURO/PSYCH   (+) History of macrosomia in infant in prior pregnancy, currently pregnant      PULMONARY (within normal limits)      Other   (+) Breech presentation        Physical Exam    Airway    Mallampati score: I  TM Distance: >3 FB  Neck ROM: full     Dental   No notable dental hx     Cardiovascular      Pulmonary      Other Findings        Anesthesia Plan  ASA Score- 2 Emergent    Anesthesia Type- spinal with ASA Monitors  Additional Monitors:   Airway Plan:     Comment: Spinal with Duramorph  Pt at 14:00 but is in labor: Emergent  Plan Factors-Exercise tolerance (METS): >4 METS  Chart reviewed  Existing labs reviewed  Patient summary reviewed  Patient is not a current smoker  Induction-     Postoperative Plan-     Informed Consent- Anesthetic plan and risks discussed with patient  I personally reviewed this patient with the CRNA  Discussed and agreed on the Anesthesia Plan with the CRNA  Dori Childress

## 2022-05-10 NOTE — TELEPHONE ENCOUNTER
Pt called and she is 40w3d  she said she believes lost mucus plug earlier today  she is having contractions 5 min apart, shorter than a minute about 50 sec she said  no VB  no LOF, told her I would reach out to on call provider CT  Per CT:    How long has she been having the contractions that close together? What number baby? Pt informed me these have been going on for an hour  She is a   Per CT, pt can come to triage  Pt informed she can head over to triage, said she can get there in 30-35 min  L&D notified as well

## 2022-05-10 NOTE — H&P
H&P Exam - Obstetrics   Pradhanroma Victoriaorenzo 36 y o  female MRN: [id number]  Unit/Bed#: LD TRIAGE 3 Encounter: [id number]      ASSESSMENT:  36yo  at 40w3d weeks gestation who is being admitted for a 1LTCS in the setting of breech presentation  EFW: 7 5lbs  Breech by transabdominal ultrasound    PLAN:    Pregnancy at 40w3d  Admit  Follow up CBC, RPR, Blood Type  Method of contraception: undecided  GBS negative status   Analgesia and/or epidural at patient request  Proceed to OR for 1LTCS    Discussed with Dr Ayesha Mata      This patient will be an INPATIENT  and I certify the anticipated length of stay is >2 Midnights  History of Present Illness     Chief Complaint: Contractions    HPI:  Colleen Weinberg is a 36 y o   female with an ANIA of 2022, by Ultrasound at 40w3d weeks gestation who is being admitted for primary low transverse  section in the setting of early labor and breech presentation  Patient stated that she had fell contractions starting at 3:00 p m  which has subsequently gotten worse  She denies leakage of fluid, vaginal bleeding, decreased fetal movement  Patient states that she lost her mucus plug earlier this morning  Contractions:  Yes  Loss of fluid:  No  Vaginal bleeding:  No  Fetal movement:  yes    She is SLOGA patient       PREGNANCY COMPLICATIONS:   Patient Active Problem List   Diagnosis    Encounter for supervision of normal pregnancy in third trimester    History of macrosomia in infant in prior pregnancy, currently pregnant    Abnormal glucose affecting pregnancy    40 weeks gestation of pregnancy    Breech presentation       OB History    Para Term  AB Living   3 1 1 0 1 1   SAB IAB Ectopic Multiple Live Births   1 0 0 0 1      # Outcome Date GA Lbr Nish/2nd Weight Sex Delivery Anes PTL Lv   3 Current            2 Term 20 41w1d / 00:57 4160 g (9 lb 2 7 oz) M Vag-Spont EPI  DINAH   1 SAB 2018 7w0d              Baby complications/comments: none  breech    Review of Systems   Constitutional: Negative for chills and fever  HENT: Negative for ear pain and sore throat  Eyes: Negative for pain and visual disturbance  Respiratory: Negative for cough and shortness of breath  Cardiovascular: Negative for chest pain and palpitations  Gastrointestinal: Positive for abdominal pain  Negative for vomiting  Genitourinary: Negative for dysuria and hematuria  Musculoskeletal: Negative for arthralgias and back pain  Skin: Negative for color change and rash  Neurological: Negative for seizures and syncope  All other systems reviewed and are negative  Historical Information   Past Medical History:   Diagnosis Date    Irregular menses     27-50 days     Miscarriage 2018    Varicella     as a child     Visual impairment      Past Surgical History:   Procedure Laterality Date    FINGER SURGERY Right 06/2008    Ring Finger    WISDOM TOOTH EXTRACTION  2010     Social History   Social History     Substance and Sexual Activity   Alcohol Use Not Currently    Comment: none with pregnancy     Social History     Substance and Sexual Activity   Drug Use Never    Comment: fob-marijuana, pt's brother - marijuana     Social History     Tobacco Use   Smoking Status Never Smoker   Smokeless Tobacco Never Used     Family History: non-contributory    Meds/Allergies      Medications Prior to Admission   Medication    Cholecalciferol 25 MCG (1000 UT) tablet    Prenatal MV-Min-Fe Fum-FA-DHA (PRENATAL+DHA PO)      No Known Allergies    OBJECTIVE:    Vitals: Blood pressure 133/79, pulse 97, currently breastfeeding  There is no height or weight on file to calculate BMI  Physical Exam  Vitals reviewed  Constitutional:       Appearance: Normal appearance  HENT:      Head: Normocephalic and atraumatic  Eyes:      Extraocular Movements: Extraocular movements intact  Cardiovascular:      Rate and Rhythm: Normal rate        Pulses: Normal pulses  Pulmonary:      Effort: Pulmonary effort is normal       Breath sounds: Normal breath sounds  Abdominal:      Palpations: Abdomen is soft  Tenderness: There is no abdominal tenderness  Comments: Gravid uterus   Musculoskeletal:         General: Normal range of motion  Cervical back: Normal range of motion  Skin:     General: Skin is warm and dry  Neurological:      Mental Status: She is alert  Psychiatric:         Mood and Affect: Mood normal          Behavior: Behavior normal          Cervix:  Cervical Dilation: 3  Cervical Effacement: 70  Cervical Consistency: Medium  Fetal Station: -3  Method: Manual  OB Examiner: christie    Fetal heart rate:   Baseline Rate: 135 bpm  Variability: Moderate 6-25 bpm  Accelerations: 15 x 15 or greater  Decelerations: None    [place]:   Contraction Frequency (minutes): irregular    GBS: negative    Prenatal Labs: I have personally reviewed pertinent reports    , Blood Type:   Lab Results   Component Value Date/Time    ABO Grouping A 10/21/2021 11:01 AM     , D (Rh type):   Lab Results   Component Value Date/Time    Rh Factor Positive 10/21/2021 11:01 AM     , Antibody Screen: No results found for: ANTIBODYSCR , HCT/HGB:   Lab Results   Component Value Date/Time    Hematocrit 37 2 05/10/2022 06:44 PM    Hemoglobin 12 7 05/10/2022 06:44 PM      , MCV:   Lab Results   Component Value Date/Time    MCV 91 05/10/2022 06:44 PM      , Platelets:   Lab Results   Component Value Date/Time    Platelets 876 11/96/7130 06:44 PM      , 1 hour Glucola:   Lab Results   Component Value Date/Time    Glucose 165 (H) 12/03/2021 10:36 AM   , 3 hour GTT:   Lab Results   Component Value Date/Time    Glucose, GTT - 3 Hour 111 02/10/2022 10:29 AM   , Varicella: No results found for: VARICELLAIGG    , Rubella:   Lab Results   Component Value Date/Time    Rubella IgG Quant >175 0 10/21/2021 11:01 AM        , VDRL/RPR:   Lab Results   Component Value Date/Time    RPR Non-Reactive 02/16/2022 06:49 AM      , Urine Culture/Screen:   Lab Results   Component Value Date/Time    Urine Culture <10,000 cfu/ml  10/21/2021 11:01 AM       , Urine Drug Screen: No results found for: AMPHETUR, BARBTUR, BDZUR, THCUR, COCAINEUR, METHADONEUR, OPIATEUR, PCPUR, MTHAMUR, ECSTASYUR, TRICYCLICSUR, Hep B:   Lab Results   Component Value Date/Time    Hepatitis B Surface Ag Non-reactive 10/21/2021 11:01 AM     , Hep C: No components found for: HEPCSAG, EXTHEPCSAG   , HIV:   Lab Results   Component Value Date/Time    HIV-1/HIV-2 Ab Non-Reactive 10/21/2021 11:01 AM     , Chlamydia: No results found for: EXTCHLAMYDIA  , Gonorrhea:   Lab Results   Component Value Date/Time    N gonorrhoeae, DNA Probe Negative 10/25/2021 04:11 PM     , Group B Strep:    Lab Results   Component Value Date/Time    Strep Grp B PCR Negative 04/14/2022 03:47 PM    Strep Grp B PCR Negative for Beta Hemolytic Strep Grp B by PCR 04/23/2020 04:07 PM          Invasive Devices  Report    Peripheral Intravenous Line            Peripheral IV 05/10/22 Distal;Left;Ventral (anterior) Forearm <1 day                    Ayala France MD  5/10/2022  7:00 PM

## 2022-05-10 NOTE — PLAN OF CARE
Problem: PAIN - ADULT  Goal: Verbalizes/displays adequate comfort level or baseline comfort level  Description: Interventions:  - Encourage patient to monitor pain and request assistance  - Assess pain using appropriate pain scale  - Administer analgesics based on type and severity of pain and evaluate response  - Implement non-pharmacological measures as appropriate and evaluate response  - Consider cultural and social influences on pain and pain management  - Notify physician/advanced practitioner if interventions unsuccessful or patient reports new pain  Outcome: Progressing     Problem: INFECTION - ADULT  Goal: Absence or prevention of progression during hospitalization  Description: INTERVENTIONS:  - Assess and monitor for signs and symptoms of infection  - Monitor lab/diagnostic results  - Monitor all insertion sites, i e  indwelling lines, tubes, and drains  - Monitor endotracheal if appropriate and nasal secretions for changes in amount and color  - [place] appropriate cooling/warming therapies per order  - Administer medications as ordered  - Instruct and encourage patient and family to use good hand hygiene technique  - Identify and instruct in appropriate isolation precautions for identified infection/condition  Outcome: Progressing  Goal: Absence of fever/infection during neutropenic period  Description: INTERVENTIONS:  - Monitor WBC    Outcome: Progressing     Problem: SAFETY ADULT  Goal: Patient will remain free of falls  Description: INTERVENTIONS:  - Educate patient/family on patient safety including physical limitations  - Instruct patient to call for assistance with activity   - Consult OT/PT to assist with strengthening/mobility   - Keep Call bell within reach  - Keep bed low and locked with side rails adjusted as appropriate  - Keep care items and personal belongings within reach  - Initiate and maintain comfort rounds  - Make Fall Risk Sign visible to staff  - Offer Toileting every  Hours, in advance of need  - Initiate/Maintain alarm  - Obtain necessary fall risk management equipment:   - Apply yellow socks and bracelet for high fall risk patients  - Consider moving patient to room near nurses station  Outcome: Progressing  Goal: Maintain or return to baseline ADL function  Description: INTERVENTIONS:  -  Assess patient's ability to carry out ADLs; assess patient's baseline for ADL function and identify physical deficits which impact ability to perform ADLs (bathing, care of mouth/teeth, toileting, grooming, dressing, etc )  - Assess/evaluate cause of self-care deficits   - Assess range of motion  - Assess patient's mobility; develop plan if impaired  - Assess patient's need for assistive devices and provide as appropriate  - Encourage maximum independence but intervene and supervise when necessary  - Involve family in performance of ADLs  - Assess for home care needs following discharge   - Consider OT consult to assist with ADL evaluation and planning for discharge  - Provide patient education as appropriate  Outcome: Progressing  Goal: Maintains/Returns to pre admission functional level  Description: INTERVENTIONS:  - Perform BMAT or MOVE assessment daily    - Set and communicate daily mobility goal to care team and patient/family/caregiver  - Collaborate with rehabilitation services on mobility goals if consulted  - Perform Range of Motion  times a day  - Reposition patient every  hours    - Dangle patient  times a day  - Stand patient  times a day  - Ambulate patient  times a day  - Out of bed to chair  times a day   - Out of bed for meals  times a day  - Out of bed for toileting  - Record patient progress and toleration of activity level   Outcome: Progressing     Problem: Knowledge Deficit  Goal: Patient/family/caregiver demonstrates understanding of disease process, treatment plan, medications, and discharge instructions  Description: Complete learning assessment and assess knowledge base   Interventions:  - Provide teaching at level of understanding  - Provide teaching via preferred learning methods  Outcome: Progressing     Problem: DISCHARGE PLANNING  Goal: Discharge to home or other facility with appropriate resources  Description: INTERVENTIONS:  - Identify barriers to discharge w/patient and caregiver  - Arrange for needed discharge resources and transportation as appropriate  - Identify discharge learning needs (meds, wound care, etc )  - Arrange for interpretive services to assist at discharge as needed  - Refer to Case Management Department for coordinating discharge planning if the patient needs post-hospital services based on physician/advanced practitioner order or complex needs related to functional status, cognitive ability, or social support system  Outcome: Progressing     Problem: BIRTH - VAGINAL/ SECTION  Goal: Fetal and maternal status remain reassuring during the birth process  Description: INTERVENTIONS:  - Monitor vital signs  - Monitor fetal heart rate  - Monitor uterine activity  - Monitor labor progression (vaginal delivery)  - DVT prophylaxis  - Antibiotic prophylaxis  Outcome: Progressing  Goal: Emotionally satisfying birthing experience for mother/fetus  Description: Interventions:  - Assess, plan, implement and evaluate the nursing care given to the patient in labor  - Advocate the philosophy that each childbirth experience is a unique experience and support the family's chosen level of involvement and control during the labor process   - Actively participate in both the patient's and family's teaching of the birth process  - Consider cultural, Mu-ism and age-specific factors and plan care for the patient in labor  Outcome: Progressing

## 2022-05-11 PROBLEM — Z98.891 S/P CESAREAN SECTION: Status: ACTIVE | Noted: 2021-12-21

## 2022-05-11 LAB
ERYTHROCYTE [DISTWIDTH] IN BLOOD BY AUTOMATED COUNT: 13.4 % (ref 11.6–15.1)
HCT VFR BLD AUTO: 30.5 % (ref 34.8–46.1)
HGB BLD-MCNC: 10.3 G/DL (ref 11.5–15.4)
MCH RBC QN AUTO: 31.4 PG (ref 26.8–34.3)
MCHC RBC AUTO-ENTMCNC: 33.8 G/DL (ref 31.4–37.4)
MCV RBC AUTO: 93 FL (ref 82–98)
PLATELET # BLD AUTO: 140 THOUSANDS/UL (ref 149–390)
PMV BLD AUTO: 11.3 FL (ref 8.9–12.7)
RBC # BLD AUTO: 3.28 MILLION/UL (ref 3.81–5.12)
RPR SER QL: NORMAL
WBC # BLD AUTO: 13.85 THOUSAND/UL (ref 4.31–10.16)

## 2022-05-11 PROCEDURE — 99024 POSTOP FOLLOW-UP VISIT: CPT | Performed by: OBSTETRICS & GYNECOLOGY

## 2022-05-11 PROCEDURE — 85027 COMPLETE CBC AUTOMATED: CPT

## 2022-05-11 RX ORDER — ONDANSETRON 2 MG/ML
4 INJECTION INTRAMUSCULAR; INTRAVENOUS EVERY 8 HOURS PRN
Status: DISCONTINUED | OUTPATIENT
Start: 2022-05-11 | End: 2022-05-12 | Stop reason: HOSPADM

## 2022-05-11 RX ORDER — DIPHENHYDRAMINE HCL 25 MG
25 TABLET ORAL EVERY 6 HOURS PRN
Status: DISCONTINUED | OUTPATIENT
Start: 2022-05-11 | End: 2022-05-12 | Stop reason: HOSPADM

## 2022-05-11 RX ORDER — CALCIUM CARBONATE 200(500)MG
1000 TABLET,CHEWABLE ORAL DAILY PRN
Status: DISCONTINUED | OUTPATIENT
Start: 2022-05-11 | End: 2022-05-12 | Stop reason: HOSPADM

## 2022-05-11 RX ORDER — SIMETHICONE 80 MG
80 TABLET,CHEWABLE ORAL 4 TIMES DAILY PRN
Status: DISCONTINUED | OUTPATIENT
Start: 2022-05-11 | End: 2022-05-12 | Stop reason: HOSPADM

## 2022-05-11 RX ORDER — DOCUSATE SODIUM 100 MG/1
100 CAPSULE, LIQUID FILLED ORAL 2 TIMES DAILY
Status: DISCONTINUED | OUTPATIENT
Start: 2022-05-11 | End: 2022-05-12 | Stop reason: HOSPADM

## 2022-05-11 RX ADMIN — KETOROLAC TROMETHAMINE 30 MG: 30 INJECTION, SOLUTION INTRAMUSCULAR at 20:50

## 2022-05-11 RX ADMIN — DOCUSATE SODIUM 100 MG: 100 CAPSULE, LIQUID FILLED ORAL at 09:41

## 2022-05-11 RX ADMIN — SODIUM CHLORIDE, SODIUM LACTATE, POTASSIUM CHLORIDE, AND CALCIUM CHLORIDE 125 ML/HR: .6; .31; .03; .02 INJECTION, SOLUTION INTRAVENOUS at 05:00

## 2022-05-11 RX ADMIN — ACETAMINOPHEN 650 MG: 325 TABLET, FILM COATED ORAL at 09:41

## 2022-05-11 RX ADMIN — ACETAMINOPHEN 650 MG: 325 TABLET, FILM COATED ORAL at 17:22

## 2022-05-11 RX ADMIN — DOCUSATE SODIUM 100 MG: 100 CAPSULE, LIQUID FILLED ORAL at 17:22

## 2022-05-11 RX ADMIN — SIMETHICONE 80 MG: 80 TABLET, CHEWABLE ORAL at 20:49

## 2022-05-11 NOTE — ANESTHESIA POSTPROCEDURE EVALUATION
Post-Op Assessment Note    CV Status:  Stable  Pain Score: 0    Pain management: adequate     Mental Status:  Alert and awake   Hydration Status:  Stable   PONV Controlled:  None   Airway Patency:  Patent      Post Op Vitals Reviewed: Yes      Staff: CRNA         No complications documented  BP   97/55   Temp   97 4   Pulse  105   Resp   15   SpO2   96% on RA   Postop VS in PACU noted above, SV non-obstructed  Denies pain, unable to move LE yet

## 2022-05-11 NOTE — OP NOTE
Section Procedure Note    Indications:   Breech presentation, labor    Pre-operative Diagnosis:   Patient Active Problem List   Diagnosis    Encounter for supervision of normal pregnancy in third trimester    History of macrosomia in infant in prior pregnancy, currently pregnant    Abnormal glucose affecting pregnancy    40 weeks gestation of pregnancy    Breech presentation       Post-operative Diagnosis:   1LTCS  Patient Active Problem List   Diagnosis    Encounter for supervision of normal pregnancy in third trimester    History of macrosomia in infant in prior pregnancy, currently pregnant    Abnormal glucose affecting pregnancy    40 weeks gestation of pregnancy    Breech presentation       Attending: Noreen Hardy MD  Resident: Ebony Bassett MD    Maternal Findings:  Normal uterus  Normal tubes and ovaries bilaterally  No adhesions     Findings:  Viable male weighing 8lbs 11oz; Apgar scores of 8 at one minute and 9 at five minutes  Clear amniotic fluid  Normal placenta with 3-vessel cord inserted centrally    Arterial and Venous Gases:  Umbilical Cord Venous Blood Gas:  Results from last 7 days   Lab Units 05/10/22  2120   PH COV  7 361   PCO2 COV mm HG 44 3*   HCO3 COV mmol/L 24 5   BASE EXC COV mmol/L -1 1*   O2 CT CD VB mL/dL 11 1   O2 HGB, VENOUS CORD % 18 3     Umbilical Cord Arterial Blood Gas:        Specimens: Arterial and venous cord gases, cord blood, segment of umbilical cord, placenta to storage    Quantitative Blood Loss: 551 mL    Drains: Potts catheter           Complications:  None; patient tolerated the procedure well  Disposition: PACU            Condition: stable    Brief Labor Course:   Patient was admitted for labor and found to be breech presentation with fetal head located in right upper quadrant  In triage, her cervical exam was 3/70/-3 and no fetal head was palpated   The decision was made to proceed to the OR for a 1LTCS for breech presentation in the setting of early labor  Procedure Details   The patient was seen prior to the procedure  Risks, benefits, possible complications, alternate treatment options, and expected outcomes were discussed with the patient  The patient agreed with the proposed plan and gave informed consent for a 1LTCS  The patient was taken to the [facility] Operating Room where she received spinal anesthesia  For infection prophylaxis, she received Ancef preoperatively  Fetal heart tones in the OR were assessed and noted to be within normal limits and a Potts catheter and SCDs were placed  The abdomen and vagina was prepped with Chloraprep and following appropriate drying time, the patient was draped in the usual sterile manner  A Time Out was held and the above information confirmed  The patient was identified as Chapincito Vasquez and the procedure verified as a  Delivery for breech presentation  A Pfannenstiel incision was made and carried down through the underlying subcutaneous tissue to the fascia using a scalpel  The rectus fascia was then nicked in the midline and dissected laterally using Haas scissors  The superior edge of the  fascial incision was grasped with Kocher clamps bilaterally, tented upward and the underlying rectus muscles were dissected off sharply with Haas scissors  This was repeated on the inferior edge of the fascia and dissected down to the pubic rami  The rectus muscles were  and the peritoneum was identified, entered, and extended longitudinally with blunt dissection  The bladder blade was inserted  A low transverse uterine incision was made with the scalpel and extended laterally with blunt dissection  The amnion was entered bluntly  It was noted at this time that fetus had reverted to vertex position  The fetal head was palpated, elevated, and delivered through the uterine incision followed by the body without difficulty   There was noted to be spontaneous cry and good tone  There was a small abrasion to the right side of fetal scalp  The umbilical cord was doubly clamped and cut after 30 seconds to allow for delayed cord clamping  The infant was handed off to the  providers  Arterial and venous cord gases, cord blood, and a segment of umbilical cord were obtained for evaluation  The placenta delivered spontaneously with uterine fundal massage and appeared normal  The uterus was exteriorized and cleaned out with a moist lap sponge  The uterine incision was closed with a running locked suture of 0 Vicryl  An additional running locked suture of 0 Vicryl was used to close the right side of uterine incision  A second layer of the same suture was used to imbricate the first  A figure of eight of 0 Vicryl was placed over the right side of the uterine incision for an area of oozing  Hemostasis was noted to be excellent  Normal saline solution was used to irrigate the posterior culdesac  The uterus was returned to the abdomen  The paracolic gutters were inspected and cleared of all clots and debris with irrigation and moist lap sponges  The fascia was closed with a running suture of 0 Vicryl  Subcutaneous adipose tissue was closed with a running suture of 2-0 Plain gut  The skin was closed with a subcuticular running suture of 4-0 Monocryl  The patient appeared to tolerate the procedure very well  Lap sponge, needle, and instrument counts were correct x2  The patient was transferred to her postpartum recovery room in stable condition and her infant went to the  nursery  Attending Attestation: Dr Paris Short MD was present for the entire procedure  Kanika Marques MD  OB/GYN PGY-1  5/10/2022  10:16 PM

## 2022-05-11 NOTE — PLAN OF CARE
Problem: PAIN - ADULT  Goal: Verbalizes/displays adequate comfort level or baseline comfort level  Description: Interventions:  - Encourage patient to monitor pain and request assistance  - Assess pain using appropriate pain scale  - Administer analgesics based on type and severity of pain and evaluate response  - Implement non-pharmacological measures as appropriate and evaluate response  - Consider cultural and social influences on pain and pain management  - Notify physician/advanced practitioner if interventions unsuccessful or patient reports new pain  5/10/2022 2139 by Bret Kelsey RN  Outcome: Progressing  5/10/2022 2001 by Bret Kelsey RN  Outcome: Progressing     Problem: INFECTION - ADULT  Goal: Absence or prevention of progression during hospitalization  Description: INTERVENTIONS:  - Assess and monitor for signs and symptoms of infection  - Monitor lab/diagnostic results  - Monitor all insertion sites, i e  indwelling lines, tubes, and drains  - Monitor endotracheal if appropriate and nasal secretions for changes in amount and color  - [place] appropriate cooling/warming therapies per order  - Administer medications as ordered  - Instruct and encourage patient and family to use good hand hygiene technique  - Identify and instruct in appropriate isolation precautions for identified infection/condition  5/10/2022 2139 by Bret Kelsey RN  Outcome: Progressing  5/10/2022 2001 by Bret Kelsey RN  Outcome: Progressing  Goal: Absence of fever/infection during neutropenic period  Description: INTERVENTIONS:  - Monitor WBC    5/10/2022 2139 by Bret Kelsey RN  Outcome: Progressing  5/10/2022 2001 by Bret Kelsey RN  Outcome: Progressing     Problem: SAFETY ADULT  Goal: Patient will remain free of falls  Description: INTERVENTIONS:  - Educate patient/family on patient safety including physical limitations  - Instruct patient to call for assistance with activity   - Consult OT/PT to assist with strengthening/mobility   - Keep Call bell within reach  - Keep bed low and locked with side rails adjusted as appropriate  - Keep care items and personal belongings within reach  - Initiate and maintain comfort rounds  - Make Fall Risk Sign visible to staff    - Apply yellow socks and bracelet for high fall risk patients  - Consider moving patient to room near nurses station  5/10/2022 2139 by Amanda Tomas RN  Outcome: Progressing  5/10/2022 2001 by Amanda Tomas RN  Outcome: Progressing  Goal: Maintain or return to baseline ADL function  Description: INTERVENTIONS:  -  Assess patient's ability to carry out ADLs; assess patient's baseline for ADL function and identify physical deficits which impact ability to perform ADLs (bathing, care of mouth/teeth, toileting, grooming, dressing, etc )  - Assess/evaluate cause of self-care deficits   - Assess range of motion  - Assess patient's mobility; develop plan if impaired  - Assess patient's need for assistive devices and provide as appropriate  - Encourage maximum independence but intervene and supervise when necessary  - Involve family in performance of ADLs  - Assess for home care needs following discharge   - Consider OT consult to assist with ADL evaluation and planning for discharge  - Provide patient education as appropriate  5/10/2022 2139 by Amanda Tomas RN  Outcome: Progressing  5/10/2022 2001 by Amanda Tomas RN  Outcome: Progressing  Goal: Maintains/Returns to pre admission functional level  Description: INTERVENTIONS:  - Perform BMAT or MOVE assessment daily    - Set and communicate daily mobility goal to care team and patient/family/caregiver     - Collaborate with rehabilitation services on mobility goals if consulted  - Out of bed for toileting  - Record patient progress and toleration of activity level   5/10/2022 2139 by Amanda Tomas RN  Outcome: Progressing  5/10/2022 2001 by Amanda Tomas RN  Outcome: Progressing     Problem: Knowledge Deficit  Goal: Patient/family/caregiver demonstrates understanding of disease process, treatment plan, medications, and discharge instructions  Description: Complete learning assessment and assess knowledge base    Interventions:  - Provide teaching at level of understanding  - Provide teaching via preferred learning methods  5/10/2022 2139 by Fransisco Mccracken RN  Outcome: Progressing  5/10/2022 2001 by Fransisco Mccracken RN  Outcome: Progressing     Problem: DISCHARGE PLANNING  Goal: Discharge to home or other facility with appropriate resources  Description: INTERVENTIONS:  - Identify barriers to discharge w/patient and caregiver  - Arrange for needed discharge resources and transportation as appropriate  - Identify discharge learning needs     - Refer to Case Management Department for coordinating discharge planning if the patient needs post-hospital services based on physician/advanced practitioner order or complex needs related to functional status, cognitive ability, or social support system  5/10/2022 2139 by Fransisco Mccracken RN  Outcome: Progressing  5/10/2022 2001 by Fransisco Mccracken RN  Outcome: Progressing     Problem: BIRTH - VAGINAL/ SECTION  Goal: Fetal and maternal status remain reassuring during the birth process  Description: INTERVENTIONS:  - Monitor vital signs  - Monitor fetal heart rate  - Monitor uterine activity  - Monitor labor progression (vaginal delivery)  - DVT prophylaxis  - Antibiotic prophylaxis  5/10/2022 2139 by Fransisco Mccracken RN  Outcome: Completed  5/10/2022 2001 by Fransisco Mccracken RN  Outcome: Progressing  Goal: Emotionally satisfying birthing experience for mother/fetus  Description: Interventions:  - Assess, plan, implement and evaluate the nursing care given to the patient in labor  - Advocate the philosophy that each childbirth experience is a unique experience and support the family's chosen level of involvement and control during the labor process   - Actively participate in both the patient's and family's teaching of the birth process  - Consider cultural, Hindu and age-specific factors and plan care for the patient in labor  5/10/2022 2139 by Aravind Rushing RN  Outcome: Completed  5/10/2022 2001 by Aravind Rushing RN  Outcome: Progressing     Problem: POSTPARTUM  Goal: Experiences normal postpartum course  Description: INTERVENTIONS:  - Monitor maternal vital signs  - Assess uterine involution and lochia  Outcome: Progressing  Goal: Appropriate maternal -  bonding  Description: INTERVENTIONS:  - Identify family support  - Assess for appropriate maternal/infant bonding   -Encourage maternal/infant bonding opportunities  - Referral to  or  as needed  Outcome: Progressing  Goal: Establishment of infant feeding pattern  Description: INTERVENTIONS:  - Assess breast/bottle feeding  - Refer to lactation as needed  Outcome: Progressing  Goal: Incision(s), wounds(s) or drain site(s) healing without S/S of infection  Description: INTERVENTIONS  - Assess and document dressing, incision, wound bed, drain sites and surrounding tissue  - Provide patient and family education    Outcome: Progressing

## 2022-05-11 NOTE — PROGRESS NOTES
Progress Note - OB/GYN  Elva Corona 36 y o  female MRN: [id number]  Unit/Bed#: -01 Encounter: [id number]    ASSESSMENT:  Postpartum day #1 s/p 1LTCS (laboring, unstable lie), stable, baby in room  Blood pressures: 90s-130s/50s-70s    PLAN:  #1  Postoperative state s/p 1LTCS  -  mL, Hgb 12 7 --> f/u am CBC  - Pain well controlled with oral analgesics  - Potts catheter in place, UOP 0 7 cc/kg/hr, Potts to be removed this morning, f/u void trial  - Tolerating PO fluids and solids  - Ambulating without difficulty  - DVT ppx with SCDs (BMI 29)    #2  Continue routine post partum care  - Encourage ambulation  - Encourage breastfeeding  - Anticipate discharge PPD2 or 3      SUBJECTIVE:  Post delivery  Patient is doing well  Lochia WNL  Pain well controlled      Pain: yes, cramping, improved with meds  Tolerating PO: yes  Voiding: yes  Flatus: no  BM: no  Ambulating: yes  Breastfeeding: yes  Chest pain: no  Shortness of breath: no  Leg pain: no  Lochia: WNL      OBJECTIVE:    Vitals:  Vitals:    05/11/22 0030 05/11/22 0130 05/11/22 0230 05/11/22 0330   BP: 116/57 111/56 102/58 109/59   Pulse: 69 80 70 75   Resp: 18 18 18 18   Temp: 97 9 °F (36 6 °C) 98 2 °F (36 8 °C) 98 3 °F (36 8 °C) 98 3 °F (36 8 °C)   TempSrc: Oral Oral Oral Oral   SpO2:  98% 98% 97%   Weight:       Height:             Intake/Output Summary (Last 24 hours) at 5/11/2022 0555  Last data filed at 5/11/2022 0505  Gross per 24 hour   Intake 2620 ml   Output 1281 ml   Net 1339 ml       Lab Results   Component Value Date    WBC 10 34 (H) 05/10/2022    HGB 12 7 05/10/2022    HCT 37 2 05/10/2022    MCV 91 05/10/2022     05/10/2022       Physical Exam:  Gen: NAD  CV: warm and well perfused  Lungs: non-labored breathing  Abd: soft, non-tender, non-distended, no rebound or guarding  Uterine fundus: firm and non-tender, at the umbilicus  Ext: non-tender    Incision: clean, dry, intact      Tello Castellon MD  OB/GYN PGY-1  5/11/2022  5:55 AM

## 2022-05-11 NOTE — PLAN OF CARE
Problem: PAIN - ADULT  Goal: Verbalizes/displays adequate comfort level or baseline comfort level  Description: Interventions:  - Encourage patient to monitor pain and request assistance  - Assess pain using appropriate pain scale  - Administer analgesics based on type and severity of pain and evaluate response  - Implement non-pharmacological measures as appropriate and evaluate response  - Consider cultural and social influences on pain and pain management  - Notify physician/advanced practitioner if interventions unsuccessful or patient reports new pain  Outcome: Progressing     Problem: INFECTION - ADULT  Goal: Absence or prevention of progression during hospitalization  Description: INTERVENTIONS:  - Assess and monitor for signs and symptoms of infection  - Monitor lab/diagnostic results  - Monitor all insertion sites, i e  indwelling lines, tubes, and drains    - [place] appropriate cooling/warming therapies per order  - Administer medications as ordered  - Instruct and encourage patient and family to use good hand hygiene technique  - Identify and instruct in appropriate isolation precautions for identified infection/condition  Outcome: Progressing  Goal: Absence of fever/infection during neutropenic period  Description: INTERVENTIONS:  - Monitor WBC as ordered     Outcome: Progressing     Problem: SAFETY ADULT  Goal: Patient will remain free of falls  Description: INTERVENTIONS:  - Educate patient/family on patient safety including physical limitations  - Instruct patient to call for assistance with activity   - Consult OT/PT to assist with strengthening/mobility   - Keep Call bell within reach  - Keep bed low and locked with side rails adjusted as appropriate  - Keep care items and personal belongings within reach  - Initiate and maintain comfort rounds  - Make Fall Risk Sign visible to staff    - Apply yellow socks and bracelet for high fall risk patients  - Consider moving patient to room near nurses station  Outcome: Progressing  Goal: Maintain or return to baseline ADL function  Description: INTERVENTIONS:  -  Assess patient's ability to carry out ADLs; assess patient's baseline for ADL function and identify physical deficits which impact ability to perform ADLs (bathing, care of mouth/teeth, toileting, grooming, dressing, etc )  - Assess/evaluate cause of self-care deficits   - Assess range of motion  - Assess patient's mobility; develop plan if impaired  - Assess patient's need for assistive devices and provide as appropriate  - Encourage maximum independence but intervene and supervise when necessary  - Involve family in performance of ADLs  - Assess for home care needs following discharge   - Consider OT consult to assist with ADL evaluation and planning for discharge  - Provide patient education as appropriate  Outcome: Progressing  Goal: Maintains/Returns to pre admission functional level  Description: INTERVENTIONS:  - Perform BMAT or MOVE assessment daily    - Set and communicate daily mobility goal to care team and patient/family/caregiver  - Collaborate with rehabilitation services on mobility goals if consulted    - Out of bed for toileting  - Record patient progress and toleration of activity level   Outcome: Progressing     Problem: Knowledge Deficit  Goal: Patient/family/caregiver demonstrates understanding of disease process, treatment plan, medications, and discharge instructions  Description: Complete learning assessment and assess knowledge base    Interventions:  - Provide teaching at level of understanding  - Provide teaching via preferred learning methods  Outcome: Progressing     Problem: DISCHARGE PLANNING  Goal: Discharge to home or other facility with appropriate resources  Description: INTERVENTIONS:  - Identify barriers to discharge w/patient and caregiver  - Arrange for needed discharge resources and transportation as appropriate  - Identify discharge learning needs  - Arrange for interpretive services to assist at discharge as needed  - Refer to Case Management Department for coordinating discharge planning if the patient needs post-hospital services based on physician/advanced practitioner order or complex needs related to functional status, cognitive ability, or social support system  Outcome: Progressing     Problem: BIRTH - VAGINAL/ SECTION  Goal: Fetal and maternal status remain reassuring during the birth process  Description: INTERVENTIONS:  - Monitor vital signs  - Monitor fetal heart rate  - Monitor uterine activity  - Monitor labor progression (vaginal delivery)  - DVT prophylaxis  - Antibiotic prophylaxis  Outcome: Progressing  Goal: Emotionally satisfying birthing experience for mother/fetus  Description: Interventions:  - Assess, plan, implement and evaluate the nursing care given to the patient in labor  - Advocate the philosophy that each childbirth experience is a unique experience and support the family's chosen level of involvement and control during the labor process   - Actively participate in both the patient's and family's teaching of the birth process  - Consider cultural, Bahai and age-specific factors and plan care for the patient in labor  Outcome: Progressing

## 2022-05-11 NOTE — LACTATION NOTE
This note was copied from a baby's chart  CONSULT - LACTATION  Baby Boy Miguel Lyles 1 days male MRN: [id number]    [facility] NURSERY Room / Bed: (N)/(N) Encounter: [id number]    Maternal Information     MOTHER:  Salomón Perez  Maternal Age: 36 y o    OB History: # 1 - Date: 2018, Sex: None, Weight: None, GA: 7w0d, Delivery: None, Apgar1: None, Apgar5: None, Living: None, Birth Comments: None    # 2 - Date: 20, Sex: Male, Weight: 4160 g (9 lb 2 7 oz), GA: 41w1d, Delivery: Vaginal, Spontaneous, Apgar1: 9, Apgar5: 9, Living: Living, Birth Comments: None    # 3 - Date: 05/10/22, Sex: Male, Weight: 3940 g (8 lb 11 oz), GA: 40w3d, Delivery: , Low Transverse, Apgar1: 8, Apgar5: 9, Living: Living, Birth Comments: None   Previouse breast reduction surgery? No    Lactation history:   Has patient previously breast fed: Yes   How long had patient previously breast fed: 12 mo   Previous breast feeding complications: None     Past Surgical History:   Procedure Laterality Date    FINGER SURGERY Right 2008    Ring Finger    WISDOM TOOTH EXTRACTION          Birth information:  YOB: 2020   Time of birth: 9:20 PM   Sex: male   Delivery type: , Low Transverse   Birth Weight: 3940 g (8 lb 11 oz)   Percent of Weight Change: 0%     Gestational Age: 44w3d   [unfilled]    Assessment     Met with mother  Provided mother with Ready, Set, Baby booklet  Discussed Skin to Skin contact an benefits to mom and baby  Talked about the delay of the first bath until baby has adjusted  Spoke about the benefits of rooming in  Feeding on cue and what that means for recognizing infant's hunger  Avoidance of pacifiers for the first month discussed  Talked about exclusive breastfeeding for the first 6 months  Positioning and latch reviewed as well as showing images of other feeding positions    Discussed the properties of a good latch in any position  Reviewed hand/manual expression  Discussed s/s that baby is getting enough milk and some s/s that breastfeeding dyad may need further help  Gave information on common concerns, what to expect the first few weeks after delivery, preparing for other caregivers, and how partners can help  Resources for support also provided  Information on hand expression given  Discussed benefits of knowing how to manually express breast including stimulating milk supply, softening nipple for latch and evacuating breast in the event of engorgement  Discussed 2nd night syndrome and ways to calm infant  Hand out given  Provided DC booklet at this time, enc family to review and prepare questions for day of DC       Feeding recommendations:  breast feed on demand    Srinivasan Aguila RN 5/11/2022 4:54 PM

## 2022-05-11 NOTE — ASSESSMENT & PLAN NOTE
Routine postoperative/postpartum care   Hgb 12 7g/dL --> 10 3g/dL   Voiding spontaneously    Encourage ambulation   Encourage breastfeeding

## 2022-05-11 NOTE — ANESTHESIA PROCEDURE NOTES
Spinal Block    Patient location during procedure: OR  Start time: 5/10/2022 9:00 PM  Reason for block: primary anesthetic  Staffing  Performed: CRNA   Resident/CRNA: Linda Restrepo CRNA  Preanesthetic Checklist  Completed: patient identified, IV checked, risks and benefits discussed, surgical consent, monitors and equipment checked, pre-op evaluation and timeout performed  Spinal Block  Patient position: sitting  Prep: ChloraPrep and site prepped and draped  Patient monitoring: frequent blood pressure checks, continuous pulse ox and heart rate  Approach: midline  Location: L3-4  Injection technique: single-shot  Needle  Needle type: pencil-tip   Needle gauge: 25 G  Assessment  Sensory level: T4  Events: cerebrospinal fluid  Injection Assessment:  negative aspiration for heme, no paresthesia on injection and positive aspiration for clear CSF    Post-procedure:  site cleaned

## 2022-05-11 NOTE — DISCHARGE SUMMARY
Obstetrics Discharge Summary   Yaneth Paniagua 36 y o  female MRN: [id number]  Unit/Bed#: -01 Encounter: [id number]    Admission Date: 5/10/2022     Discharge Date: 2022    Admitting Diagnoses:   Patient Active Problem List   Diagnosis    40 weeks gestation of pregnancy    Breech presentation     Discharge Diagnoses:   Same, delivered    Procedures:   primary  section, low transverse incision    Admitting & Delivering Attending: Dr Jc Rankin  Discharge Attending: Dr Jeremiah Joyce Course:   Yaneth Paniagua is a 36 y o  [id number] who was admitted for labor and found to be breech presentation with fetal head located in right upper quadrant  In triage her cervical exam was 3/70/-3  The decision was made to proceed to the OR for a primary low-transverse  section for breech presentation in the setting of early labor  She underwent an uncomplicated  section delivery and delivered a viable male  on 5/10/22 at 2120  APGARS were 8, 9 at 1 and 5 minutes, respectively   weighed 8lb 11oz  Placenta was delivered shortly after   was then transferred to  nursery  Patient tolerated the procedure well and was transferred to recovery in stable condition  The patient's post partum course was unremarkable    Preoperative hemaglobin was 12 7g/dL, postoperative was 10 3g/dL  Her postoperative pain was well controlled with oral analgesics  On day of discharge, she was ambulating and able to reasonably perform all ADLs  She was voiding and had appropriate bowel function  Pain was well controlled  She was discharged home on post-operative day #2 without complications  Patient was instructed to follow up with her OB as an outpatient and was given appropriate warnings to call provider if she develops signs of infection or uncontrolled pain       Complications:   None apparent    Condition at discharge:   good     Provisions for Follow-Up Care:  See after visit summary for information related to follow-up care and any pertinent home health orders  Disposition:   Home    Planned Readmission:   No    Discharge Medications:   See after visit summary  Discharge instructions :   See after visit summary  Jess Tran MD  PGY II, OB/GYN  5/12/2022, 11:53 AM

## 2022-05-11 NOTE — PLAN OF CARE
Problem: PAIN - ADULT  Goal: Verbalizes/displays adequate comfort level or baseline comfort level  Description: Interventions:  - Encourage patient to monitor pain and request assistance  - Assess pain using appropriate pain scale  - Administer analgesics based on type and severity of pain and evaluate response  - Implement non-pharmacological measures as appropriate and evaluate response  - Consider cultural and social influences on pain and pain management  - Notify physician/advanced practitioner if interventions unsuccessful or patient reports new pain  Outcome: Progressing     Problem: INFECTION - ADULT  Goal: Absence or prevention of progression during hospitalization  Description: INTERVENTIONS:  - Assess and monitor for signs and symptoms of infection  - Monitor lab/diagnostic results  - Monitor all insertion sites, i e  indwelling lines, tubes, and drains  - Monitor endotracheal if appropriate and nasal secretions for changes in amount and color  - [place] appropriate cooling/warming therapies per order  - Administer medications as ordered  - Instruct and encourage patient and family to use good hand hygiene technique  - Identify and instruct in appropriate isolation precautions for identified infection/condition  Outcome: Progressing  Goal: Absence of fever/infection during neutropenic period  Description: INTERVENTIONS:  - Monitor WBC    Outcome: Progressing     Problem: SAFETY ADULT  Goal: Patient will remain free of falls  Description: INTERVENTIONS:  - Educate patient/family on patient safety including physical limitations  - Instruct patient to call for assistance with activity   - Consult OT/PT to assist with strengthening/mobility   - Keep Call bell within reach  - Keep bed low and locked with side rails adjusted as appropriate  - Keep care items and personal belongings within reach  - Initiate and maintain comfort rounds  - Make Fall Risk Sign visible to staff  - Offer Toileting every  Hours, in advance of need  - Initiate/Maintain alarm  - Obtain necessary fall risk management equipment:   - Apply yellow socks and bracelet for high fall risk patients  - Consider moving patient to room near nurses station  Outcome: Progressing  Goal: Maintain or return to baseline ADL function  Description: INTERVENTIONS:  -  Assess patient's ability to carry out ADLs; assess patient's baseline for ADL function and identify physical deficits which impact ability to perform ADLs (bathing, care of mouth/teeth, toileting, grooming, dressing, etc )  - Assess/evaluate cause of self-care deficits   - Assess range of motion  - Assess patient's mobility; develop plan if impaired  - Assess patient's need for assistive devices and provide as appropriate  - Encourage maximum independence but intervene and supervise when necessary  - Involve family in performance of ADLs  - Assess for home care needs following discharge   - Consider OT consult to assist with ADL evaluation and planning for discharge  - Provide patient education as appropriate  Outcome: Progressing  Goal: Maintains/Returns to pre admission functional level  Description: INTERVENTIONS:  - Perform BMAT or MOVE assessment daily    - Set and communicate daily mobility goal to care team and patient/family/caregiver  - Collaborate with rehabilitation services on mobility goals if consulted  - Perform Range of Motion  times a day  - Reposition patient every  hours    - Dangle patient  times a day  - Stand patient  times a day  - Ambulate patient  times a day  - Out of bed to chair  times a day   - Out of bed for meals  times a day  - Out of bed for toileting  - Record patient progress and toleration of activity level   Outcome: Progressing     Problem: Knowledge Deficit  Goal: Patient/family/caregiver demonstrates understanding of disease process, treatment plan, medications, and discharge instructions  Description: Complete learning assessment and assess knowledge base   Interventions:  - Provide teaching at level of understanding  - Provide teaching via preferred learning methods  Outcome: Progressing     Problem: DISCHARGE PLANNING  Goal: Discharge to home or other facility with appropriate resources  Description: INTERVENTIONS:  - Identify barriers to discharge w/patient and caregiver  - Arrange for needed discharge resources and transportation as appropriate  - Identify discharge learning needs (meds, wound care, etc )  - Arrange for interpretive services to assist at discharge as needed  - Refer to Case Management Department for coordinating discharge planning if the patient needs post-hospital services based on physician/advanced practitioner order or complex needs related to functional status, cognitive ability, or social support system  Outcome: Progressing     Problem: POSTPARTUM  Goal: Experiences normal postpartum course  Description: INTERVENTIONS:  - Monitor maternal vital signs  - Assess uterine involution and lochia  Outcome: Progressing  Goal: Appropriate maternal -  bonding  Description: INTERVENTIONS:  - Identify family support  - Assess for appropriate maternal/infant bonding   -Encourage maternal/infant bonding opportunities  - Referral to  or  as needed  Outcome: Progressing  Goal: Establishment of infant feeding pattern  Description: INTERVENTIONS:  - Assess breast/bottle feeding  - Refer to lactation as needed  Outcome: Progressing  Goal: Incision(s), wounds(s) or drain site(s) healing without S/S of infection  Description: INTERVENTIONS  - Assess and document dressing, incision, wound bed, drain sites and surrounding tissue  - Provide patient and family education  - Perform skin care/dressing changes every   Outcome: Progressing

## 2022-05-12 VITALS
BODY MASS INDEX: 28.95 KG/M2 | RESPIRATION RATE: 18 BRPM | WEIGHT: 191 LBS | DIASTOLIC BLOOD PRESSURE: 58 MMHG | TEMPERATURE: 98.4 F | HEIGHT: 68 IN | OXYGEN SATURATION: 98 % | HEART RATE: 80 BPM | SYSTOLIC BLOOD PRESSURE: 124 MMHG

## 2022-05-12 PROCEDURE — 99024 POSTOP FOLLOW-UP VISIT: CPT | Performed by: STUDENT IN AN ORGANIZED HEALTH CARE EDUCATION/TRAINING PROGRAM

## 2022-05-12 RX ORDER — DOCUSATE SODIUM 100 MG/1
100 CAPSULE, LIQUID FILLED ORAL 2 TIMES DAILY
Qty: 10 CAPSULE | Refills: 0 | Status: SHIPPED | OUTPATIENT
Start: 2022-05-12

## 2022-05-12 RX ORDER — IBUPROFEN 600 MG/1
600 TABLET ORAL EVERY 6 HOURS SCHEDULED
Status: DISCONTINUED | OUTPATIENT
Start: 2022-05-12 | End: 2022-05-12 | Stop reason: HOSPADM

## 2022-05-12 RX ORDER — ACETAMINOPHEN 325 MG/1
650 TABLET ORAL EVERY 6 HOURS SCHEDULED
Qty: 22 TABLET | Refills: 0 | Status: SHIPPED | OUTPATIENT
Start: 2022-05-12 | End: 2022-05-15

## 2022-05-12 RX ORDER — IBUPROFEN 600 MG/1
600 TABLET ORAL EVERY 6 HOURS SCHEDULED
Qty: 30 TABLET | Refills: 0 | Status: SHIPPED | OUTPATIENT
Start: 2022-05-12

## 2022-05-12 RX ORDER — SENNOSIDES 8.6 MG
650 CAPSULE ORAL EVERY 8 HOURS PRN
Qty: 30 TABLET | Refills: 0 | Status: SHIPPED | OUTPATIENT
Start: 2022-05-12 | End: 2022-05-12

## 2022-05-12 RX ADMIN — ACETAMINOPHEN 650 MG: 325 TABLET, FILM COATED ORAL at 09:09

## 2022-05-12 RX ADMIN — DOCUSATE SODIUM 100 MG: 100 CAPSULE, LIQUID FILLED ORAL at 09:09

## 2022-05-12 RX ADMIN — IBUPROFEN 600 MG: 600 TABLET ORAL at 06:00

## 2022-05-12 RX ADMIN — ACETAMINOPHEN 650 MG: 325 TABLET, FILM COATED ORAL at 00:32

## 2022-05-12 NOTE — PROGRESS NOTES
Obstetrics Progress Note  Pamella Lyles 36 y o  female MRN: [id number]  Unit/Bed#: -01 Encounter: [id number]    Assessment/Plan:  Postoperative day #2 s/p primary low transverse  delivery  Stable  Baby in room  By issue:  * S/P  section  Assessment & Plan  Routine postoperative/postpartum care   Hgb 12 7g/dL --> 10 3g/dL   Voiding spontaneously    Encourage ambulation   Encourage breastfeeding        Anticipate discharge POD 2-3  Subjective/Objective   Chief Complaint:   Post delivery  Subjective:   Pain: controlled  Tolerating PO: yes  Voiding: yes  Flatus: yes  Bowel Movement: no  Ambulating: yes  Chest pain: no  Shortness of breath: no  Leg pain: no  Lochia: within normal limits  Infant feeding: breast     Objective:   Vitals:   Temp:  [98 °F (36 7 °C)-99 4 °F (37 4 °C)] 98 3 °F (36 8 °C)  HR:  [73-89] 80  Resp:  [18] 18  BP: (102-121)/(56-68) 121/68     Intake/Output Summary (Last 24 hours) at 2022 9768  Last data filed at 2022 1830  Gross per 24 hour   Intake --   Output 3700 ml   Net -3700 ml       Physical Exam:   -General: alert and oriented x 3, in no apparent distress  -Pulmonary: normal effort  -Abdomen/Pelvis: soft, non-tender, non-distended, no rebound or guarding  Uterine fundus firm and non-tender, -1 cm below the umbilicus  Incision: clean, dry and intact  -Extremities: Nontender    Lab Results   Component Value Date    WBC 13 85 (H) 2022    HGB 10 3 (L) 2022    HCT 30 5 (L) 2022    MCV 93 2022     (L) 2022         Jess Frederick MD  PGY-II, OBGYN  2022, 6:26 AM

## 2022-05-12 NOTE — LACTATION NOTE
This note was copied from a baby's chart  CONSULT - LACTATION  Baby Boy Veronia Gowers) Delorenzo 2 days male MRN: [id number]    [facility] NURSERY Room / Bed:  322(N)/ 322(N) Encounter: [id number]    Maternal Information     MOTHER:  Arlyn Wills  Maternal Age: 36 y o    OB History: # 1 - Date: 2018, Sex: None, Weight: None, GA: 7w0d, Delivery: None, Apgar1: None, Apgar5: None, Living: None, Birth Comments: None    # 2 - Date: 20, Sex: Male, Weight: 4160 g (9 lb 2 7 oz), GA: 41w1d, Delivery: Vaginal, Spontaneous, Apgar1: 9, Apgar5: 9, Living: Living, Birth Comments: None    # 3 - Date: 05/10/22, Sex: Male, Weight: 3940 g (8 lb 11 oz), GA: 40w3d, Delivery: , Low Transverse, Apgar1: 8, Apgar5: 9, Living: Living, Birth Comments: None   Previouse breast reduction surgery? No    Lactation history:   Has patient previously breast fed: Yes   How long had patient previously breast fed: 12 mo   Previous breast feeding complications: None     Past Surgical History:   Procedure Laterality Date    FINGER SURGERY Right 2008    Ring Finger    ND  DELIVERY ONLY N/A 5/10/2022    Procedure:  SECTION ();   Surgeon: Gaylin Lesches, MD;  Location: [facility] ;  Service: Obstetrics    WISDOM TOOTH EXTRACTION          Birth information:  YOB: 2020   Time of birth: 9:20 PM   Sex: male   Delivery type: , Low Transverse   Birth Weight: 3940 g (8 lb 11 oz)   Percent of Weight Change: -3%     Gestational Age: 44w3d   [unfilled]    Assessment     Breast and nipple assessment: normal assessment    Feeding assessment: feeding well  LATCH:  Latch: Grasps breast, tongue down, lips flanged, rhythmic sucking   Audible Swallowing: Spontaneous and intermittent (24 hours old)   Type of Nipple: Everted (After stimulation)   Comfort (Breast/Nipple): Soft/non-tender   Hold (Positioning): No assist from staff, mother able to position/hold infant   LATCH Score: 10          Feeding recommendations:  breast feed on demand     Met with parents to review Breastfeeding Discharge Booklet  Discussed feeding log to continue using once home for up to the week ensuring that baby feeds 8-12x in 24 hours and that baby has 6-8 wet diapers as well as 3-4 soiled diapers (looking for stool transition from meconium to a yellow/gold seedy loose stool)  Mother given resources to look up medications to ensure they are safe with breastfeeding, by communicating with the [employer], [address] as well as using [employer]  [employer] (assisted mother to pin to home screen on personal phone)    Mother had no questions regarding engorgement time frame (when mature milk comes in) and management as well as how to deal with conditions that may occur while breastfeeding (plugged ducts, milk blebs and mastitis) and when is appropriate to communicate with their OB/GYN and/or a lactation consultant  Mother did not have questions on how to set up a pump, how to cycle (stimulation vs expression phases during a pumping session), milk storage and cleaning  Mother shown handouts for tips on pumping when returning to work and paced bottle feeding  Mother shown community resources for continued support in breastfeeding once discharged and encouraged to communicate with [employer] and/or a lactation consultant to further assistance once home  During encounter, latch was observed to be deep and mother reported no discomfort  Baby is at a 3 3% weight loss, had a low intermediate bilirubin level and is having appropriate wet and stools for age of life  Mother encouraged to call as needed for additional questions that arise prior to planned discharge today      Ana Foley RN 5/12/2022 10:36 AM

## 2022-05-12 NOTE — PLAN OF CARE
Problem: PAIN - ADULT  Goal: Verbalizes/displays adequate comfort level or baseline comfort level  Description: Interventions:  - Encourage patient to monitor pain and request assistance  - Assess pain using appropriate pain scale  - Administer analgesics based on type and severity of pain and evaluate response  - Implement non-pharmacological measures as appropriate and evaluate response  - Consider cultural and social influences on pain and pain management  - Notify physician/advanced practitioner if interventions unsuccessful or patient reports new pain  5/12/2022 1047 by Stephanie Bailey RN  Outcome: Completed  5/12/2022 1007 by Stephanie Bailey RN  Outcome: Progressing     Problem: INFECTION - ADULT  Goal: Absence or prevention of progression during hospitalization  Description: INTERVENTIONS:  - Assess and monitor for signs and symptoms of infection  - Monitor lab/diagnostic results  - Monitor all insertion sites, i e  indwelling lines, tubes, and drains  - Monitor endotracheal if appropriate and nasal secretions for changes in amount and color  - [place] appropriate cooling/warming therapies per order  - Administer medications as ordered  - Instruct and encourage patient and family to use good hand hygiene technique  - Identify and instruct in appropriate isolation precautions for identified infection/condition  5/12/2022 1047 by Stephanie Bailey RN  Outcome: Completed  5/12/2022 1007 by Stephanie Bailey RN  Outcome: Progressing  Goal: Absence of fever/infection during neutropenic period  Description: INTERVENTIONS:  - Monitor WBC    5/12/2022 1047 by Stephanie Bailey RN  Outcome: Completed  5/12/2022 1007 by Stephanie Bailey RN  Outcome: Progressing     Problem: SAFETY ADULT  Goal: Patient will remain free of falls  Description: INTERVENTIONS:  - Educate patient/family on patient safety including physical limitations  - Instruct patient to call for assistance with activity   - Consult OT/PT to assist with strengthening/mobility - Keep Call bell within reach  - Keep bed low and locked with side rails adjusted as appropriate  - Keep care items and personal belongings within reach  - Initiate and maintain comfort rounds  5/12/2022 1047 by Tasha Lovett RN  Outcome: Completed  5/12/2022 1007 by Tasha Lovett RN  Outcome: Progressing  Goal: Maintain or return to baseline ADL function  Description: INTERVENTIONS:  -  Assess patient's ability to carry out ADLs; assess patient's baseline for ADL function and identify physical deficits which impact ability to perform ADLs (bathing, care of mouth/teeth, toileting, grooming, dressing, etc )  - Assess/evaluate cause of self-care deficits   - Assess range of motion  - Assess patient's mobility; develop plan if impaired  - Assess patient's need for assistive devices and provide as appropriate  - Encourage maximum independence but intervene and supervise when necessary  - Involve family in performance of ADLs  - Assess for home care needs following discharge   - Consider OT consult to assist with ADL evaluation and planning for discharge  - Provide patient education as appropriate  5/12/2022 1047 by Tasha Lovett RN  Outcome: Completed  5/12/2022 1007 by Tasha Lovett RN  Outcome: Progressing       Problem: Knowledge Deficit  Goal: Patient/family/caregiver demonstrates understanding of disease process, treatment plan, medications, and discharge instructions  Description: Complete learning assessment and assess knowledge base    Interventions:  - Provide teaching at level of understanding  - Provide teaching via preferred learning methods  5/12/2022 1047 by Tasha Lovett RN  Outcome: Completed  5/12/2022 1007 by Tasha Lovett RN  Outcome: Progressing     Problem: DISCHARGE PLANNING  Goal: Discharge to home or other facility with appropriate resources  Description: INTERVENTIONS:  - Identify barriers to discharge w/patient and caregiver  - Arrange for needed discharge resources and transportation as appropriate  - Identify discharge learning needs (meds, wound care, etc )  - Arrange for interpretive services to assist at discharge as needed  - Refer to Case Management Department for coordinating discharge planning if the patient needs post-hospital services based on physician/advanced practitioner order or complex needs related to functional status, cognitive ability, or social support system  2022 1047 by Tasha Lovett RN  Outcome: Completed  2022 1007 by Tasha Lovett RN  Outcome: Progressing     Problem: POSTPARTUM  Goal: Experiences normal postpartum course  Description: INTERVENTIONS:  - Monitor maternal vital signs  - Assess uterine involution and lochia  2022 1047 by Tasha Lovett RN  Outcome: Completed  2022 1007 by Tasha Lovett RN  Outcome: Progressing  Goal: Appropriate maternal -  bonding  Description: INTERVENTIONS:  - Identify family support  - Assess for appropriate maternal/infant bonding   -Encourage maternal/infant bonding opportunities  - Referral to  or  as needed  2022 1047 by Tasha Lovett RN  Outcome: Completed  2022 by Tasha Lovett RN  Outcome: Progressing  Goal: Establishment of infant feeding pattern  Description: INTERVENTIONS:  - Assess breast/bottle feeding  - Refer to lactation as needed  2022 1047 by Tasha Lovett RN  Outcome: Completed  2022 by Tasha Lovett RN  Outcome: Progressing  Goal: Incision(s), wounds(s) or drain site(s) healing without S/S of infection  Description: INTERVENTIONS  - Assess and document dressing, incision, wound bed, drain sites and surrounding tissue  - Provide patient and family education  - Perform skin care/dressing changes prn  2022 1047 by Tasha Lovett RN  Outcome: Completed  2022 1007 by Tasha Lovett RN  Outcome: Progressing

## 2022-05-12 NOTE — PLAN OF CARE
Problem: PAIN - ADULT  Goal: Verbalizes/displays adequate comfort level or baseline comfort level  Description: Interventions:  - Encourage patient to monitor pain and request assistance  - Assess pain using appropriate pain scale  - Administer analgesics based on type and severity of pain and evaluate response  - Implement non-pharmacological measures as appropriate and evaluate response  - Consider cultural and social influences on pain and pain management  - Notify physician/advanced practitioner if interventions unsuccessful or patient reports new pain  Outcome: Progressing     Problem: INFECTION - ADULT  Goal: Absence or prevention of progression during hospitalization  Description: INTERVENTIONS:  - Assess and monitor for signs and symptoms of infection  - Monitor lab/diagnostic results  - Monitor all insertion sites, i e  indwelling lines, tubes, and drains  - Monitor endotracheal if appropriate and nasal secretions for changes in amount and color  - [place] appropriate cooling/warming therapies per order  - Administer medications as ordered  - Instruct and encourage patient and family to use good hand hygiene technique  - Identify and instruct in appropriate isolation precautions for identified infection/condition  Outcome: Progressing  Goal: Absence of fever/infection during neutropenic period  Description: INTERVENTIONS:  - Monitor WBC    Outcome: Progressing     Problem: SAFETY ADULT  Goal: Patient will remain free of falls  Description: INTERVENTIONS:  - Educate patient/family on patient safety including physical limitations  - Instruct patient to call for assistance with activity   - Consult OT/PT to assist with strengthening/mobility   - Keep Call bell within reach  - Keep bed low and locked with side rails adjusted as appropriate  - Keep care items and personal belongings within reach  - Initiate and maintain comfort rounds  Outcome: Progressing  Goal: Maintain or return to baseline ADL function  Description: INTERVENTIONS:  -  Assess patient's ability to carry out ADLs; assess patient's baseline for ADL function and identify physical deficits which impact ability to perform ADLs (bathing, care of mouth/teeth, toileting, grooming, dressing, etc )  - Assess/evaluate cause of self-care deficits   - Assess range of motion  - Assess patient's mobility; develop plan if impaired  - Assess patient's need for assistive devices and provide as appropriate  - Encourage maximum independence but intervene and supervise when necessary  - Involve family in performance of ADLs  - Assess for home care needs following discharge   - Consider OT consult to assist with ADL evaluation and planning for discharge  - Provide patient education as appropriate  Outcome: Progressing       Problem: Knowledge Deficit  Goal: Patient/family/caregiver demonstrates understanding of disease process, treatment plan, medications, and discharge instructions  Description: Complete learning assessment and assess knowledge base    Interventions:  - Provide teaching at level of understanding  - Provide teaching via preferred learning methods  Outcome: Progressing     Problem: DISCHARGE PLANNING  Goal: Discharge to home or other facility with appropriate resources  Description: INTERVENTIONS:  - Identify barriers to discharge w/patient and caregiver  - Arrange for needed discharge resources and transportation as appropriate  - Identify discharge learning needs (meds, wound care, etc )  - Arrange for interpretive services to assist at discharge as needed  - Refer to Case Management Department for coordinating discharge planning if the patient needs post-hospital services based on physician/advanced practitioner order or complex needs related to functional status, cognitive ability, or social support system  Outcome: Progressing     Problem: POSTPARTUM  Goal: Experiences normal postpartum course  Description: INTERVENTIONS:  - Monitor maternal vital signs  - Assess uterine involution and lochia  Outcome: Progressing  Goal: Appropriate maternal -  bonding  Description: INTERVENTIONS:  - Identify family support  - Assess for appropriate maternal/infant bonding   -Encourage maternal/infant bonding opportunities  - Referral to  or  as needed  Outcome: Progressing  Goal: Establishment of infant feeding pattern  Description: INTERVENTIONS:  - Assess breast/bottle feeding  - Refer to lactation as needed  Outcome: Progressing  Goal: Incision(s), wounds(s) or drain site(s) healing without S/S of infection  Description: INTERVENTIONS  - Assess and document dressing, incision, wound bed, drain sites and surrounding tissue  - Provide patient and family education  - Perform skin care/dressing changes every   Outcome: Progressing

## 2022-05-12 NOTE — PLAN OF CARE
Problem: PAIN - ADULT  Goal: Verbalizes/displays adequate comfort level or baseline comfort level  Description: Interventions:  - Encourage patient to monitor pain and request assistance  - Assess pain using appropriate pain scale  - Administer analgesics based on type and severity of pain and evaluate response  - Implement non-pharmacological measures as appropriate and evaluate response  - Consider cultural and social influences on pain and pain management  - Notify physician/advanced practitioner if interventions unsuccessful or patient reports new pain  Outcome: Progressing     Problem: INFECTION - ADULT  Goal: Absence or prevention of progression during hospitalization  Description: INTERVENTIONS:  - Assess and monitor for signs and symptoms of infection  - Monitor lab/diagnostic results  - Monitor all insertion sites, i e  indwelling lines, tubes, and drains  - Monitor endotracheal if appropriate and nasal secretions for changes in amount and color  - Elizabeth appropriate cooling/warming therapies per order  - Administer medications as ordered  - Instruct and encourage patient and family to use good hand hygiene technique  - Identify and instruct in appropriate isolation precautions for identified infection/condition  Outcome: Progressing  Goal: Absence of fever/infection during neutropenic period  Description: INTERVENTIONS:  - Monitor WBC    Outcome: Progressing     Problem: SAFETY ADULT  Goal: Patient will remain free of falls  Description: INTERVENTIONS:  - Educate patient/family on patient safety including physical limitations  - Instruct patient to call for assistance with activity   - Consult OT/PT to assist with strengthening/mobility   - Keep Call bell within reach  - Keep bed low and locked with side rails adjusted as appropriate  - Keep care items and personal belongings within reach  - Initiate and maintain comfort rounds  - Make Fall Risk Sign visible to staff  - Apply yellow socks and bracelet for high fall risk patients  - Consider moving patient to room near nurses station  Outcome: Progressing  Goal: Maintain or return to baseline ADL function  Description: INTERVENTIONS:  -  Assess patient's ability to carry out ADLs; assess patient's baseline for ADL function and identify physical deficits which impact ability to perform ADLs (bathing, care of mouth/teeth, toileting, grooming, dressing, etc )  - Assess/evaluate cause of self-care deficits   - Assess range of motion  - Assess patient's mobility; develop plan if impaired  - Assess patient's need for assistive devices and provide as appropriate  - Encourage maximum independence but intervene and supervise when necessary  - Involve family in performance of ADLs  - Assess for home care needs following discharge   - Consider OT consult to assist with ADL evaluation and planning for discharge  - Provide patient education as appropriate  Outcome: Progressing  Goal: Maintains/Returns to pre admission functional level  Description: INTERVENTIONS:  - Perform BMAT or MOVE assessment daily    - Set and communicate daily mobility goal to care team and patient/family/caregiver  - Collaborate with rehabilitation services on mobility goals if consulted  -   - Out of bed for toileting  - Record patient progress and toleration of activity level   Outcome: Progressing     Problem: Knowledge Deficit  Goal: Patient/family/caregiver demonstrates understanding of disease process, treatment plan, medications, and discharge instructions  Description: Complete learning assessment and assess knowledge base    Interventions:  - Provide teaching at level of understanding  - Provide teaching via preferred learning methods  Outcome: Progressing     Problem: DISCHARGE PLANNING  Goal: Discharge to home or other facility with appropriate resources  Description: INTERVENTIONS:  - Identify barriers to discharge w/patient and caregiver  - Arrange for needed discharge resources and transportation as appropriate  - Identify discharge learning needs (meds, wound care, etc )  - Arrange for interpretive services to assist at discharge as needed  - Refer to Case Management Department for coordinating discharge planning if the patient needs post-hospital services based on physician/advanced practitioner order or complex needs related to functional status, cognitive ability, or social support system  Outcome: Progressing     Problem: POSTPARTUM  Goal: Experiences normal postpartum course  Description: INTERVENTIONS:  - Monitor maternal vital signs  - Assess uterine involution and lochia  Outcome: Progressing  Goal: Appropriate maternal -  bonding  Description: INTERVENTIONS:  - Identify family support  - Assess for appropriate maternal/infant bonding   -Encourage maternal/infant bonding opportunities  - Referral to  or  as needed  Outcome: Progressing  Goal: Establishment of infant feeding pattern  Description: INTERVENTIONS:  - Assess breast/bottle feeding  - Refer to lactation as needed  Outcome: Progressing  Goal: Incision(s), wounds(s) or drain site(s) healing without S/S of infection  Description: INTERVENTIONS  - Assess and document dressing, incision, wound bed, drain sites and surrounding tissue  - Provide patient and family education  Outcome: Progressing

## 2022-05-17 ENCOUNTER — POSTPARTUM VISIT (OUTPATIENT)
Dept: OBGYN CLINIC | Facility: MEDICAL CENTER | Age: 32
End: 2022-05-17

## 2022-05-17 VITALS
BODY MASS INDEX: 26.49 KG/M2 | WEIGHT: 174.8 LBS | DIASTOLIC BLOOD PRESSURE: 78 MMHG | HEIGHT: 68 IN | SYSTOLIC BLOOD PRESSURE: 120 MMHG

## 2022-05-17 DIAGNOSIS — Z98.891 S/P CESAREAN SECTION: Primary | ICD-10-CM

## 2022-05-17 LAB — PLACENTA IN STORAGE: NORMAL

## 2022-05-17 PROCEDURE — 99024 POSTOP FOLLOW-UP VISIT: CPT | Performed by: STUDENT IN AN ORGANIZED HEALTH CARE EDUCATION/TRAINING PROGRAM

## 2022-05-17 NOTE — PROGRESS NOTES
Assessment/Plan:      Diagnoses and all orders for this visit:    S/P  section  Recovering well - no concerns re: incision  F/u in 3-4 wks  Subjective:     Patient ID: Carlo Clifton is a 36 y o  female  35 yo  presents for incision check  She is 1 week s/p 1ltcs for breech presentation  Her pregnancy is otherwise uncomplicated  Her pain is mostly well controlled w/ tylenol  She has some soreness around the incision site  Denies drainage  No concerns w/ mood  Has good support from family  Breast feeding  Plans condoms for contraception  Review of Systems   All other systems reviewed and are negative  Objective:     Physical Exam  Pulmonary:      Effort: Pulmonary effort is normal    Abdominal:      Comments: Incision c/d/i   Neurological:      Mental Status: She is alert and oriented to person, place, and time     Psychiatric:         Behavior: Behavior normal

## 2022-06-16 ENCOUNTER — POSTPARTUM VISIT (OUTPATIENT)
Dept: OBGYN CLINIC | Facility: MEDICAL CENTER | Age: 32
End: 2022-06-16

## 2022-06-16 VITALS
WEIGHT: 169 LBS | DIASTOLIC BLOOD PRESSURE: 72 MMHG | HEIGHT: 68 IN | SYSTOLIC BLOOD PRESSURE: 110 MMHG | BODY MASS INDEX: 25.61 KG/M2

## 2022-06-16 DIAGNOSIS — Z98.891 S/P CESAREAN SECTION: Primary | ICD-10-CM

## 2022-06-16 PROCEDURE — 99024 POSTOP FOLLOW-UP VISIT: CPT | Performed by: STUDENT IN AN ORGANIZED HEALTH CARE EDUCATION/TRAINING PROGRAM

## 2022-06-16 NOTE — PROGRESS NOTES
Assessment/Plan:      Diagnoses and all orders for this visit:    S/P  section  Recovering well  Condoms for contraception  Aware of recommended pregnancy intervals  F/u in 2-3 mo for annual exam    Postpartum state          Subjective:     Patient ID:     29 y o  [id number] presents 5 weeks s/p a 1ltcs for breech presentation  She feels well  She reports her lochia has stopped  Her pain is well controlled  She is breast feeding  Her baby boy, Mindy Baxter, is doing well  She has decided on condoms for contraception  She reports good support at home and scored a 2 on the [place] depression scale  Review of Systems   All other systems reviewed and are negative  Objective:     Physical Exam  Vitals reviewed  Pulmonary:      Effort: Pulmonary effort is normal    Abdominal:      Comments: Incision well healed   Neurological:      Mental Status: She is alert and oriented to person, place, and time     Psychiatric:         Behavior: Behavior normal

## 2022-07-29 ENCOUNTER — TELEPHONE (OUTPATIENT)
Dept: OBGYN CLINIC | Facility: CLINIC | Age: 32
End: 2022-07-29

## 2022-07-29 DIAGNOSIS — N60.11 MASTITIS CHRONIC, RIGHT: Primary | ICD-10-CM

## 2022-07-29 NOTE — TELEPHONE ENCOUNTER
This pt is having soreness, burning, warmth on her right breast, and it is uncomfortable while nursing     Please call pt

## 2022-09-22 ENCOUNTER — ANNUAL EXAM (OUTPATIENT)
Dept: OBGYN CLINIC | Facility: MEDICAL CENTER | Age: 32
End: 2022-09-22
Payer: COMMERCIAL

## 2022-09-22 VITALS
HEIGHT: 68 IN | WEIGHT: 161 LBS | DIASTOLIC BLOOD PRESSURE: 70 MMHG | BODY MASS INDEX: 24.4 KG/M2 | SYSTOLIC BLOOD PRESSURE: 120 MMHG

## 2022-09-22 DIAGNOSIS — Z01.419 ENCOUNTER FOR ANNUAL ROUTINE GYNECOLOGICAL EXAMINATION: Primary | ICD-10-CM

## 2022-09-22 PROCEDURE — G0476 HPV COMBO ASSAY CA SCREEN: HCPCS | Performed by: STUDENT IN AN ORGANIZED HEALTH CARE EDUCATION/TRAINING PROGRAM

## 2022-09-22 PROCEDURE — 0503F POSTPARTUM CARE VISIT: CPT | Performed by: STUDENT IN AN ORGANIZED HEALTH CARE EDUCATION/TRAINING PROGRAM

## 2022-09-22 PROCEDURE — 99395 PREV VISIT EST AGE 18-39: CPT | Performed by: STUDENT IN AN ORGANIZED HEALTH CARE EDUCATION/TRAINING PROGRAM

## 2022-09-22 PROCEDURE — G0143 SCR C/V CYTO,THINLAYER,RESCR: HCPCS | Performed by: STUDENT IN AN ORGANIZED HEALTH CARE EDUCATION/TRAINING PROGRAM

## 2022-09-22 NOTE — PROGRESS NOTES
Assessment/Plan:    30 yo [id number] - annual exam after delivery  Problem List Items Addressed This Visit    None     Visit Diagnoses     Encounter for annual routine gynecological examination    -  Primary    Relevant Orders    Liquid-based pap, screening  Condoms for contraception  F/u in 1 yr or prn            Subjective:      Patient ID: Mariaelena Conde is a 29 y o  female  This is a 29 y o  [id number] presents for annual exam after 1ltcs on 5/10/22  Contraception: condoms  Periods: amenorrheic, breast feeding  Sexually active: yes,  w/o issue  STD testing: declines    Screening:  Last pap smear: 2019 neg    Family history:   Breast cancer: paternal grandmother  Ovarian cancer: no  Colon cancer: no    Body mass index is 24 48 kg/m²  Exercise: yes   Diet: healthy    NO mood concerns  Boys doing well! 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  The following portions of the patient's history were reviewed and updated as appropriate: allergies, current medications, past family history, past medical history, past social history, past surgical history and problem list     Review of Systems   Constitutional: Negative  HENT: Negative  Eyes: Negative  Respiratory: Negative  Cardiovascular: Negative  Gastrointestinal: Negative  Endocrine: Negative  Genitourinary: Negative for dyspareunia, dysuria, frequency, menstrual problem, pelvic pain, vaginal discharge and vaginal pain  Musculoskeletal: Negative  Skin: Negative  Allergic/Immunologic: Negative  Neurological: Negative  Hematological: Negative  Psychiatric/Behavioral: Negative  Objective:      /70 (BP Location: Left arm, Patient Position: Sitting, Cuff Size: Large)   Ht 5' 8" (1 727 m)   Wt 73 kg (161 lb)   Breastfeeding Yes   BMI 24 48 kg/m²          Physical Exam  Vitals reviewed  Cardiovascular:      Rate and Rhythm: Normal rate     Pulmonary:      Effort: Pulmonary effort is normal    Chest:   Breasts: Breasts are symmetrical       Right: No mass, nipple discharge, skin change or tenderness  Left: No mass, nipple discharge, skin change or tenderness  Abdominal:      Palpations: Abdomen is soft  Genitourinary:     Labia:         Right: No rash  Left: No rash  Vagina: Normal  No signs of injury  Cervix: No cervical motion tenderness, discharge or lesion  Uterus: Not deviated, not enlarged, not fixed and not tender  Adnexa:         Right: No mass, tenderness or fullness  Left: No mass, tenderness or fullness  Musculoskeletal:      Cervical back: Normal range of motion  Skin:     General: Skin is warm and dry  Neurological:      Mental Status: She is alert and oriented to person, place, and time     Psychiatric:         Behavior: Behavior normal

## 2022-09-23 LAB
HPV HR 12 DNA CVX QL NAA+PROBE: NEGATIVE
HPV16 DNA CVX QL NAA+PROBE: NEGATIVE
HPV18 DNA CVX QL NAA+PROBE: NEGATIVE

## 2022-09-30 LAB
LAB AP GYN PRIMARY INTERPRETATION: NORMAL
Lab: NORMAL

## 2023-09-27 PROBLEM — O09.299 HISTORY OF MACROSOMIA IN INFANT IN PRIOR PREGNANCY, CURRENTLY PREGNANT: Status: RESOLVED | Noted: 2021-11-22 | Resolved: 2023-09-27

## 2023-09-27 PROBLEM — O99.810 ABNORMAL GLUCOSE AFFECTING PREGNANCY: Status: RESOLVED | Noted: 2021-12-05 | Resolved: 2023-09-27

## 2023-09-27 PROBLEM — Z34.93 ENCOUNTER FOR SUPERVISION OF NORMAL PREGNANCY IN THIRD TRIMESTER: Status: RESOLVED | Noted: 2021-10-25 | Resolved: 2023-09-27

## 2023-09-27 PROBLEM — Z98.891 S/P CESAREAN SECTION: Status: RESOLVED | Noted: 2021-12-21 | Resolved: 2023-09-27

## 2023-09-28 ENCOUNTER — ANNUAL EXAM (OUTPATIENT)
Dept: OBGYN CLINIC | Facility: MEDICAL CENTER | Age: 33
End: 2023-09-28
Payer: COMMERCIAL

## 2023-09-28 VITALS
HEIGHT: 68 IN | SYSTOLIC BLOOD PRESSURE: 116 MMHG | WEIGHT: 165 LBS | BODY MASS INDEX: 25.01 KG/M2 | DIASTOLIC BLOOD PRESSURE: 78 MMHG

## 2023-09-28 DIAGNOSIS — M62.08 DIASTASIS RECTI: ICD-10-CM

## 2023-09-28 DIAGNOSIS — Z01.419 ENCOUNTER FOR ANNUAL ROUTINE GYNECOLOGICAL EXAMINATION: Primary | ICD-10-CM

## 2023-09-28 PROCEDURE — S0612 ANNUAL GYNECOLOGICAL EXAMINA: HCPCS | Performed by: CLINICAL NURSE SPECIALIST

## 2023-09-28 NOTE — PROGRESS NOTES
Subjective:        Skip Kin is a 34 y.o. female. Here for Gynecologic Exam (pap 22 -/-/Bc none/ not interested //Paternal grandmother with breast cancer )      GYN HPI  Here for annual gyn exam  Menstrual cycle:  Patient reports irregular menstrual pattern, this is not new for her, Occurs at least every 2 months. Vaginal c/o: denies  Urinary c/o: denies  Breast complaints:denies  She does not do self breast Exams    Sexually active: yes, no new partner  Contraception: condoms  She reports she feels safe at home. The following portions of the patient's history were reviewed and updated as appropriate: allergies, current medications, past family history, past medical history, past social history, past surgical history, and problem list.    Hereditary Cancer Screening  Cancer-related family history includes Breast cancer in her paternal grandmother; Skin cancer in her maternal grandfather. There is no history of Colon cancer, Ovarian cancer, Uterine cancer, or Cervical cancer. Substance Abuse Screening Completed. See hx and flowsheet. Screens Positive for none         HEALTH MAINTENANCE SCREENINGS:    Last Papanicolaou test:  2022 History of abnormal pap: No,     IMMUNIZATIONS  Gardasil HPV vaccine was not completed    Review of Systems   Constitutional: Negative for appetite change, chills, fatigue, fever and unexpected weight change. HENT: Negative. Eyes: Negative. Respiratory: Negative for chest tightness and shortness of breath. Cardiovascular: Negative for chest pain and palpitations. Gastrointestinal: Negative for abdominal pain, constipation and vomiting. Endocrine: Negative for cold intolerance and heat intolerance. Genitourinary:        As per HPI   Musculoskeletal: Negative for back pain, joint swelling and neck pain. Skin: Negative for color change and rash. Neurological: Negative for dizziness, weakness and numbness.    Hematological: Does not bruise/bleed easily. Psychiatric/Behavioral: Negative. Objective:  /78 (BP Location: Left arm, Patient Position: Sitting, Cuff Size: Standard)   Ht 5' 8" (1.727 m)   Wt 74.8 kg (165 lb)   LMP 09/01/2023 (Exact Date)   Breastfeeding No   BMI 25.09 kg/m²        Physical Exam  Constitutional:       General: She is not in acute distress. Appearance: Normal appearance. Genitourinary:      Vulva and rectum normal.      No lesions in the vagina. Right Labia: No rash or lesions. Left Labia: No lesions or rash. No vaginal discharge, erythema, tenderness or bleeding. Right Adnexa: not tender and no mass present. Left Adnexa: not tender and no mass present. No cervical motion tenderness, discharge or friability. Uterus is not enlarged or tender. No urethral prolapse present. Pelvic exam was performed with patient in the lithotomy position. Breasts:     Breasts are symmetrical.      Right: No inverted nipple, mass, nipple discharge, skin change or tenderness. Left: No inverted nipple, mass, nipple discharge, skin change or tenderness. HENT:      Head: Normocephalic and atraumatic. Cardiovascular:      Rate and Rhythm: Normal rate. Heart sounds: No murmur heard. Pulmonary:      Effort: Pulmonary effort is normal.      Breath sounds: Normal breath sounds. Abdominal:      General: There is no distension. Palpations: Abdomen is soft. Tenderness: There is no abdominal tenderness. Musculoskeletal:         General: Normal range of motion. Cervical back: Normal range of motion. Lymphadenopathy:      Cervical: No cervical adenopathy. Neurological:      Mental Status: She is alert and oriented to person, place, and time. Skin:     General: Skin is warm and dry. Psychiatric:         Mood and Affect: Mood normal.         Behavior: Behavior normal.   Vitals reviewed.                Assessment/Plan:           ANNUAL GYN EXAM- Primary  Annual GYN examination completed today. Risk prevention and anticipatory guidance provided including:  • Risk for hereditary cancers: Family history reviewed and patient does not qualify for referral for genetics consult/testing. Referral to genetics oncology offered as indicated. • Calcium and vitamin D supplementation. • Dietary and lifestyle recommendations based on her age and weight. body mass index is 25.09 kg/m². .    • Tobacco and alcohol use, intervention ordered if applicable. Cervical cancer screening  Previous pap smears and ASCCP screening guidelines have been reviewed. Pap smear is not indicated at this time. Contraception   using condoms. declines discussion on alternatives    STI Screening  STI screening is declined. STI prevention discussed with use of condoms. Breast exam and breast cancer screening  Breast exam was done; , breast cancer imaging/screening is not indicated at this time. Problem List Items Addressed This Visit     Diastasis recti     16 months PP. Still has 2-3 Fingerbreadth Diastasis recti. Has been trying some exercises at home.  Referral given to PT         Relevant Orders    Ambulatory Referral to Physical Therapy   Other Visit Diagnoses     Encounter for annual routine gynecological examination    -  Primary          Orders Placed This Encounter   Procedures   • Ambulatory Referral to Physical Therapy

## 2023-09-28 NOTE — ASSESSMENT & PLAN NOTE
16 months PP. Still has 2-3 Fingerbreadth Diastasis recti. Has been trying some exercises at home.  Referral given to PT

## 2023-10-02 ENCOUNTER — EVALUATION (OUTPATIENT)
Dept: PHYSICAL THERAPY | Facility: CLINIC | Age: 33
End: 2023-10-02
Payer: COMMERCIAL

## 2023-10-02 DIAGNOSIS — M62.08 DIASTASIS RECTI: Primary | ICD-10-CM

## 2023-10-02 PROCEDURE — 97161 PT EVAL LOW COMPLEX 20 MIN: CPT | Performed by: PHYSICAL THERAPIST

## 2023-10-02 PROCEDURE — 97112 NEUROMUSCULAR REEDUCATION: CPT | Performed by: PHYSICAL THERAPIST

## 2023-10-02 NOTE — PROGRESS NOTES
PT Evaluation     Today's date: 10/2/2023  Patient name: Shaan Peter  : 1990  MRN: [id number]  Referring provider: ALEXX Vela  Dx:   Encounter Diagnosis     ICD-10-CM    1. Diastasis recti  M62.08 Ambulatory Referral to Physical Therapy                     Assessment  Assessment details:   CASE SUMMARY:   Shaan Peter is a 34y.o. year old female who reports onset of symptoms ~  16 weeks. Patient describes symptoms as: bothersome, core weakness, and increase urgency. Symptoms are : intermittent. Wojciech Doom is limited in the following activities core exercises, increase urgency, recreational activities that require heavy lifting, yoga, and piliates. PMHx includes: See chart for full details with medications. Patient's clinical presentation is consistent with their referring diagnosis of: Diastasis recti  (primary encounter diagnosis)  Plan: Ambulatory Referral to Physical Therapy  . POC was discussed and agreed upon with patient. Patient was educated on: abdominal muscle function, pelvic floor function,rehab prognosis and diagnosis, POC, and HEP. Patient vocalized a good understanding of  POC and HEP issued. Patient would benefit from skilled physical therapy services to address their aforementioned functional limitations and progress towards prior level of function and independence with home exercise program.      Pelvic floor verbal consent and written consent signed and in chart  Patient deferred second person in room: YES        Impairments: abnormal muscle firing, abnormal muscle tone, activity intolerance, impaired physical strength, lacks appropriate home exercise program, poor posture  and poor body mechanics    Symptom irritability: highUnderstanding of Dx/Px/POC: good   Prognosis: good    Goals  STG:  -The patient will improve pelvic floor muscle strength 1 to 2 grade in 8 to 8 weeks.    -The patient will improve pelvic floor muscle endurance to 2 to 3 seconds in 8 to 8 weeks. -The patient will reduce diastasis of the rectus abdominis by 1/2 to 1 finger widths in 8 to 12 weeks      LT. The patient will be independent with HEP upon discharge. 2. The patient will perform higher level activities and exercise without leaking upon discharge. 3.  The patient will be able participate in all recreational core related activities that she was able to do prior to pregnancy with no discomfort. Plan  Plan details: HEP development, stretching, strengthening, A/AA/PROM, joint mobilizations, posture education, STM/MI as needed to reduce muscle tension, muscle reeducation, patient has been educated in Dx, prognosis and plan of care and is in agreement. Patient would benefit from: PT eval, women's health eval and skilled physical therapy  Planned modality interventions: biofeedback, cryotherapy, ultrasound, TENS and hydrotherapy  Planned therapy interventions: abdominal trunk stabilization, activity modification, manual therapy, massage, motor coordination training, neuromuscular re-education, body mechanics training, patient education, postural training, self care, strengthening, therapeutic exercise, therapeutic activities, home exercise program, breathing training and flexibility  Frequency: 1x week  Duration in weeks: 8  Plan of Care beginning date: 10/2/2023  Plan of Care expiration date: 2023        PT Pelvic Floor Subjective:   History of Present Illness:   Pt reports that she gave birth to her son 16 weeks ago. States that it was a casserian while her prior pregnancy was a vaginal delivery. Pt reports that she is currently having an issue with her Diastasis Recti  As she has noted the separation is not improving with home exercise videos she was been doing. States that she has not had any issues with bowel movements however does report increase urgency and frequency of urination. Pt denies any pain with functional or recreational activities.  However, she is worried that symptoms might get worse and concerned that she may no tbe engaging muscles correctly. Pt goal is get back to strength training and increase her activity tolerance without further complication. Mechanism of injury: childbirth          Not a recurrent problem   Quality of life: good    Social Support:     Lives in:  Multiple-level home    Lives with:  Spouse    Relationship status: /committed    Work status: unemployed    Life stress level: 6    History of Depression: no  Hand dominance:  Right  Diet and Exercise:    Diet:balanced nutrition    Exercise type: walking and combination activity    15-20 min    Exercise frequency: 2-3 times per week  OB/ gyn History    Gestational History:     Prior Pregnancy: Yes      Number of prior pregnancies: 2    Number of term pregnancies: 2    Delivery Type: vaginal delivery and  section      Menstrual History:    Date of last menstrual period: 2023    Menstrual irregularities irregular menses    Painful periods:  Difficulty managing menstrual pain    Tolerates tampons: yes    Birth control method: condoms  Bladder Function:     Voiding Difficulties positive for: urgency and frequent urination      Voiding Difficulties comments:     Voiding frequency: every 1-2 hours    Urinary leakage: no urine leakage    Nocturia (episodes per night): 1    Painful urination: No      Fluid Intake Type:  Coffee and water    Intake (ounces): Water: 64, Coffee: 16,   Incontinence Management:     Pads/Diaper Use:  None  Bowel Function:     Bowel frequency: daily    Cobb Stool Scale: type 3 and type 4    Stool softener use: no stool softeners    Enema use: no enema    Uses "squatty potty": no Squatty Potty  Sexual Function:     Pain during intercourse: No      Lubrication Use: Yesnot able to acheive orgasm  Pain:     No pain reported by patient.   Treatments:     Previous treatment:  Physical therapy    Current treatment: physical therapy    Patient Goals: Patient goals for therapy:  Improved quality of life, improved comfort and return to sport/leisure activities      Objective       Abdominal Assessment:      Position: supine exam    Diastatis   Diastasis recti present: yes  3" above umbilicus (# fingers): 1.5  Umbilicus (# fingers): 2  3" below umbilicus (# fingers): 1.5  Connective tissue integrity at linea alba: boggy  no tenderness at linea alba  able to engage transverse abdominis     Skin inspection:   scars present. Number of scars: 1  Scar 1 location: above pubic bone . Sensitivity level low Restriction level low       General Perineum Exam:   Positive for gaping introitus and no pelvic organ prolapse at rest.     Visual Inspection of Perineum:   Excursion of perineal body in cephalad direction with contraction of pelvic floor muscles (PFM): fair  and good  Excursion of perineal body in caudal direction with relaxation of pelvic floor muscles (PFM): fair  and good   Involuntary contraction with coughing: yes  Cotton swab test: non-tender  Cough reflex: cough reflex  Sphincter Tone Resting: normal  Sphincter Tone Squeeze: normal  Sensation: intact    Pelvic Organ Prolapse   no pelvic organ prolapse  Position: hook-lying  At rest: none  With bearing down: none  Perineal body inspection: within normal limits        Pelvic Floor Muscle Exam:    Breathing pattern with contraction: within normal limits   Pelvic floor muscle relaxation is complete. PERFECT Score   Power right: 2+/5   Power left: 2+/5      pelvic floor exam consent given by patient    Pelvic exam completed: vaginally                Precautions: Standard  SOC: 10/02/23  POC: 11/20/23  HEP:   Access Code: [email or web address]  URL: [email or web address]  Date: 10/02/2023  Prepared by: Mahogany Frederick    Exercises  - Supine Transversus Abdominis Bracing - Hands on Stomach  - 1 x daily - 7 x weekly - 3 sets - 10 reps  - Supine Diaphragmatic Breathing  - 1 x daily - 7 x weekly - 3 sets - 10 reps  - Supine Pelvic Floor Contraction  - 1 x daily - 7 x weekly - 3 sets - 10 reps      Manuals 10/02/23            PFM assessment  Verbal and  written consent provided- IM                                                    Neuro Re-Ed             TA iso  5s x 10             TA w/ SLR   educated and reviewed    Pt education of pelvic floor muscle function, prognosis and diagnosis IM              Kegels + TA  X 3   5-10s hold             Quick flicks  reviewed             Bridges + TA              TA + fallouts             Bird-dog + TA              TA + quadraped              TA + ball sq 90/90              TA + heel ext              Ther Ex             Prone press ups              SAM             Happy baby              child pose              pallof press              TB rows and ext + TA activation                                       Ther Activity                                       Gait Training                                       Modalities

## 2023-10-09 ENCOUNTER — OFFICE VISIT (OUTPATIENT)
Dept: PHYSICAL THERAPY | Facility: CLINIC | Age: 33
End: 2023-10-09
Payer: COMMERCIAL

## 2023-10-09 DIAGNOSIS — M62.08 DIASTASIS RECTI: Primary | ICD-10-CM

## 2023-10-09 PROCEDURE — 97112 NEUROMUSCULAR REEDUCATION: CPT | Performed by: PHYSICAL THERAPIST

## 2023-10-09 PROCEDURE — 97110 THERAPEUTIC EXERCISES: CPT | Performed by: PHYSICAL THERAPIST

## 2023-10-09 NOTE — PROGRESS NOTES
Daily Note     Today's date: 10/9/2023  Patient name: Sarah Weiner  : 1990  MRN: [id number]  Referring provider: ALEXX Bell  Dx:   Encounter Diagnosis     ICD-10-CM    1. Diastasis recti  M62.08                      Subjective: Pt reports feeling well overall. Denies any current pain. Reports that she has been doing HEP regularly. Objective: See treatment diary below      Assessment: Tolerated treatment well. Patient demonstrated fatigue post treatment, exhibited good technique with therapeutic exercises and would benefit from continued PT      Plan: Continue per plan of care. Precautions: Standard  SOC: 10/02/23  POC: 23  HEP:   Access Code: [email or web address]  URL: [email or web address]  Date: 10/02/2023  Prepared by: Gi Us    Exercises  - Supine Transversus Abdominis Bracing - Hands on Stomach  - 1 x daily - 7 x weekly - 3 sets - 10 reps  - Supine Diaphragmatic Breathing  - 1 x daily - 7 x weekly - 3 sets - 10 reps  - Supine Pelvic Floor Contraction  - 1 x daily - 7 x weekly - 3 sets - 10 reps      Manuals 10/02/23 10/09/23           PFM assessment  Verbal and  written consent provided- IM                                                    Neuro Re-Ed             TA iso  5s x 10  rev           TA w/ SLR   educated and reviewed    Pt education of pelvic floor muscle function, prognosis and diagnosis IM   X 10 B            Kegels + TA  X 3   5-10s hold             Quick flicks  reviewed             Bridges + TA   normal bridges x 10     +ball sq x 10     + hip abd w/ resistance x 10     + marches x 10            TA + fallouts  X 10 B            Bird-dog + TA   Modified bird-dog x 10     Bird-dog full x 10 B 3s hold            TA + quadraped   X 15 5s hold            TA + ball sq 90/90   X 10 5s hold            TA + heel ext   X 10 B            Ther Ex             Prone press ups              SAM             Happy baby              child pose   10s x 1 0 pallof press              TB rows and ext + TA activation               clamsshells x 20 B              piriformis stretch x 10              Cat camel x 10 5s hold            Ther Activity                                       Gait Training                                       Modalities

## 2023-10-16 ENCOUNTER — OFFICE VISIT (OUTPATIENT)
Dept: PHYSICAL THERAPY | Facility: CLINIC | Age: 33
End: 2023-10-16
Payer: COMMERCIAL

## 2023-10-16 DIAGNOSIS — M62.08 DIASTASIS RECTI: Primary | ICD-10-CM

## 2023-10-16 PROCEDURE — 97110 THERAPEUTIC EXERCISES: CPT | Performed by: PHYSICAL THERAPIST

## 2023-10-16 PROCEDURE — 97112 NEUROMUSCULAR REEDUCATION: CPT | Performed by: PHYSICAL THERAPIST

## 2023-10-16 PROCEDURE — 97530 THERAPEUTIC ACTIVITIES: CPT | Performed by: PHYSICAL THERAPIST

## 2023-10-16 NOTE — PROGRESS NOTES
Daily Note     Today's date: 10/16/2023  Patient name: Riley Hayes  : 1990  MRN: [id number]  Referring provider: ALEXX Sams  Dx:   Encounter Diagnosis     ICD-10-CM    1. Diastasis recti  M62.08                      Subjective: Pt reports that she is feeling well overall. Has been doing HEP regularly. Objective: See treatment diary below      Assessment: Tolerated treatment well. Patient  responded well to advancement of core exercises. Incoorprating breathing well throughout session to decrease pressure and pain in her pelvic floor. Pt will continue to benefit from skilled PT in order to maximize strength. Next session will be incorporating treadmill to begin light jogging. Plan: Continue per plan of care. Precautions: Standard  SOC: 10/02/23  POC: 23  HEP:   Access Code: [email or web address]  URL: [email or web address]  Date: 10/02/2023  Prepared by: Eli Packer    Exercises  - Supine Transversus Abdominis Bracing - Hands on Stomach  - 1 x daily - 7 x weekly - 3 sets - 10 reps  - Supine Diaphragmatic Breathing  - 1 x daily - 7 x weekly - 3 sets - 10 reps  - Supine Pelvic Floor Contraction  - 1 x daily - 7 x weekly - 3 sets - 10 reps      Manuals 10/02/23 10/09/23 10/16/23          PFM assessment  Verbal and  written consent provided- IM                                                    Neuro Re-Ed             TA iso  5s x 10  rev           TA w/ SLR   educated and reviewed    Pt education of pelvic floor muscle function, prognosis and diagnosis IM   X 10 B            Kegels + TA  X 3   5-10s hold   Happy baby x 10 10s hold           Quick flicks  reviewed             Bridges + TA   normal bridges x 10     +ball sq x 10     + hip abd w/ resistance x 10     + marches x 10  +ball sq x 10     + hip abd w/ resistance x 10     + marches x 10           TA + fallouts  X 10 B  X 15 B           Bird-dog + TA   Modified bird-dog x 10     Bird-dog full x 10 B 3s hold Bird-dog full x 10 B 5s hold     Modified side plank x 10 B 3s hold           TA + quadraped   X 15 5s hold            TA + ball sq 90/90   X 10 5s hold  X 15 5s hold          TA + heel ext   X 10 B  X 15 B           Ther Ex             Prone press ups    Primal push ups w/ TA engagement x 10           SAM             Happy baby              child pose   10s x 1 0 10s x 10           pallof press              TB rows and ext + TA activation               clamsshells x 20 B  X 15 B   Reverse clamshell x 15 B             piriformis stretch x 10  10s x 5 B             Cat camel x 10 5s hold  5s hold x 10           Ther Activity                                       Gait Training                                       Modalities

## 2023-10-23 ENCOUNTER — OFFICE VISIT (OUTPATIENT)
Dept: PHYSICAL THERAPY | Facility: CLINIC | Age: 33
End: 2023-10-23
Payer: COMMERCIAL

## 2023-10-23 DIAGNOSIS — M62.08 DIASTASIS RECTI: Primary | ICD-10-CM

## 2023-10-23 PROCEDURE — 97112 NEUROMUSCULAR REEDUCATION: CPT | Performed by: PHYSICAL THERAPIST

## 2023-10-23 PROCEDURE — 97110 THERAPEUTIC EXERCISES: CPT | Performed by: PHYSICAL THERAPIST

## 2023-10-23 NOTE — PROGRESS NOTES
Daily Note     Today's date: 10/23/2023  Patient name: Sarah Boyle  : 1990  MRN: [id number]  Referring provider: ALEXX Paredes  Dx:   Encounter Diagnosis     ICD-10-CM    1. Diastasis recti  M62.08                      Subjective: Pt reports that she is doing well. Denies any pain and is pleased with her overall progress. Objective: See treatment diary below      Assessment: Tolerated treatment well. Patient demonstrated fatigue post treatment, exhibited good technique with therapeutic exercises, and would benefit from continued PT      Plan: Continue per plan of care. Precautions: Standard  SOC: 10/02/23  POC: 23  HEP:   Access Code: [email or web address]  URL: [email or web address]  Date: 10/02/2023  Prepared by: Ev Laws    Exercises  - Supine Transversus Abdominis Bracing - Hands on Stomach  - 1 x daily - 7 x weekly - 3 sets - 10 reps  - Supine Diaphragmatic Breathing  - 1 x daily - 7 x weekly - 3 sets - 10 reps  - Supine Pelvic Floor Contraction  - 1 x daily - 7 x weekly - 3 sets - 10 reps      Manuals 10/02/23 10/09/23 10/16/23 10/23/23         PFM assessment  Verbal and  written consent provided- IM                                                    Neuro Re-Ed             TA iso  5s x 10  rev           TA w/ SLR   educated and reviewed    Pt education of pelvic floor muscle function, prognosis and diagnosis IM   X 10 B            Kegels + TA  X 3   5-10s hold   Happy baby x 10 10s hold           Quick flicks  reviewed             Bridges + TA   normal bridges x 10     +ball sq x 10     + hip abd w/ resistance x 10     + marches x 10  +ball sq x 10     + hip abd w/ resistance x 10     + marches x 10  + marches x 15     + 2kg weighted ball x 15          TA + fallouts  X 10 B  X 15 B           Bird-dog + TA   Modified bird-dog x 10     Bird-dog full x 10 B 3s hold  Bird-dog full x 10 B 5s hold     Modified side plank x 10 B 3s hold  X 20 3s hold          TA + quadraped   X 15 5s hold   Modified dead bug x 10 B          TA + ball sq 90/90   X 10 5s hold  X 15 5s hold          TA + heel ext   X 10 B  X 15 B  X 15          Ther Ex             Prone press ups    Primal push ups w/ TA engagement x 10           SAM    TM 0-4.0% incline; 1.0-3.5 speed x 10 min          Happy baby              child pose   10s x 1 0 10s x 10           pallof press     X 20 red tube          TB rows and ext + TA activation    X 20 Red tube            clamsshells x 20 B  X 15 B   Reverse clamshell x 15 B  X 20   Normal clamshells B            piriformis stretch x 10  10s x 5 B  10s x 5            Cat camel x 10 5s hold  5s hold x 10  Active HS stretch x 10 10s hold          Ther Activity                                       Gait Training                                       Modalities

## 2023-10-30 ENCOUNTER — OFFICE VISIT (OUTPATIENT)
Dept: PHYSICAL THERAPY | Facility: CLINIC | Age: 33
End: 2023-10-30
Payer: COMMERCIAL

## 2023-10-30 DIAGNOSIS — M62.08 DIASTASIS RECTI: Primary | ICD-10-CM

## 2023-10-30 PROCEDURE — 97112 NEUROMUSCULAR REEDUCATION: CPT | Performed by: PHYSICAL THERAPIST

## 2023-10-30 PROCEDURE — 97110 THERAPEUTIC EXERCISES: CPT | Performed by: PHYSICAL THERAPIST

## 2023-10-30 NOTE — PROGRESS NOTES
Daily Note     Today's date: 10/30/2023  Patient name: Gavi Knapp  : 1990  MRN: [id number]  Referring provider: ALEXX Kenney  Dx:   Encounter Diagnosis     ICD-10-CM    1. Diastasis recti  M62.08                      Subjective: Pt reports that she is feeling well. Doing her HEP regularly and reports that she has noted improvements in strength. Pt would like to return to regular exercise routine. Objective: See treatment diary below      Assessment: Tolerated treatment well. Patient demonstrated fatigue post treatment, exhibited good technique with therapeutic exercises, and would benefit from continued PT      Plan: Continue per plan of care. Precautions: Standard  SOC: 10/02/23  POC: 23  HEP:   Access Code: [email or web address]  URL: [email or web address]  Date: 10/02/2023  Prepared by: Leola Huerta    Exercises  - Supine Transversus Abdominis Bracing - Hands on Stomach  - 1 x daily - 7 x weekly - 3 sets - 10 reps  - Supine Diaphragmatic Breathing  - 1 x daily - 7 x weekly - 3 sets - 10 reps  - Supine Pelvic Floor Contraction  - 1 x daily - 7 x weekly - 3 sets - 10 reps      Manuals 10/02/23 10/09/23 10/16/23 10/23/23 10/30/23        PFM assessment  Verbal and  written consent provided- IM                                                    Neuro Re-Ed             TA iso  5s x 10  rev           TA w/ SLR   educated and reviewed    Pt education of pelvic floor muscle function, prognosis and diagnosis IM   X 10 B    SLR hip abd x 10     SLR flexion x 10 B   W/ TA engagements         Kegels + TA  X 3   5-10s hold   Happy baby x 10 10s hold   Kegels + TA x 15 3s hold         Quick flicks  reviewed     Kegels + BKFO x 10 B         Bridges + TA   normal bridges x 10     +ball sq x 10     + hip abd w/ resistance x 10     + marches x 10  +ball sq x 10     + hip abd w/ resistance x 10     + marches x 10  + marches x 15     + 2kg weighted ball x 15  Kegels + bridges x 15 TA + fallouts  X 10 B  X 15 B           Bird-dog + TA   Modified bird-dog x 10     Bird-dog full x 10 B 3s hold  Bird-dog full x 10 B 5s hold     Modified side plank x 10 B 3s hold  X 20 3s hold  X 20 3s hold         TA + quadraped   X 15 5s hold   Modified dead bug x 10 B  Kegels + pallof press x 10B           TA + ball sq 90/90   X 10 5s hold  X 15 5s hold  Kegels + rows x 10         TA + heel ext   X 10 B  X 15 B  X 15  Kegels + antirotation x 10         Ther Ex             Prone press ups    Primal push ups w/ TA engagement x 10           SAM    TM 0-4.0% incline; 1.0-3.5 speed x 10 min  TM 0-4.0% incline; 1.0-3.5 speed x 10 min        Happy baby              child pose   10s x 1 0 10s x 10   10s x 10         pallof press     X 20 red tube  See above         TB rows and ext + TA activation    X 20 Red tube  See above           clamsshells x 20 B  X 15 B   Reverse clamshell x 15 B  X 20   Normal clamshells B  X 15 sidelying clamshells           piriformis stretch x 10  10s x 5 B  10s x 5  10s x 10           Cat camel x 10 5s hold  5s hold x 10  Active HS stretch x 10 10s hold  10 x10s         Ther Activity                                       Gait Training                                       Modalities

## 2023-11-07 ENCOUNTER — OFFICE VISIT (OUTPATIENT)
Dept: PHYSICAL THERAPY | Facility: CLINIC | Age: 33
End: 2023-11-07
Payer: COMMERCIAL

## 2023-11-07 DIAGNOSIS — M62.08 DIASTASIS RECTI: Primary | ICD-10-CM

## 2023-11-07 PROCEDURE — 97112 NEUROMUSCULAR REEDUCATION: CPT

## 2023-11-07 PROCEDURE — 97110 THERAPEUTIC EXERCISES: CPT

## 2023-11-07 NOTE — PROGRESS NOTES
Daily Note     Today's date: 2023  Patient name: Matt Noyola  : 1990  MRN: [id number]  Referring provider: ALEXX Mayer  Dx:   Encounter Diagnosis     ICD-10-CM    1. Diastasis recti  M62.08                      Subjective: Pt reports that she is improving however still has limitations with strength. Objective: See treatment diary below      Assessment: Tolerated treatment well with additional PFM and core strengthening performed with no pain or bulging noted. Patient edu on approriate return to lifting at the gym and to use a lower weight with good form to start and increase as tolerated to avoid injury. Patient edu on exhale with contraction to prevent increase in IAP with lifting. Patient demonstrated fatigue post treatment, exhibited good technique with therapeutic exercises, and would benefit from continued PT      Plan: Continue per plan of care. Precautions: Standard  SOC: 10/02/23  POC: 23    Daily Treatment Log:  Date 2023       Visit # 6       Auth         Auth exp        Manual                        There Exer 15'       TA w/ SLR  Abd and flex 1x10 R/L ea.         Active HS stretch        Sidelying clamshells  RTB 2x10        Beni pose         Happy baby         Piriformis stretch  10" x5 R/L               HEP Updated and discussed        There Activ                                                        NMReed 36'       Rows w/ PFM and TA  Blue TB x15        Shoulder Ext + TA  Blue TB x15        TM 0-4% incline; 1.0-3.5 speed  1% incline to start increase by increments of 1% ea min until at 4% speed of 2mph total of 10 min       Pallof press  Double blue TB x15 R/L        Bird-dog + TA 1x10 R/L        Bridges and PFM  3" x15        Hip add w/ PFM  3" x10        Dead bugs + TA  1x10 R/L        TA w/ head lift  10x        OH carry w/ alt # DB 1x10 R/L UE                Modalities                                  HEP:    Access Code: [email or web address]  URL: [email or web address]  Date: 11/07/2023  Prepared by: Mercedes Granados    Exercises  - Supine Transversus Abdominis Bracing - Hands on Stomach  - 1 x daily - 7 x weekly - 2 sets - 10 reps  - Supine Diaphragmatic Breathing  - 1 x daily - 7 x weekly - 2 sets - 10 reps  - Supine Pelvic Floor Contraction  - 1 x daily - 7 x weekly - 2 sets - 10 reps  - Pelvic Floor Contractions in Hooklying with Adduction  - 1 x daily - 7 x weekly - 1-2 sets - 10 reps - 3 sec hold  - Seated Pelvic Floor Contraction  - 1 x daily - 7 x weekly - 2 sets - 10 reps  - Bird Dog  - 1 x daily - 7 x weekly - 1 sets - 10 reps  - Supine Diastasis Recti Correction with Neck Curl  - 1 x daily - 7 x weekly - 1 sets - 10 reps

## 2023-11-14 ENCOUNTER — OFFICE VISIT (OUTPATIENT)
Dept: PHYSICAL THERAPY | Facility: CLINIC | Age: 33
End: 2023-11-14
Payer: COMMERCIAL

## 2023-11-14 DIAGNOSIS — N81.89 PELVIC FLOOR WEAKNESS IN FEMALE: ICD-10-CM

## 2023-11-14 DIAGNOSIS — M62.08 DIASTASIS RECTI: Primary | ICD-10-CM

## 2023-11-14 PROCEDURE — 97110 THERAPEUTIC EXERCISES: CPT

## 2023-11-14 PROCEDURE — 97112 NEUROMUSCULAR REEDUCATION: CPT

## 2023-11-14 NOTE — PROGRESS NOTES
Daily Note     Today's date: 2023  Patient name: Jessy Pabon  : 1990  MRN: [id number]  Referring provider: ALEXX Alejo  Dx:   Encounter Diagnosis     ICD-10-CM    1. Diastasis recti  M62.08                      Subjective: Pt reports that she is improving however still has limitations with strength. Objective: See treatment diary below  Diastasis measurement (finger width)   3cm above umbilicus: 1cm   Umbilicus: 1.5 cm (about 2in depth)   3cm below umbilicus: 4.5LG    Assessment: Tolerated treatment well with additional reps of PFM and core strengthening performed with no pain or bulging noted. Noted improvement in breath pattern with exhale on contraction to decrease IAP with no cues needed. Noted improvement in diastasis at umbilicus and 3cm above. Patient demonstrated fatigue post treatment, exhibited good technique with therapeutic exercises, and would benefit from continued PT      Plan: Continue per plan of care. Precautions: Standard  SOC: 10/02/23  POC: 23    Daily Treatment Log:  Date 2023      Visit # 6 7      Auth         Auth exp        Manual        Diastasis exam   EG see objective section               There Exer 15' 13'      TA w/ SLR  Abd and flex 1x10 R/L ea. Abd and flex 2x10 R/L ea.        Active HS stretch        Sidelying clamshells  RTB 2x10  GTB 2x10 R/L       Piriformis stretch  10" x5 R/L 15" x4 R/L               HEP Updated and discussed        There Activ                                                        NMReed 36' 36'      Rows w/ PFM and TA  Blue TB x15  Anika 6# 2x10       Shoulder Ext w/ PFM and TA  Blue TB x15  Anika 5# 2x10       TM 0-4% incline; 1.0-3.5 speed  1% incline to start increase by increments of 1% ea min until at 4% speed of 2mph total of 10 min 1% incline to start increase by increments of 1% ea min until at 4% speed of 2mph total of 10 min      Pallof press  Double blue TB x15 R/L  Anika 7.5# 2x10       Bird-dog + TA 1x10 R/L  1x10 R/L       Bridges and PFM  3" x15  3" x10       Hip add w/ PFM  3" x10  3" x10       Dead bugs + TA  1x10 R/L  1x10 R/L       TA w/ head lift  10x  10x       OH carry w/ alt march  5# DB 1x10 R/L UE  5# DB 1x10 R/L UE               Modalities                                  HEP:    Access Code: [email or web address]  URL: [email or web address]  Date: 11/07/2023  Prepared by: Aguilar Lomeli    Exercises  - Supine Transversus Abdominis Bracing - Hands on Stomach  - 1 x daily - 7 x weekly - 2 sets - 10 reps  - Supine Diaphragmatic Breathing  - 1 x daily - 7 x weekly - 2 sets - 10 reps  - Supine Pelvic Floor Contraction  - 1 x daily - 7 x weekly - 2 sets - 10 reps  - Pelvic Floor Contractions in Hooklying with Adduction  - 1 x daily - 7 x weekly - 1-2 sets - 10 reps - 3 sec hold  - Seated Pelvic Floor Contraction  - 1 x daily - 7 x weekly - 2 sets - 10 reps  - Bird Dog  - 1 x daily - 7 x weekly - 1 sets - 10 reps  - Supine Diastasis Recti Correction with Neck Curl  - 1 x daily - 7 x weekly - 1 sets - 10 reps

## 2023-11-21 ENCOUNTER — EVALUATION (OUTPATIENT)
Dept: PHYSICAL THERAPY | Facility: CLINIC | Age: 33
End: 2023-11-21
Payer: COMMERCIAL

## 2023-11-21 DIAGNOSIS — M62.08 DIASTASIS RECTI: Primary | ICD-10-CM

## 2023-11-21 DIAGNOSIS — N81.89 PELVIC FLOOR WEAKNESS IN FEMALE: ICD-10-CM

## 2023-11-21 PROCEDURE — 97110 THERAPEUTIC EXERCISES: CPT

## 2023-11-21 PROCEDURE — 97530 THERAPEUTIC ACTIVITIES: CPT

## 2023-11-21 PROCEDURE — 97112 NEUROMUSCULAR REEDUCATION: CPT

## 2023-11-21 NOTE — PROGRESS NOTES
PT Re-Evaluation     Today's date: 2023  Patient name: Arvind Maldonado  : 1990  MRN: [id number]  Referring provider: ALEXX Villasenor  Dx:   Encounter Diagnosis     ICD-10-CM    1. Diastasis recti  M62.08       2. Pelvic floor weakness in female  N81.89                      Assessment  Assessment details: Arvind Maldonado is a 34y.o. year old female who reports 50% improvement in urge and frequency symptoms as well as diastasis, however deficits are still present. Debbie Kirkpatrick is limited in the following activities core exercises, increase urgency, recreational activities that require heavy lifting, yoga, and piliates. Patient's clinical presentation is consistent with their referring diagnosis of: Diastasis recti as well as urgency and frequency of urination. Patient demo improvement in diastasis distance and PFM strength since last evaluation, however deficits noted especially in endurance. POC was discussed and agreed upon with patient. Patient was educated on updated HEP. Patient would benefit from skilled physical therapy services to address their aforementioned functional limitations and progress towards prior level of function and independence with home exercise program.            Impairments: abnormal muscle firing, abnormal muscle tone, activity intolerance and impaired physical strength    Symptom irritability: highUnderstanding of Dx/Px/POC: good   Prognosis: good    Goals  STG: to be met in 8 weeks   -The patient will improve pelvic floor muscle strength 1 to 2 grade to decrease urgency and incontinence.  --partially met (1/2 grade at level 3 of PFM)   -The patient will improve pelvic floor muscle endurance to 2 to 3 seconds to improve PFM activation with higher level activities and decrease incontinence --- progressing towards  -The patient will reduce diastasis of the rectus abdominis by 1/2 to 1 finger widths ---MET      LTG: to be met in 12 weeks: progressing towards all  1.The patient will be independent with HEP upon discharge. 2. The patient will perform higher level activities and exercise without incontinence upon discharge. 3.  The patient will be able participate in all recreational core related activities that she was able to do prior to pregnancy with no discomfort. Plan  Plan details: HEP development, stretching, strengthening, A/AA/PROM, joint mobilizations, posture education, STM/MI as needed to reduce muscle tension, muscle reeducation, patient has been educated in Dx, prognosis and plan of care and is in agreement. Patient would benefit from: women's health eval and skilled physical therapy  Planned modality interventions: biofeedback, cryotherapy, ultrasound, TENS and hydrotherapy  Planned therapy interventions: abdominal trunk stabilization, activity modification, manual therapy, massage, motor coordination training, neuromuscular re-education, body mechanics training, patient education, postural training, self care, strengthening, therapeutic exercise, therapeutic activities, home exercise program, breathing training and flexibility  Frequency: 1x week  Plan of Care beginning date: 10/2/2023  Plan of Care expiration date: 1/1/2024        PT Pelvic Floor Subjective:   History of Present Illness:   Pt reports to PT for re-eval of PFM weakness and diastasis recti. Patient reports about 50% improvement in symptoms. Patient reports she still has urgency with urination and still has notices her diastasis and core weakness. Overall feels she is doing better but could improve more. Patient to continue with PT at this time. Mechanism of injury: childbirth          Not a recurrent problem   Quality of life: good    Social Support:     Lives in:  Multiple-level home    Lives with:  Spouse    Relationship status: /committed    Work status: unemployed    Life stress level: 6    History of Depression: no  Hand dominance:  Right  Diet and Exercise: Diet:balanced nutrition    Exercise type: walking and combination activity    15-20 min    Exercise frequency: 2-3 times per week  OB/ gyn History    Gestational History:     Prior Pregnancy: Yes      Number of prior pregnancies: 2    Number of term pregnancies: 2    Delivery Type: vaginal delivery and  section      Menstrual History:    Date of last menstrual period: 2023    Menstrual irregularities irregular menses    Painful periods:  Difficulty managing menstrual pain    Tolerates tampons: yes    Birth control: no contraception    Birth control method: condoms  Bladder Function:     Voiding Difficulties positive for: urgency and frequent urination       Voiding Difficulties comments:     Voiding frequency: every 1-2 hours    Urinary leakage: no urine leakage    Nocturia (episodes per night): 1    Painful urination: No      Fluid Intake Type:  Coffee and water    Intake (ounces): Water: 64, Coffee: 16,   Incontinence Management:     Pads/Diaper Use:  None  Bowel Function:     Bowel frequency: daily    Hodgeman Stool Scale: type 3 and type 4    Stool softener use: no stool softeners    Enema use: no enema    Uses "squatty potty": no Squatty Potty  Sexual Function:     Pain during intercourse: No      Lubrication Use: Yesnot able to acheive orgasm  Pain:     No pain reported by patient. Treatments:     Previous treatment:  Physical therapy    Current treatment: physical therapy    Patient Goals:     Patient goals for therapy:  Improved quality of life, improved comfort and return to sport/leisure activities      Objective       Abdominal Assessment:      Position: supine exam    Diastatis   Diastasis recti present: yes  3" above umbilicus (# fingers): 1  Umbilicus (# fingers): 1.5  3" below umbilicus (# fingers): 1.5  Connective tissue integrity at linea alba: boggy  no tenderness at linea alba  able to engage transverse abdominis     Skin inspection:   scars present.    Number of scars: 1  Scar 1 location: above pubic bone . Sensitivity level low Restriction level low     Visual Inspection of Perineum:   Excursion of perineal body in cephalad direction with contraction of pelvic floor muscles (PFM): fair   Excursion of perineal body in caudal direction with relaxation of pelvic floor muscles (PFM): fair   Involuntary contraction with coughing: yes  [place]ton swab test: non-tender  Cough reflex: cough reflex  Sphincter Tone Resting: normal  Sphincter Tone Squeeze: normal  Sensation: intact    Pelvic Organ Prolapse   no pelvic organ prolapse  Position: hook-lying  With bearing down: none  Perineal body inspection: within normal limits        Pelvic Floor Muscle Exam:      Breathing pattern with contraction: within normal limits   Pelvic floor muscle relaxation is complete. PERFECT Score   Power right: 3/5   Power left: 3/5   Endurance (seconds to max): 3   Repetitions (before fatigue): 3   Fast flicks (in 10 seconds): 5   Perfect Score: Noted increased strength at level 3 of PFM however continues to demo 2/5 weakness in levels 1 and 2. HEP updated to further improve strength and to add in endurance exercises to improve deficits in endurance. pelvic floor exam consent given by patient    Pelvic exam completed: vaginally                  Precautions:   Past Medical History:   Diagnosis Date    Irregular menses     27-50 days     Miscarriage 2018    Varicella     as a child     Visual impairment      SOC: 10/02/23  POC: 1/1/2024    Daily Treatment Log:  Date 11/7/2023 11/14/2023 11/21/2023     Visit # 6 7 8 (RE)      Melissa taveras        Manual        Diastasis exam   EG see objective section               There Exer 15' 13' 20'     TA w/ SLR  Abd and flex 1x10 R/L ea. Abd and flex 2x10 R/L ea.   Flex 2x10 R/L     Active HS stretch        Sidelying clamshells  RTB 2x10  GTB 2x10 R/L  W/ PFM 2x10 R/L      Piriformis stretch  10" x5 R/L 15" x4 R/L                       Objective measures   EG HEP Updated and discussed   Updated and discussed      There Activ   10'      Pt edu   On pelvic floor anatomy, bladder irritants, bladder log, urge suppression techniques, the "knack" and necessity of 60-80 reps of PFM contractions a day to build strength                                             NMReed 40' 40' 25'     Rows w/ PFM and TA  Blue TB x15  Anika 6# 2x10       Shoulder Ext w/ PFM and TA  Blue TB x15  [place] 5# 2x10       TM 0-4% incline; 1.0-3.5 speed  1% incline to start increase by increments of 1% ea min until at 4% speed of 2mph total of 10 min 1% incline to start increase by increments of 1% ea min until at 4% speed of 2mph total of 10 min      Pallof press  Double blue TB x15 R/L  [place] 7.5# 2x10       Bird-dog + TA 1x10 R/L  1x10 R/L       Bridges and PFM  3" x15  3" x10  3" x10      Hip add w/ PFM  3" x10  3" x10  3" x10      Dead bugs + TA  1x10 R/L  1x10 R/L  1x10 R/L      TA w/ head lift  10x  10x  10x      OH carry w/ alt march 5# DB 1x10 R/L UE  5# DB 1x10 R/L UE               Modalities                                  HEP:    Access Code: [email or web address]  URL: [email or web address]  Date: 11/21/2023  Prepared by: Archie Patch    Exercises  - Supine Transversus Abdominis Bracing - Hands on Stomach  - 1 x daily - 7 x weekly - 2 sets - 10 reps  - Bird Dog  - 1 x daily - 7 x weekly - 1 sets - 10 reps  - Supine Transversus Abdominis Bracing with Leg Extension  - 1 x daily - 7 x weekly - 2 sets - 10 reps  - Abdominal Press into [place]  park  - 1 x daily - 7 x weekly - 1 sets - 10 reps - 3 sec hold  - Supine Diastasis Recti Correction with Neck Curl  - 1 x daily - 7 x weekly - 1 sets - 10 reps  - Seated Pelvic Floor Contraction with Isometric Hip Adduction  - 1 x daily - 7 x weekly - 1 sets - 10 reps - 5 sec hold  - Seated Pelvic Floor Contraction  - 1 x daily - 7 x weekly - 2 sets - 10 reps  - Supine Bridge with Pelvic Floor Contraction  - 1 x daily - 7 x weekly - 1-2 sets - 10 reps  - Sidelying Clamshell with Pelvic Floor Contraction  - 1 x daily - 7 x weekly - 1 sets - 10 reps  - Standing Pelvic Floor Contraction  - 1 x daily - 7 x weekly - 1-2 sets - 10 reps

## 2023-11-28 ENCOUNTER — OFFICE VISIT (OUTPATIENT)
Dept: PHYSICAL THERAPY | Facility: CLINIC | Age: 33
End: 2023-11-28
Payer: COMMERCIAL

## 2023-11-28 DIAGNOSIS — N81.89 PELVIC FLOOR WEAKNESS IN FEMALE: ICD-10-CM

## 2023-11-28 DIAGNOSIS — M62.08 DIASTASIS RECTI: Primary | ICD-10-CM

## 2023-11-28 PROCEDURE — 97112 NEUROMUSCULAR REEDUCATION: CPT

## 2023-11-28 PROCEDURE — 97110 THERAPEUTIC EXERCISES: CPT

## 2023-11-28 NOTE — PROGRESS NOTES
Daily Note     Today's date: 2023  Patient name: Dequan Gautam  : 1990  MRN: [id number]  Referring provider: ALEXX Morelos  Dx:   Encounter Diagnosis     ICD-10-CM    1. Diastasis recti  M62.08       2. Pelvic floor weakness in female  N81.89                      Subjective: Patient reports no concerns with updated HEP at this time       Objective: See treatment diary below      Assessment: Tolerated treatment well with improving core control however deficits and diastasis still present. Will continue to progress core and PFM strength as tolerated. Patient demonstrated fatigue post treatment, exhibited good technique with therapeutic exercises, and would benefit from continued PT      Plan: Continue per plan of care. Progress treatment as tolerated. Precautions:   Past Medical History:   Diagnosis Date    Irregular menses     27-50 days     Miscarriage 2018    Varicella     as a child     Visual impairment      SOC: 10/02/23  POC: 2024    Daily Treatment Log:  Date 2023    Visit # 6 7 8 (RE)  9     Auth         Auth exp        Manual        Diastasis exam   EG see objective section               There Exer 15' 13' 20' 15'    TA w/ SLR  Abd and flex 1x10 R/L ea. Abd and flex 2x10 R/L ea.   Flex 2x10 R/L Flex 2x10 R/L    Sidelying clamshells  RTB 2x10  GTB 2x10 R/L  W/ PFM 2x10 R/L  GTB W/ PFM 2x10 R/L     Piriformis stretch  10" x5 R/L 15" x4 R/L   15" x4 R/L                     Objective measures   EG     HEP Updated and discussed   Updated and discussed      There Activ   10'      Pt edu   On pelvic floor anatomy, bladder irritants, bladder log, urge suppression techniques, the "knack" and necessity of 60-80 reps of PFM contractions a day to build strength                                             NMReed 40' 40' 25' 40'    Rows w/ PFM and TA  Blue TB x15  [place] 6# 2x10   Uni row w/ rot and PFM 1x10 R/L     Shoulder Ext w/ PFM and TA Blue TB x15  Anika 5# 2x10   Anika 6# 1x10     TM 0-4% incline; 1.0-3.5 speed  1% incline to start increase by increments of 1% ea min until at 4% speed of 2mph total of 10 min 1% incline to start increase by increments of 1% ea min until at 4% speed of 2mph total of 10 min  4% incline with speed of 2mph total of 10 min    Pallof press  Double blue TB x15 R/L  [place] 7.5# 2x10   [place] 7.5# 2x10     Bird-dog + TA 1x10 R/L  1x10 R/L   1x10     Bridges and PFM  3" x15  3" x10  3" x10  3" x10     Hip add w/ PFM  3" x10  3" x10  3" x10      Dead bugs + TA  1x10 R/L  1x10 R/L  1x10 R/L  1x10 R/L     TA w/ head lift  10x  10x  10x  2x10     OH carry w/ alt march  5# DB 1x10 R/L UE  5# DB 1x10 R/L UE   5# DB 1x10 R/L UE     Pball press for core activation    5" x10     Modalities                                  HEP:    Access Code: [email or web address]  URL: [email or web address]  Date: 11/21/2023  Prepared by: Prosper Carpioor    Exercises  - Supine Transversus Abdominis Bracing - Hands on Stomach  - 1 x daily - 7 x weekly - 2 sets - 10 reps  - Bird Dog  - 1 x daily - 7 x weekly - 1 sets - 10 reps  - Supine Transversus Abdominis Bracing with Leg Extension  - 1 x daily - 7 x weekly - 2 sets - 10 reps  - Abdominal Press into [place]  park  - 1 x daily - 7 x weekly - 1 sets - 10 reps - 3 sec hold  - Supine Diastasis Recti Correction with Neck Curl  - 1 x daily - 7 x weekly - 1 sets - 10 reps  - Seated Pelvic Floor Contraction with Isometric Hip Adduction  - 1 x daily - 7 x weekly - 1 sets - 10 reps - 5 sec hold  - Seated Pelvic Floor Contraction  - 1 x daily - 7 x weekly - 2 sets - 10 reps  - Supine Bridge with Pelvic Floor Contraction  - 1 x daily - 7 x weekly - 1-2 sets - 10 reps  - Sidelying Clamshell with Pelvic Floor Contraction  - 1 x daily - 7 x weekly - 1 sets - 10 reps  - Standing Pelvic Floor Contraction  - 1 x daily - 7 x weekly - 1-2 sets - 10 reps

## 2023-12-08 ENCOUNTER — OFFICE VISIT (OUTPATIENT)
Dept: PHYSICAL THERAPY | Facility: CLINIC | Age: 33
End: 2023-12-08
Payer: COMMERCIAL

## 2023-12-08 DIAGNOSIS — M62.08 DIASTASIS RECTI: Primary | ICD-10-CM

## 2023-12-08 DIAGNOSIS — N81.89 PELVIC FLOOR WEAKNESS IN FEMALE: ICD-10-CM

## 2023-12-08 PROCEDURE — 97112 NEUROMUSCULAR REEDUCATION: CPT

## 2023-12-08 PROCEDURE — 97110 THERAPEUTIC EXERCISES: CPT

## 2023-12-08 NOTE — PROGRESS NOTES
Daily Note     Today's date: 2023  Patient name: Glenna Elias  : 1990  MRN: [id number]  Referring provider: ALEXX Prater  Dx:   Encounter Diagnosis     ICD-10-CM    1. Diastasis recti  M62.08       2. Pelvic floor weakness in female  N81.89                      Subjective: Patient reports no new complaints at this time. Objective: See treatment diary below      Assessment: Tolerated treatment well with improving core control noted with progressions in session, however deficits and diastasis still present. Patient edu to complete 60-80 reps of PFM strengthening a day to further improve PFM strength to lessen complains of urgency and frequency. Will continue to progress core and PFM strength as tolerated. Patient demonstrated fatigue post treatment, exhibited good technique with therapeutic exercises, and would benefit from continued PT      Plan: Continue per plan of care. Progress treatment as tolerated. Precautions:   Past Medical History:   Diagnosis Date    Irregular menses     27-50 days     Miscarriage 2018    Varicella     as a child     Visual impairment      SOC: 10/02/23  POC: 2024    Daily Treatment Log:  Date 2023   Visit # 6 7 8 (RE)  9  10   Auth         Auth exp        Manual        Diastasis exam   EG see objective section               There Exer 15' 13' 20' 15' 13'   TA w/ SLR  Abd and flex 1x10 R/L ea. Abd and flex 2x10 R/L ea.   Flex 2x10 R/L Flex 2x10 R/L 1.5# 2x10 R/L Flex    Sidelying clamshells  RTB 2x10  GTB 2x10 R/L  W/ PFM 2x10 R/L  GTB W/ PFM 2x10 R/L  GTB W/ PFM 2x10 R/L    Piriformis stretch  10" x5 R/L 15" x4 R/L   15" x4 R/L  20" x3 R/L                    Objective measures   EG     HEP Updated and discussed   Updated and discussed   Updated and discussed    There Activ   10'      Pt edu   On pelvic floor anatomy, bladder irritants, bladder log, urge suppression techniques, the "knack" and necessity of 60-80 reps of PFM contractions a day to build strength                                             NMReed 40' 40' 25' 40' 40'   Rows w/ PFM and TA  Blue TB x15  [place] 6# 2x10   Uni row w/ rot and PFM 1x10 R/L  Uni row w/ rot and TA 2x10 R/L anika 6.5#    Shoulder Ext w/ PFM and TA  Blue TB x15  [place] 5# 2x10   [place] 6# 1x10  anika 6.5# 1x15    TM 0-4% incline; 1.0-3.5 speed  1% incline to start increase by increments of 1% ea min until at 4% speed of 2mph total of 10 min 1% incline to start increase by increments of 1% ea min until at 4% speed of 2mph total of 10 min  4% incline with speed of 2mph total of 10 min 4% incline with speed of 2mph total of 10 min   Pallof press  Double blue TB x15 R/L  [place] 7.5# 2x10   [place] 7.5# 2x10  Half kneel on foam [place] 7.5# 1x10 R/L    Bird-dog + TA 1x10 R/L  1x10 R/L   1x10  1x10    Bridges and PFM  3" x15  3" x10  3" x10  3" x10  On pball 3" x10    Hip add w/ PFM  3" x10  3" x10  3" x10   5" x10    Dead bugs + TA  1x10 R/L  1x10 R/L  1x10 R/L  1x10 R/L  1x10 R/L    TA w/ head lift  10x  10x  10x  2x10  2x10    OH carry w/ alt march 5# DB 1x10 R/L UE  5# DB 1x10 R/L UE   5# DB 1x10 R/L UE  7# DB 1x10 R/L UE    Pball press for core activation    5" x10  Supine 5" x10    STS taps w/ PFM      1x10 to slightly elevated mat            Modalities                                  HEP:    Access Code: [email or web address]  URL: [email or web address]  Date: 12/08/2023  Prepared by: Vena Phenes    Exercises  - Supine Transversus Abdominis Bracing - Hands on Stomach  - 1 x daily - 7 x weekly - 2 sets - 10 reps  - Bird Dog  - 1 x daily - 7 x weekly - 1 sets - 10 reps  - Dead Bug  - 1 x daily - 7 x weekly - 1 sets - 10 reps  - Supine Active Straight Leg Raise  - 1 x daily - 7 x weekly - 2 sets - 10 reps  - Abdominal Press into [place]  - 1 x daily - 7 x weekly - 1 sets - 10 reps - 3 sec hold  - Supine Diastasis Recti Correction with Neck Curl  - 1 x daily - 7 x weekly - 1 sets - 10 reps  - Seated Pelvic Floor Contraction with Isometric Hip Adduction  - 1 x daily - 7 x weekly - 1 sets - 10 reps - 5 sec hold  - Seated Pelvic Floor Contraction  - 1 x daily - 7 x weekly - 1 sets - 10 reps  - Supine Bridge with Pelvic Floor Contraction  - 1 x daily - 7 x weekly - 1 sets - 10 reps  - Sidelying Clamshell with Pelvic Floor Contraction  - 1 x daily - 7 x weekly - 1 sets - 10 reps  - Standing Pelvic Floor Contraction  - 1 x daily - 7 x weekly - 1-2 sets - 10 reps  - Sit to Stand with Pelvic Floor Contraction  - 1 x daily - 7 x weekly - 1 sets - 10 reps

## 2023-12-12 ENCOUNTER — APPOINTMENT (OUTPATIENT)
Dept: PHYSICAL THERAPY | Facility: CLINIC | Age: 33
End: 2023-12-12
Payer: COMMERCIAL

## 2023-12-14 ENCOUNTER — OFFICE VISIT (OUTPATIENT)
Dept: PHYSICAL THERAPY | Facility: CLINIC | Age: 33
End: 2023-12-14
Payer: COMMERCIAL

## 2023-12-14 DIAGNOSIS — N81.89 PELVIC FLOOR WEAKNESS IN FEMALE: ICD-10-CM

## 2023-12-14 DIAGNOSIS — M62.08 DIASTASIS RECTI: Primary | ICD-10-CM

## 2023-12-14 PROCEDURE — 97112 NEUROMUSCULAR REEDUCATION: CPT

## 2023-12-14 PROCEDURE — 97110 THERAPEUTIC EXERCISES: CPT

## 2023-12-14 NOTE — PROGRESS NOTES
Daily Note     Today's date: 2023  Patient name: Rocky Patel  : 1990  MRN: [id number]  Referring provider: ALEXX Ortega  Dx:   Encounter Diagnosis     ICD-10-CM    1. Diastasis recti  M62.08       2. Pelvic floor weakness in female  N81.89                      Subjective: Patient reports she feels like she has made good improvements and feels confident she can D/C to HEP at end of month      Objective: See treatment diary below      Assessment: Tolerated treatment well and discussed D/C at end of month with patient continuing with HEP to further improve PFM and core strength. Patient feels confident she can continue with HEP. Patient demonstrated fatigue post treatment, exhibited good technique with therapeutic exercises, and would benefit from continued PT until D/C at end of month      Plan: Continue per plan of care. Progress treatment as tolerated.        Precautions:   Past Medical History:   Diagnosis Date    Irregular menses     27-50 days     Miscarriage 2018    Varicella     as a child     Visual impairment      SOC: 10/02/23  POC: 2024    Daily Treatment Log:  Date 2023   Visit # 11   9  10   Auth         Auth exp        Manual        Diastasis exam                 There Exer 15'   15' 13'   TA w/ SLR  1.5# 2x10 R/L Flex    Flex 2x10 R/L 1.5# 2x10 R/L Flex    Sidelying clamshells  GTB W/ PFM 2x10 R/L    GTB W/ PFM 2x10 R/L  GTB W/ PFM 2x10 R/L    Piriformis stretch  20" x3 R/L    15" x4 R/L  20" x3 R/L                    Objective measures        HEP     Updated and discussed    There Activ        Pt edu                                                NMReed 25'   40' 40'   Rows w/ PFM and TA     Uni row w/ rot and PFM 1x10 R/L  Uni row w/ rot and TA 2x10 R/L anika 6.5#    Shoulder Ext w/ PFM and TA  anika 7# 2x10    [place] 6# 1x10  anika 6.5# 1x15    TM incline walking  4% incline with speed of 2mph total of 7 min   4% incline with speed of 2mph total of 10 min 4% incline with speed of 2mph total of 10 min   Pallof press  Half kneel on foam [place] 7.5# 1x10 R/L    [place] 7.5# 2x10  Half kneel on foam [place] 7.5# 1x10 R/L    Bird-dog + TA    1x10  1x10    Bridges and PFM  On pball 3" 1x10    3" x10  On pball 3" x10    Hip add w/ PFM  5" x10     5" x10    Dead bugs + TA  1x10 R/L    1x10 R/L  1x10 R/L    TA w/ head lift  2x10    2x10  2x10    OH carry w/ alt march 5# DB 1x10 R/L UE  7# DB 1x10 R/L UE    Pball press for core activation Supine 5" x10    5" x10  Supine 5" x10    STS taps w/ PFM  1x10 low mat     1x10 to slightly elevated mat            Modalities                                  HEP:    Access Code: [email or web address]  URL: [email or web address]  Date: 12/08/2023  Prepared by: Margaret Goad    Exercises  - Supine Transversus Abdominis Bracing - Hands on Stomach  - 1 x daily - 7 x weekly - 2 sets - 10 reps  - Bird Dog  - 1 x daily - 7 x weekly - 1 sets - 10 reps  - Dead Bug  - 1 x daily - 7 x weekly - 1 sets - 10 reps  - Supine Active Straight Leg Raise  - 1 x daily - 7 x weekly - 2 sets - 10 reps  - Abdominal Press into [place]  park  - 1 x daily - 7 x weekly - 1 sets - 10 reps - 3 sec hold  - Supine Diastasis Recti Correction with Neck Curl  - 1 x daily - 7 x weekly - 1 sets - 10 reps  - Seated Pelvic Floor Contraction with Isometric Hip Adduction  - 1 x daily - 7 x weekly - 1 sets - 10 reps - 5 sec hold  - Seated Pelvic Floor Contraction  - 1 x daily - 7 x weekly - 1 sets - 10 reps  - Supine Bridge with Pelvic Floor Contraction  - 1 x daily - 7 x weekly - 1 sets - 10 reps  - Sidelying Clamshell with Pelvic Floor Contraction  - 1 x daily - 7 x weekly - 1 sets - 10 reps  - Standing Pelvic Floor Contraction  - 1 x daily - 7 x weekly - 1-2 sets - 10 reps  - Sit to Stand with Pelvic Floor Contraction  - 1 x daily - 7 x weekly - 1 sets - 10 reps

## 2023-12-19 ENCOUNTER — APPOINTMENT (OUTPATIENT)
Dept: PHYSICAL THERAPY | Facility: CLINIC | Age: 33
End: 2023-12-19
Payer: COMMERCIAL

## 2023-12-19 ENCOUNTER — TELEPHONE (OUTPATIENT)
Dept: PHYSICAL THERAPY | Facility: CLINIC | Age: 33
End: 2023-12-19

## 2023-12-19 NOTE — TELEPHONE ENCOUNTER
patient left message this morning -  has the flu and she woke with a scratchy throat this morning will not be attending todays appt.     Dee Graham PT, DPT   License #  [id number]

## 2023-12-27 ENCOUNTER — APPOINTMENT (OUTPATIENT)
Dept: PHYSICAL THERAPY | Facility: CLINIC | Age: 33
End: 2023-12-27
Payer: COMMERCIAL

## 2024-01-02 ENCOUNTER — OFFICE VISIT (OUTPATIENT)
Dept: PHYSICAL THERAPY | Facility: CLINIC | Age: 34
End: 2024-01-02
Payer: COMMERCIAL

## 2024-01-02 DIAGNOSIS — N81.89 PELVIC FLOOR WEAKNESS IN FEMALE: ICD-10-CM

## 2024-01-02 DIAGNOSIS — M62.08 DIASTASIS RECTI: Primary | ICD-10-CM

## 2024-01-02 PROCEDURE — 97110 THERAPEUTIC EXERCISES: CPT

## 2024-01-02 NOTE — PROGRESS NOTES
PT Re-Evaluation  and PT Discharge    Today's date: 2024  Patient name: Genevieve Lyles  : 1990  MRN: [id number]  Referring provider: Rhina Howell CRNP  Dx:   Encounter Diagnosis     ICD-10-CM    1. Diastasis recti  M62.08       2. Pelvic floor weakness in female  N81.89             Start Time: 1700  Stop Time: 1730  Total time in clinic (min): 30 minutes    Assessment  Assessment details: Genevieve Lyles is a 33 y.o. year old female who reports 80% improvement in urge and frequency symptoms as well as diastasis, with minimal deficits still present. Genevieve is no longer limited in the following activities: core exercises, increase urgency, recreational activities that require heavy lifting, yoga, and piliates. Patient was educated on updated HEP with plan to D/C to HEP at this time.          Impairments: impaired physical strength  Understanding of Dx/Px/POC: good   Prognosis: good    Goals  STG: to be met in 8 weeks   -The patient will improve pelvic floor muscle strength 1 to 2 grade to decrease urgency and incontinence. --MET  -The patient will improve pelvic floor muscle endurance to 2 to 3 seconds to improve PFM activation with higher level activities and decrease incontinence --- MET  -The patient will reduce diastasis of the rectus abdominis by 1/2 to 1 finger widths ---MET      LTG: to be met in 12 weeks: progressing towards all  1.The patient will be independent with HEP upon discharge. ---MET  2. The patient will perform higher level activities and exercise without incontinence upon discharge. ---MET  3.  The patient will be able participate in all recreational core related activities that she was able to do prior to pregnancy with no discomfort. ---MET    Plan  Plan details: Patient to D/C to HEP at this time  Planned therapy interventions: home exercise program and patient education  Plan of Care beginning date: 10/2/2023  Plan of Care expiration date: 2024        PT  "Pelvic Floor Subjective:   History of Present Illness:   Pt reports to PT for re-eval of PFM weakness and diastasis recti. Patient reports about 80% improvement in symptoms. Patient reports she still has some limitations however she feels she can continue to work on these with HEP. Patient notes improvements in urgency and frequency of urination. Mechanism of injury: childbirth          Not a recurrent problem   Quality of life: good    Social Support:     Lives in:  Multiple-level home    Lives with:  Spouse    Relationship status: /committed    Work status: unemployed    Life stress level: 6    History of Depression: no  Hand dominance:  Right  Diet and Exercise:    Diet:balanced nutrition    Exercise type: walking and combination activity    15-20 min    Exercise frequency: 2-3 times per week  OB/ gyn History    Gestational History:     Prior Pregnancy: Yes      Number of prior pregnancies: 2    Number of term pregnancies: 2    Delivery Type: vaginal delivery and  section      Menstrual History:    Date of last menstrual period: 2023    Menstrual irregularities irregular menses    Painful periods:  Difficulty managing menstrual pain    Tolerates tampons: yes    Birth control: no contraception    Birth control method: condoms  Bladder Function:     Voiding Difficulties positive for: urgency and frequent urination       Voiding Difficulties comments:     Voiding frequency: every 1-2 hours    Urinary leakage: no urine leakage    Nocturia (episodes per night): 1    Painful urination: No      Fluid Intake Type:  Coffee and water    Intake (ounces): Water: 64, Coffee: 16,   Incontinence Management:     Pads/Diaper Use:  None  Bowel Function:     Bowel frequency: daily    [place] Stool Scale: type 3 and type 4    Stool softener use: no stool softeners    Enema use: no enema    Uses \"squatty potty\": no Squatty Potty  Sexual Function:     Pain during intercourse: No      Lubrication Use: Yesnot able to " "acheive orgasm  Pain:     No pain reported by patient.  Treatments:     Previous treatment:  Physical therapy    Current treatment: physical therapy        Objective       Abdominal Assessment:      Position: supine exam    Diastatis   Diastasis recti present: yes  3\" above umbilicus (# fingers): 1  Umbilicus (# fingers): 1  3\" below umbilicus (# fingers): 0.5  Connective tissue integrity at linea alba: firm  no tenderness at linea alba  able to engage transverse abdominis     Skin inspection:   scars present.   Number of scars: 1  Scar 1 location: above pubic bone . Sensitivity level low Restriction level low     Visual Inspection of Perineum:   Excursion of perineal body in cephalad direction with contraction of pelvic floor muscles (PFM): good  Excursion of perineal body in caudal direction with relaxation of pelvic floor muscles (PFM): good   Involuntary contraction with coughing: yes  Cotton swab test: non-tender  Cough reflex: cough reflex  Sphincter Tone Resting: normal  Sphincter Tone Squeeze: normal  Sensation: intact    Pelvic Organ Prolapse   no pelvic organ prolapse  Position: hook-lying  With bearing down: none  Perineal body inspection: within normal limits        Pelvic Floor Muscle Exam:      Breathing pattern with contraction: within normal limits   Pelvic floor muscle relaxation is complete.         PERFECT Score   Power right: 4/5   Power left: 4/5   Endurance (seconds to max): 7   Repetitions (before fatigue): 4   Fast flicks (in 10 seconds): 7   Perfect Score: Noted increased strength at all levels of pelvic floor with 4/5 strength noted and symmetrical compression on examiner finger.       pelvic floor exam consent given by patient    Pelvic exam completed: vaginally                  Precautions:   Past Medical History:   Diagnosis Date    Irregular menses     27-50 days     Miscarriage 2018    Varicella     as a child     Visual impairment      SOC: 10/02/23  POC: 1/1/2024    Daily Treatment " "Log:  Date 12/14/2023 1/2/2023 12/8/2023   Visit # 11 12   10   Auth         Auth exp        Manual        Diastasis exam                 There Exer 15' 30'    13'   TA w/ SLR  1.5# 2x10 R/L Flex     1.5# 2x10 R/L Flex    Sidelying clamshells  GTB W/ PFM 2x10 R/L     GTB W/ PFM 2x10 R/L    Piriformis stretch  20\" x3 R/L     20\" x3 R/L                    Objective measures  EG      HEP  Updated and discussed    Updated and discussed    There Activ        Pt edu                                                NMReed 25'    40'   Rows w/ PFM and TA      Uni row w/ rot and TA 2x10 R/L anika 6.5#    Shoulder Ext w/ PFM and TA  anika 7# 2x10     anika 6.5# 1x15    TM incline walking  4% incline with speed of 2mph total of 7 min    4% incline with speed of 2mph total of 10 min   Pallof press  Half kneel on foam [place] 7.5# 1x10 R/L     Half kneel on foam Anika 7.5# 1x10 R/L    Bird-dog + TA     1x10    Bridges and PFM  On pball 3\" 1x10     On pball 3\" x10    Hip add w/ PFM  5\" x10     5\" x10    Dead bugs + TA  1x10 R/L     1x10 R/L    TA w/ head lift  2x10     2x10    OH carry w/ alt march      7# DB 1x10 R/L UE    Pball press for core activation Supine 5\" x10     Supine 5\" x10    STS taps w/ PFM  1x10 low mat     1x10 to slightly elevated mat            Modalities                                  HEP:    Access Code: [email or web address]  URL: [email or web address]  Date: 12/08/2023  Prepared by: Dee Graham    Exercises  - Supine Transversus Abdominis Bracing - Hands on Stomach  - 1 x daily - 7 x weekly - 2 sets - 10 reps  - Bird Dog  - 1 x daily - 7 x weekly - 1 sets - 10 reps  - Dead Bug  - 1 x daily - 7 x weekly - 1 sets - 10 reps  - Supine Active Straight Leg Raise  - 1 x daily - 7 x weekly - 2 sets - 10 reps  - Abdominal Press into Ball  - 1 x daily - 7 x weekly - 1 sets - 10 reps - 3 sec hold  - Supine Diastasis Recti Correction with Neck Curl  - 1 x daily - 7 x weekly - 1 sets - 10 reps  - Seated Pelvic " Floor Contraction with Isometric Hip Adduction  - 1 x daily - 7 x weekly - 1 sets - 10 reps - 5 sec hold  - Seated Pelvic Floor Contraction  - 1 x daily - 7 x weekly - 1 sets - 10 reps  - Supine Bridge with Pelvic Floor Contraction  - 1 x daily - 7 x weekly - 1 sets - 10 reps  - Sidelying Clamshell with Pelvic Floor Contraction  - 1 x daily - 7 x weekly - 1 sets - 10 reps  - Standing Pelvic Floor Contraction  - 1 x daily - 7 x weekly - 1-2 sets - 10 reps  - Sit to Stand with Pelvic Floor Contraction  - 1 x daily - 7 x weekly - 1 sets - 10 reps

## 2024-09-23 ENCOUNTER — ULTRASOUND (OUTPATIENT)
Dept: OBGYN CLINIC | Facility: MEDICAL CENTER | Age: 34
End: 2024-09-23
Payer: COMMERCIAL

## 2024-09-23 VITALS
BODY MASS INDEX: 26.52 KG/M2 | SYSTOLIC BLOOD PRESSURE: 130 MMHG | HEIGHT: 68 IN | DIASTOLIC BLOOD PRESSURE: 82 MMHG | WEIGHT: 175 LBS

## 2024-09-23 DIAGNOSIS — Z36.89 ESTABLISH GESTATIONAL AGE, ULTRASOUND: ICD-10-CM

## 2024-09-23 DIAGNOSIS — Z3A.10 10 WEEKS GESTATION OF PREGNANCY: ICD-10-CM

## 2024-09-23 DIAGNOSIS — O34.219 PREVIOUS CESAREAN DELIVERY AFFECTING PREGNANCY: ICD-10-CM

## 2024-09-23 DIAGNOSIS — N91.2 AMENORRHEA: Primary | ICD-10-CM

## 2024-09-23 PROCEDURE — 99213 OFFICE O/P EST LOW 20 MIN: CPT | Performed by: CLINICAL NURSE SPECIALIST

## 2024-09-23 PROCEDURE — 76817 TRANSVAGINAL US OBSTETRIC: CPT | Performed by: CLINICAL NURSE SPECIALIST

## 2024-09-23 PROCEDURE — 76801 OB US < 14 WKS SINGLE FETUS: CPT | Performed by: CLINICAL NURSE SPECIALIST

## 2024-09-23 NOTE — PROGRESS NOTES
"   Subjective  Patient ID: Genevieve Lyles is a 34 y.o. female here for Pregnancy Ultrasound (Lmp 24/Ania 25/11w1d/Patient is having nausea /Pregnancy is planned /Periods are irregular  )    Newly Pregnant  Patient's last menstrual period was 2024. giving her an ANIA of 25 and a gestational age of  11 weeks 1 days (based on LMP)    Menstrual cycle: irregular, cycle length:  30-50 days  Pregnancy was planned.   She has started taking a prenatal vitamin    She is C/O amenorrhea. Also having some nausea with occasional vomitting.   Denies bleeding or cramping        OB History    Para Term  AB Living   4 2 2 0 1 2   SAB IAB Ectopic Multiple Live Births   1 0 0 0 2      # Outcome Date GA Lbr Nish/2nd Weight Sex Type Anes PTL Lv   4 Current            3 Term 05/10/22 40w3d  3940 g (8 lb 11 oz) M CS-LTranv   DINAH   2 Term 20 41w1d / 00:57 4160 g (9 lb 2.7 oz) M Vag-Spont EPI  DINAH   1 SAB 2018 7w0d               The following portions of the patient's history were reviewed and updated as appropriate: allergies, current medications, past family history, past medical history, past social history, past surgical history, and problem list.    Perinent hx that may affect pregnancy:  Prev C/S-breech prior to c/s, then was cephalic at delivery       Review of Systems    See HPI for pertinent positives.             /82 (BP Location: Left arm, Patient Position: Sitting, Cuff Size: Standard)   Ht 5' 8\" (1.727 m)   Wt 79.4 kg (175 lb)   LMP 2024   BMI 26.61 kg/m²   OBGyn Exam      FIRST TRIMESTER OBSTETRIC ULTRASOUND     Patient's last menstrual period was 2024.    INDICATION: Establish Gestational Age       FINDINGS:  See imaging report for details     Additional Findings: none  Though T/A and T/V probe used to capture adequate images.     FHR: 157 by p/w- as unable to capture in M-mode    IMPRESSION:    Single intrauterine pregnancy of 10 weeks 6days " gestational age  Fetal cardiac activity detected.  No adnexal masses seen.  EDC by LMP: 25  EDC by this Ultrasound: 4/15/25    Assigning a Final ANIA  GA is close to GA by LMP, however EDC by US due to hx of variable menses q 30-50d.       ALEXX Ramos   [facility] -[facility] [place]  [address]  [address]  [place] [address]  Dept: [phone]  Dept Fax: [phone]  Loc: [phone]  Loc Fax: [phone]  Ultrasound Probe Disinfection    A transvaginal ultrasound was performed.   Prior to use, disinfection was performed with High Level Disinfection Process ([address]).  Probe serial number: [id number] was used.                    Assessment/Plan:         1. Amenorrhea  Assessment & Plan:  She is a  with Patient's last menstrual period was 2024. Ultrasound today is showing a viable IUP of 10w 6d. Due to hx of very irregular menses q 30-50 days,  final EDC by US  - 4/15/25.  Has hx of Prior c/s.  Briefly reviewed increased risk of uterine rupture. Wouldlike TOLAC. Referral to [facility] given for routine US. F/U for OB intake and then Initial Prenatal Exam.    Orders:  -     AMB US OB < 14 weeks single or first gestation level 1  2. Establish gestational age, ultrasound  -     AMB US OB < 14 weeks single or first gestation level 1  3. 10 weeks gestation of pregnancy  -     Ambulatory Referral to Maternal Fetal Medicine; Future; Expected date: 10/07/2024  4. Previous  delivery affecting pregnancy      Orders Placed This Encounter   Procedures    Ambulatory Referral to Maternal Fetal Medicine    AMB US OB < 14 weeks single or first gestation level 1

## 2024-09-23 NOTE — ASSESSMENT & PLAN NOTE
She is a  with Patient's last menstrual period was 2024. Ultrasound today is showing a viable IUP of 10w 6d. Due to hx of very irregular menses q 30-50 days,  final EDC by US  - 4/15/25.  Has hx of Prior c/s.  Briefly reviewed increased risk of uterine rupture. Wouldlike TOLAC. Referral to [facility] given for routine US. F/U for OB intake and then Initial Prenatal Exam.

## 2024-09-27 ENCOUNTER — INITIAL PRENATAL (OUTPATIENT)
Dept: OBGYN CLINIC | Facility: CLINIC | Age: 34
End: 2024-09-27

## 2024-09-27 VITALS — WEIGHT: 175 LBS | HEIGHT: 68 IN | BODY MASS INDEX: 26.52 KG/M2

## 2024-09-27 DIAGNOSIS — Z36.9 ENCOUNTER FOR ANTENATAL SCREENING: ICD-10-CM

## 2024-09-27 DIAGNOSIS — Z31.430 ENCOUNTER OF FEMALE FOR TESTING FOR GENETIC DISEASE CARRIER STATUS FOR PROCREATIVE MANAGEMENT: ICD-10-CM

## 2024-09-27 DIAGNOSIS — Z34.81 PRENATAL CARE, SUBSEQUENT PREGNANCY, FIRST TRIMESTER: Primary | ICD-10-CM

## 2024-09-27 PROCEDURE — OBC

## 2024-09-27 RX ORDER — MULTIVIT WITH MINERALS/LUTEIN
500 TABLET ORAL DAILY
COMMUNITY

## 2024-09-27 NOTE — PROGRESS NOTES
OB INTAKE INTERVIEW  Patient is 34 y.o. who presents for OB intake at 11 3/7 wks  She is accompanied by  during this encounter  The father of her baby Nathaniel Lyles is involved in the pregnancy and is 38 years old.      Last Menstrual Period: 2024  Ultrasound: Measured 10 weeks 6 days on 24  Estimated Date of Delivery: 4/15/2025 confirmed by 10 week US    Signs/Symptoms of Pregnancy  Current pregnancy symptoms: Tired and nausea  Constipation no  Headaches YES-occasional  Cramping/spotting no  PICA cravings no    Diabetes-  Body mass index is 26.61 kg/m².  If patient has 1 or more, please order early 1 hour GTT  History of GDM no  BMI >35 no  History of PCOS or current metformin use NO  History of LGA/macrosomic infant (4000g/9lbs) YES    If patient has 2 or more, please order early 1 hour GTT  BMI>30 no  AMA no  First degree relative with type 2 diabetes no  History of chronic HTN, hyperlipidemia, elevated A1C no  High risk race (, , ,  or ) no    Hypertension- if you answer yes to any of the following, please order baseline preeclampsia labs (cbc, comprehensive metabolic panel, urine protein creatinine ratio, uric acid)  History of of chronic HTN no  History of gestational HTN no  History of preeclampsia, eclampsia, or HELLP syndrome no  History of diabetes no  History of lupus, autoimmune disease, kidney disease no    Thyroid- if yes order TSH with reflex T4  History of thyroid disease no    Bleeding Disorder or Hx of DVT-patient or first degree relative with history of. Order the following if not done previously.   (Factor V, antithrombin III, prothrombin gene mutation, protein C and S Ag, lupus anticoagulant, anticardiolipin, beta-2 glycoprotein)   no    OB/GYN-  History of abnormal pap smear no       Date of last pap smear 22  History of HPV no  History of Herpes/HSV no  History of other STI (gonorrhea, chlamydia, trich)  no  History of prior  YES  History of prior  YES  History of  delivery prior to 36 weeks 6 days no  History of Varicella or Vaccination Had as a child  History of blood transfusion no  Ok for blood transfusion YES    Substance screening-   History of tobacco use no  Currently using tobacco no  Substance Use Screen Level (N/A, LOW, HIGH) N/A    MRSA Screening-   Does the pt have a hx of MRSA? no    Immunizations:  Influenza vaccine given this season Willing when available  Discussed Tdap vaccine YES  Discussed COVID Vaccine YES    Genetic/[facility]-  Do you or your partner have a history of any of the following in yourselves or first degree relatives?  Cystic fibrosis no  Spinal muscular atrophy no  Hemoglobinopathy/Sickle Cell/Thalassemia no  Fragile X Intellectual Disability no      If no, discuss Carrier Screening being completed once in a lifetime as a standard of care lab test. Place orders for Cystic Fibrosis Gene Test (ARP998) and Spinal Muscular Atrophy DNA ([id number])      Appointment for Nuchal Translucency Ultrasound at [facility] scheduled for 10/1/24      Interview education  [facility] Pregnancy Essentials Book reviewed, discussed and attached to their AVS YES    Nurse/Family Partnership- patient may qualify NO; referral placed NO    Prenatal lab work scripts YES  Extra labs ordered:  GTT-1h  Genetic screening    Aspirin/Preeclampsia Screen    Risk Level Risk Factor Recommendation   LOW Prior Uncomplicated full-term delivery YES No Aspirin recommendation        MODERATE Nulliparity no Recommend low-dose aspirin if     BMI>30 no 2 or more moderate risk factors    Family History Preeclampsia (mother/sister) no     35yr old or greater no     Black Race, Concern for SDOH/Low Socioeconomic no     IVF Pregnancy  no     Personal History Risks (low birth weight, prior adverse preg outcome, >10yr preg interval) no         HIGH History of Preeclampsia no Recommend low-dose aspirin if     Multifetal gestation  "no 1 or more high risk factors    Chronic HTN no     Type 1 or 2 Diabetes no     Renal Disease no     Autoimmune Disease  no      Contraindications to ASA therapy:  NSAID/ ASA allergy: no  Nasal polyps: no  Asthma with history of ASA induced bronchospasm: no  Relative contraindications:  History of GI bleed: no  Active peptic ulcer disease: no  Severe hepatic dysfunction: no    Patient should be recommended to take ASA 162mg during this pregnancy from 12-36wks to lower her risk of preeclampsia: N/A      The patient has a history now or in prior pregnancy notable for:   in  for LGA (9lb 2.7 oz), Csection in  for breech.      Details that I feel the provider should be aware of: This ia a planned and welcomed pregnancy for parents. Patient is experiencing some nausea. Discussed OTC antiemetics and diet. Patient's first pregnancy was a  with an LGA baby no complications. Patient's second pregnancy was a  for breech however, Pt states \"when they did the  they found that the baby flipped into the right position\" Pt states she had to do the 3 hour GTT and passed. Pt did not have GDM during her pregnancies.  Pt knows when to call Doctor and how to contact her nurse navigator. Patient verbalized understanding.Patient knows to have lab orders completed before next appointment.          PN1 visit scheduled. The patient was oriented to our practice, the navigator role, reviewed delivering physicians and [facility] for Delivery. All questions were answered.    Interviewed by: Brittany Su RN     "

## 2024-09-30 PROBLEM — Z3A.12 12 WEEKS GESTATION OF PREGNANCY: Status: ACTIVE | Noted: 2024-09-23

## 2024-09-30 PROBLEM — N91.2 AMENORRHEA: Status: RESOLVED | Noted: 2024-09-23 | Resolved: 2024-09-30

## 2024-10-01 ENCOUNTER — ROUTINE PRENATAL (OUTPATIENT)
Dept: PERINATAL CARE | Facility: OTHER | Age: 34
End: 2024-10-01
Payer: COMMERCIAL

## 2024-10-01 VITALS
HEIGHT: 68 IN | HEART RATE: 96 BPM | WEIGHT: 176 LBS | DIASTOLIC BLOOD PRESSURE: 76 MMHG | BODY MASS INDEX: 26.67 KG/M2 | SYSTOLIC BLOOD PRESSURE: 112 MMHG

## 2024-10-01 DIAGNOSIS — O34.219 PREVIOUS CESAREAN DELIVERY AFFECTING PREGNANCY: ICD-10-CM

## 2024-10-01 DIAGNOSIS — Z3A.10 10 WEEKS GESTATION OF PREGNANCY: ICD-10-CM

## 2024-10-01 DIAGNOSIS — Z3A.12 12 WEEKS GESTATION OF PREGNANCY: Primary | ICD-10-CM

## 2024-10-01 DIAGNOSIS — Z36.82 ENCOUNTER FOR (NT) NUCHAL TRANSLUCENCY SCAN: ICD-10-CM

## 2024-10-01 PROCEDURE — 99242 OFF/OP CONSLTJ NEW/EST SF 20: CPT | Performed by: OBSTETRICS & GYNECOLOGY

## 2024-10-01 PROCEDURE — 76813 OB US NUCHAL MEAS 1 GEST: CPT | Performed by: OBSTETRICS & GYNECOLOGY

## 2024-10-01 PROCEDURE — 76801 OB US < 14 WKS SINGLE FETUS: CPT | Performed by: OBSTETRICS & GYNECOLOGY

## 2024-10-01 NOTE — PROGRESS NOTES
"[facility]: Ms. Lyles was seen today at 12w0d for nuchal translucency ultrasound.  See ultrasound report under \"OB Procedures\" tab.      My recommendations are as follows:  We reviewed the availability of aneuploidy screening, as well as diagnostic testing, which are available to all pregnant women. We reviewed limitations, risks, and benefits of screening and testing. She elected to proceed with Non Invasive Prenatal Screening (NIPS code 274663), but prefers to contact her insurance to obtain further information regarding cost prior to having this ordered. She will call our office to make an appointment for blood work once she knows how she would like to proceed. MSAFP screening should be ordered through your office at 15-20 weeks gestation, and completed prior to fetal anatomic survey. She does not wish to pursue diagnostic testing at this time. A detailed anatomic survey as well as transvaginal cervical length screening are recommended around 20 weeks gestation.  Please offer carrier screening per ACOG guidelines.     Please don't hesitate to contact our office with any concerns or questions.    -Devika Romano MD  "

## 2024-10-01 NOTE — PROGRESS NOTES
Patient chose to have [facility] [id number] Non-Invasive Prenatal Screen [id number] PLUS w/ SCA, WITH fetal sex.    Patient plans to call her insurance to verify that the testing is covered and determine if there would be any out of pocket costs, and then will call back to schedule a nurse visit for the blood draw. She was given CPT codes and the phone number for the office to call and schedule this appointment ([phone]).    Patient given brochure and is aware [facility] will contact patient's insurance and coordinate coverage.  Provided [facility] contact information. General inquiries [phone], Cost estimates [phone] and [facility] Billing [phone]. Website [email or web address].

## 2024-10-07 ENCOUNTER — LAB (OUTPATIENT)
Dept: LAB | Facility: CLINIC | Age: 34
End: 2024-10-07
Payer: COMMERCIAL

## 2024-10-07 DIAGNOSIS — Z36.9 ENCOUNTER FOR ANTENATAL SCREENING: ICD-10-CM

## 2024-10-07 DIAGNOSIS — Z34.81 PRENATAL CARE, SUBSEQUENT PREGNANCY, FIRST TRIMESTER: ICD-10-CM

## 2024-10-07 DIAGNOSIS — Z31.430 ENCOUNTER OF FEMALE FOR TESTING FOR GENETIC DISEASE CARRIER STATUS FOR PROCREATIVE MANAGEMENT: ICD-10-CM

## 2024-10-07 LAB
ABO GROUP BLD: NORMAL
BACTERIA UR QL AUTO: NORMAL /HPF
BASOPHILS # BLD AUTO: 0.02 THOUSANDS/ΜL (ref 0–0.1)
BASOPHILS NFR BLD AUTO: 0 % (ref 0–1)
BILIRUB UR QL STRIP: NEGATIVE
BLD GP AB SCN SERPL QL: NEGATIVE
CLARITY UR: CLEAR
COLOR UR: COLORLESS
EOSINOPHIL # BLD AUTO: 0.02 THOUSAND/ΜL (ref 0–0.61)
EOSINOPHIL NFR BLD AUTO: 0 % (ref 0–6)
ERYTHROCYTE [DISTWIDTH] IN BLOOD BY AUTOMATED COUNT: 13.4 % (ref 11.6–15.1)
GLUCOSE 1H P 50 G GLC PO SERPL-MCNC: 106 MG/DL (ref 70–134)
GLUCOSE UR STRIP-MCNC: NEGATIVE MG/DL
HBV SURFACE AG SER QL: NORMAL
HCT VFR BLD AUTO: 38.8 % (ref 34.8–46.1)
HCV AB SER QL: NORMAL
HGB BLD-MCNC: 13.1 G/DL (ref 11.5–15.4)
HGB UR QL STRIP.AUTO: NEGATIVE
HIV 1+2 AB+HIV1 P24 AG SERPL QL IA: NORMAL
HIV 2 AB SERPL QL IA: NORMAL
HIV1 AB SERPL QL IA: NORMAL
HIV1 P24 AG SERPL QL IA: NORMAL
IMM GRANULOCYTES # BLD AUTO: 0.03 THOUSAND/UL (ref 0–0.2)
IMM GRANULOCYTES NFR BLD AUTO: 1 % (ref 0–2)
KETONES UR STRIP-MCNC: NEGATIVE MG/DL
LEUKOCYTE ESTERASE UR QL STRIP: NEGATIVE
LYMPHOCYTES # BLD AUTO: 1.32 THOUSANDS/ΜL (ref 0.6–4.47)
LYMPHOCYTES NFR BLD AUTO: 22 % (ref 14–44)
MCH RBC QN AUTO: 29.6 PG (ref 26.8–34.3)
MCHC RBC AUTO-ENTMCNC: 33.8 G/DL (ref 31.4–37.4)
MCV RBC AUTO: 88 FL (ref 82–98)
MONOCYTES # BLD AUTO: 0.35 THOUSAND/ΜL (ref 0.17–1.22)
MONOCYTES NFR BLD AUTO: 6 % (ref 4–12)
NEUTROPHILS # BLD AUTO: 4.15 THOUSANDS/ΜL (ref 1.85–7.62)
NEUTS SEG NFR BLD AUTO: 71 % (ref 43–75)
NITRITE UR QL STRIP: NEGATIVE
NON-SQ EPI CELLS URNS QL MICRO: NORMAL /HPF
NRBC BLD AUTO-RTO: 0 /100 WBCS
PH UR STRIP.AUTO: 6.5 [PH]
PLATELET # BLD AUTO: 201 THOUSANDS/UL (ref 149–390)
PMV BLD AUTO: 9.9 FL (ref 8.9–12.7)
PROT UR STRIP-MCNC: NEGATIVE MG/DL
RBC # BLD AUTO: 4.42 MILLION/UL (ref 3.81–5.12)
RBC #/AREA URNS AUTO: NORMAL /HPF
RH BLD: POSITIVE
RUBV IGG SERPL IA-ACNC: 35.2 IU/ML
SP GR UR STRIP.AUTO: 1 (ref 1–1.03)
SPECIMEN EXPIRATION DATE: NORMAL
TREPONEMA PALLIDUM IGG+IGM AB [PRESENCE] IN SERUM OR PLASMA BY IMMUNOASSAY: NORMAL
UROBILINOGEN UR STRIP-ACNC: <2 MG/DL
WBC # BLD AUTO: 5.89 THOUSAND/UL (ref 4.31–10.16)
WBC #/AREA URNS AUTO: NORMAL /HPF

## 2024-10-07 PROCEDURE — 86762 RUBELLA ANTIBODY: CPT

## 2024-10-07 PROCEDURE — 87086 URINE CULTURE/COLONY COUNT: CPT

## 2024-10-07 PROCEDURE — 87340 HEPATITIS B SURFACE AG IA: CPT

## 2024-10-07 PROCEDURE — 85025 COMPLETE CBC W/AUTO DIFF WBC: CPT

## 2024-10-07 PROCEDURE — 86780 TREPONEMA PALLIDUM: CPT

## 2024-10-07 PROCEDURE — 81001 URINALYSIS AUTO W/SCOPE: CPT

## 2024-10-07 PROCEDURE — 36415 COLL VENOUS BLD VENIPUNCTURE: CPT

## 2024-10-07 PROCEDURE — 86850 RBC ANTIBODY SCREEN: CPT

## 2024-10-07 PROCEDURE — 86803 HEPATITIS C AB TEST: CPT

## 2024-10-07 PROCEDURE — 86901 BLOOD TYPING SEROLOGIC RH(D): CPT

## 2024-10-07 PROCEDURE — 87389 HIV-1 AG W/HIV-1&-2 AB AG IA: CPT

## 2024-10-07 PROCEDURE — 82950 GLUCOSE TEST: CPT

## 2024-10-07 PROCEDURE — 86900 BLOOD TYPING SEROLOGIC ABO: CPT

## 2024-10-08 LAB — BACTERIA UR CULT: NORMAL

## 2024-10-11 ENCOUNTER — TELEPHONE (OUTPATIENT)
Dept: PERINATAL CARE | Facility: OTHER | Age: 34
End: 2024-10-11

## 2024-10-11 NOTE — TELEPHONE ENCOUNTER
[facility] 10/11 at 1025 notifying patient of location change for 11/26 Detailed Ultrasound appointment. Patient scheduled for 11/26 at 0800 at [facility]. #[phone] provided to patient.

## 2024-10-15 ENCOUNTER — INITIAL PRENATAL (OUTPATIENT)
Dept: OBGYN CLINIC | Facility: MEDICAL CENTER | Age: 34
End: 2024-10-15
Payer: COMMERCIAL

## 2024-10-15 VITALS
SYSTOLIC BLOOD PRESSURE: 118 MMHG | DIASTOLIC BLOOD PRESSURE: 80 MMHG | BODY MASS INDEX: 27.37 KG/M2 | OXYGEN SATURATION: 98 % | WEIGHT: 180 LBS | HEART RATE: 104 BPM

## 2024-10-15 DIAGNOSIS — Z36.9 ANTENATAL SCREENING ENCOUNTER: ICD-10-CM

## 2024-10-15 DIAGNOSIS — Z3A.14 14 WEEKS GESTATION OF PREGNANCY: ICD-10-CM

## 2024-10-15 DIAGNOSIS — Z34.82 ENCOUNTER FOR SUPERVISION OF OTHER NORMAL PREGNANCY IN SECOND TRIMESTER: Primary | ICD-10-CM

## 2024-10-15 DIAGNOSIS — Z11.3 SCREEN FOR STD (SEXUALLY TRANSMITTED DISEASE): ICD-10-CM

## 2024-10-15 DIAGNOSIS — O34.219 PREVIOUS CESAREAN DELIVERY AFFECTING PREGNANCY: ICD-10-CM

## 2024-10-15 DIAGNOSIS — Z23 NEED FOR INFLUENZA VACCINATION: ICD-10-CM

## 2024-10-15 DIAGNOSIS — Z98.891 HISTORY OF CESAREAN DELIVERY: ICD-10-CM

## 2024-10-15 PROBLEM — Z3A.12 12 WEEKS GESTATION OF PREGNANCY: Status: RESOLVED | Noted: 2024-09-23 | Resolved: 2024-10-15

## 2024-10-15 LAB
SL AMB  POCT GLUCOSE, UA: NORMAL
SL AMB POCT URINE PROTEIN: NORMAL

## 2024-10-15 PROCEDURE — 81002 URINALYSIS NONAUTO W/O SCOPE: CPT | Performed by: NURSE PRACTITIONER

## 2024-10-15 PROCEDURE — 90471 IMMUNIZATION ADMIN: CPT | Performed by: NURSE PRACTITIONER

## 2024-10-15 PROCEDURE — 87591 N.GONORRHOEAE DNA AMP PROB: CPT | Performed by: NURSE PRACTITIONER

## 2024-10-15 PROCEDURE — PNV: Performed by: NURSE PRACTITIONER

## 2024-10-15 PROCEDURE — 87491 CHLMYD TRACH DNA AMP PROBE: CPT | Performed by: NURSE PRACTITIONER

## 2024-10-15 PROCEDURE — 90656 IIV3 VACC NO PRSV 0.5 ML IM: CPT | Performed by: NURSE PRACTITIONER

## 2024-10-15 NOTE — PROGRESS NOTES
Genevieve Lyles is here for her first prenatal visit accompanied by  Nathaniel.   Age: 34 y.o.  LMP: Patient's last menstrual period was 2024 (approximate).    Gestational age 14w0d based on LMP consistent with early US    4  Para 2  (3 yo boy, 1 yo boy). Plan to tell the boys and rest of family this week.   Previous C Section: Yes with second delivery; suspected breech.     She admits to nausea without vomiting. Improving.   She has had no vaginal bleeding  Patients has no complaints    [facility] appt on 10/1/24. Normal fetal growth. AGA 12 w3d. JANAK WNL. Normal early anatomy survey. Declined diagnostic genetic testing.     She  is planning  follow up with maternal fetal medicine . This is scheduled for 24    Allergies: Patient has no known allergies.  Medication use :   Current Outpatient Medications   Medication Sig Dispense Refill    ascorbic acid (VITAMIN C) 250 mg tablet Take 500 mg by mouth daily      Cholecalciferol 25 MCG (1000 UT) tablet Take 2,000 Units by mouth daily      Prenatal MV-Min-Fe Fum-FA-DHA (PRENATAL 1 PO) Take by mouth       No current facility-administered medications for this visit.     She is a non-smoker  In this pregnancy her  medical history is significant for none    Prenatal Labs     A positive  Antibody negative  CBC 13.1/38.8;      Hep B and Hep C nonreactive  HIV nonreactive  Syphilis NR   Rubella immune  UA UC WNL  GCT normal    GC/CT collected today  24 normal pap with negative HR HPV   AFP ordered today and aware of timing recommendations.       Her obstetrical, medical, surgical and family history was reviewed  Her physical exam was within normal limits  FHT's: 154    Discussed as well during this visit was diet, prenatal vitamins, prenatal visits, lab testing, breast feeding, vaccinations, maternal fetal medicine consultations, prevention of exposure to infectious diseases and toxic chemicals, lifestyle.    Influenza vaccine: received today.    Covid vaccine: x 5 doses with last 23    Risk factors for this pregnancy include: prior c/s-desires TOLAC.   Problem   History of  Delivery   14 Weeks Gestation of Pregnancy   Previous  Delivery Affecting Pregnancy    Previous c/s due to labile presentation.   Desires      12 Weeks Gestation of Pregnancy (Resolved)     Previous  delivery affecting pregnancy  Desires TOLAC after c/s

## 2024-10-15 NOTE — PATIENT INSTRUCTIONS
Congratulations!! Please review our Pregnancy Essential Guide and [facility] L&D Virtual tour from our networks website.     [facility] Pregnancy Essentials Guide  [facility] ([email or web address])     [facility] ([email or web address]

## 2024-10-16 LAB
C TRACH DNA SPEC QL NAA+PROBE: NEGATIVE
N GONORRHOEA DNA SPEC QL NAA+PROBE: NEGATIVE

## 2024-11-04 ENCOUNTER — APPOINTMENT (OUTPATIENT)
Dept: LAB | Facility: CLINIC | Age: 34
End: 2024-11-04
Payer: COMMERCIAL

## 2024-11-04 DIAGNOSIS — Z36.9 ANTENATAL SCREENING ENCOUNTER: ICD-10-CM

## 2024-11-04 PROCEDURE — 36415 COLL VENOUS BLD VENIPUNCTURE: CPT

## 2024-11-04 PROCEDURE — 82105 ALPHA-FETOPROTEIN SERUM: CPT

## 2024-11-07 LAB
2ND TRIMESTER 4 SCREEN SERPL-IMP: NORMAL
AFP ADJ MOM SERPL: 0.79
AFP INTERP AMN-IMP: NORMAL
AFP INTERP SERPL-IMP: NORMAL
AFP INTERP SERPL-IMP: NORMAL
AFP SERPL-MCNC: 26.5 NG/ML
AGE AT DELIVERY: 32.8 YR
GA METHOD: NORMAL
GA: 16.9 WEEKS
IDDM PATIENT QL: NO
MULTIPLE PREGNANCY: NO
NEURAL TUBE DEFECT RISK FETUS: NORMAL %

## 2024-11-10 ENCOUNTER — OFFICE VISIT (OUTPATIENT)
Dept: URGENT CARE | Facility: MEDICAL CENTER | Age: 34
End: 2024-11-10
Payer: COMMERCIAL

## 2024-11-10 VITALS
TEMPERATURE: 98.7 F | RESPIRATION RATE: 20 BRPM | BODY MASS INDEX: 27.52 KG/M2 | OXYGEN SATURATION: 99 % | HEART RATE: 109 BPM | WEIGHT: 181 LBS | SYSTOLIC BLOOD PRESSURE: 129 MMHG | DIASTOLIC BLOOD PRESSURE: 63 MMHG

## 2024-11-10 DIAGNOSIS — J02.9 SORE THROAT: ICD-10-CM

## 2024-11-10 DIAGNOSIS — J20.9 ACUTE BRONCHITIS, UNSPECIFIED ORGANISM: Primary | ICD-10-CM

## 2024-11-10 LAB — S PYO AG THROAT QL: NEGATIVE

## 2024-11-10 PROCEDURE — G0383 LEV 4 HOSP TYPE B ED VISIT: HCPCS

## 2024-11-10 PROCEDURE — G0382 LEV 3 HOSP TYPE B ED VISIT: HCPCS | Performed by: FAMILY MEDICINE

## 2024-11-10 PROCEDURE — 87880 STREP A ASSAY W/OPTIC: CPT | Performed by: FAMILY MEDICINE

## 2024-11-10 RX ORDER — FLUTICASONE PROPIONATE 50 MCG
1 SPRAY, SUSPENSION (ML) NASAL DAILY
Qty: 9.9 ML | Refills: 0 | Status: SHIPPED | OUTPATIENT
Start: 2024-11-10

## 2024-11-10 RX ORDER — AZELASTINE 1 MG/ML
1 SPRAY, METERED NASAL 2 TIMES DAILY
Qty: 30 ML | Refills: 0 | Status: SHIPPED | OUTPATIENT
Start: 2024-11-10

## 2024-11-10 NOTE — PROGRESS NOTES
"  [facility]        NAME: Genevieve Lyles is a 34 y.o. female  : 1990    MRN: [id number]  DATE: November 10, 2024  TIME: 5:18 PM    Assessment and Plan   Sore throat [J02.9]  1. Sore throat  POCT rapid ANTIGEN strepA      2. Acute bronchitis, unspecified organism  fluticasone (FLONASE) 50 mcg/act nasal spray    azelastine (ASTELIN) 0.1 % nasal spray    amoxicillin-clavulanate (AUGMENTIN) 875-125 mg per tablet        Treated for bronchitis given length of symptoms and worsening symptoms  Try nasal sprays first and if not improving start antibiotics.    Patient Instructions       Follow up with PCP in 3-5 days.  Proceed to  ER if symptoms worsen.    If tests have been performed at [facility], our office will contact you with results if changes need to be made to the care plan discussed with you at the visit.  You can review your full results on [facility] MyChart.    Chief Complaint     Chief Complaint   Patient presents with    Sore Throat     Sore throat     Cough     Productive cough, chest congestion     Shortness of Breath     Mild shortness of breath; unsure if it is \"pregnancy\" related or pneumonia; multiple kids in school are out with pneumonia; 18 weeks pregnant          History of Present Illness       35 yo f who is approx 18 wks pregnant who complains of sore throat, cough, congestion and symptoms worsening  No fevers at this time  Present for >10 days          Review of Systems   Review of Systems   Constitutional:  Negative for activity change, chills, fatigue and fever.   HENT:  Positive for congestion and sore throat.    Respiratory:  Positive for cough and shortness of breath.    Cardiovascular:  Negative for chest pain.   Gastrointestinal:  Negative for nausea and vomiting.         Current Medications       Current Outpatient Medications:     amoxicillin-clavulanate (AUGMENTIN) 875-125 mg per tablet, Take 1 tablet by mouth every 12 (twelve) hours for 5 days, Disp: 10 tablet, Rfl: " 0    ascorbic acid (VITAMIN C) 250 mg tablet, Take 500 mg by mouth daily, Disp: , Rfl:     azelastine (ASTELIN) 0.1 % nasal spray, 1 spray into each nostril 2 (two) times a day Use in each nostril as directed, Disp: 30 mL, Rfl: 0    Cholecalciferol 25 MCG (1000 UT) tablet, Take 2,000 Units by mouth daily, Disp: , Rfl:     fluticasone (FLONASE) 50 mcg/act nasal spray, 1 spray into each nostril daily, Disp: 9.9 mL, Rfl: 0    Prenatal MV-Min-Fe Fum-FA-DHA (PRENATAL 1 PO), Take by mouth, Disp: , Rfl:     Current Allergies     Allergies as of 11/10/2024    (No Known Allergies)            The following portions of the patient's history were reviewed and updated as appropriate: allergies, current medications, past family history, past medical history, past social history, past surgical history and problem list.     Past Medical History:   Diagnosis Date    Irregular menses     27-50 days     Miscarriage 2018    Varicella     as a child     Visual impairment     Glasses and contacts       Past Surgical History:   Procedure Laterality Date     SECTION  5/10/2022    FINGER SURGERY Right 2008    Ring Finger    [place]  DELIVERY ONLY N/A 05/10/2022    Procedure:  SECTION ();  Surgeon: Mary Ellen Ward MD;  Location: [facility] ;  Service: Obstetrics    WISDOM TOOTH EXTRACTION         Family History   Problem Relation Age of Onset    Migraines Mother     No Known Problems Father     Migraines Brother     Diabetes Maternal Grandmother     Hypertension Maternal Grandmother     Stroke Maternal Grandmother     Diabetes Maternal Grandfather     Skin cancer Maternal Grandfather     Hypertension Maternal Grandfather     Stroke Maternal Grandfather     Transient ischemic attack Maternal Grandfather     Kidney failure Maternal Grandfather     Hypertension Paternal Grandmother     Breast cancer Paternal Grandmother         40s    Miscarriages / Stillbirths Paternal Grandmother         3 miscarriages     Hypertension Paternal Grandfather     Stroke Paternal Grandfather     No Known Problems Child     No Known Problems Child     Colon cancer Neg Hx     Ovarian cancer Neg Hx     Uterine cancer Neg Hx     Cervical cancer Neg Hx          Medications have been verified.        Objective   /63   Pulse (!) 109   Temp 98.7 °F (37.1 °C) (Temporal)   Resp 20   Wt 82.1 kg (181 lb)   LMP 07/07/2024 (Approximate)   SpO2 99%   BMI 27.52 kg/m²   Patient's last menstrual period was 07/07/2024 (approximate).       Physical Exam     Physical Exam  Vitals reviewed.   Constitutional:       General: She is not in acute distress.     Appearance: Normal appearance. She is not ill-appearing, toxic-appearing or diaphoretic.   HENT:      Head: Normocephalic and atraumatic.      Right Ear: Tympanic membrane normal.      Left Ear: Tympanic membrane normal.      Nose: Congestion and rhinorrhea present.      Mouth/Throat:      Mouth: Mucous membranes are moist.      Pharynx: Posterior oropharyngeal erythema present. No oropharyngeal exudate.   Eyes:      Pupils: Pupils are equal, round, and reactive to light.   Cardiovascular:      Rate and Rhythm: Normal rate and regular rhythm.      Heart sounds: No murmur heard.  Pulmonary:      Effort: Pulmonary effort is normal. No respiratory distress.      Breath sounds: Normal breath sounds.   Abdominal:      General: Abdomen is flat.      Palpations: Abdomen is soft.      Tenderness: There is no abdominal tenderness.   Musculoskeletal:         General: Normal range of motion.      Cervical back: Normal range of motion. No rigidity or tenderness.   Lymphadenopathy:      Cervical: No cervical adenopathy.   Skin:     General: Skin is warm and dry.      Capillary Refill: Capillary refill takes less than 2 seconds.   Neurological:      General: No focal deficit present.      Mental Status: She is alert and oriented to person, place, and time. Mental status is at baseline.   Psychiatric:          Mood and Affect: Mood normal.         Behavior: Behavior normal.         Thought Content: Thought content normal.

## 2024-11-11 ENCOUNTER — ROUTINE PRENATAL (OUTPATIENT)
Dept: OBGYN CLINIC | Facility: MEDICAL CENTER | Age: 34
End: 2024-11-11
Payer: COMMERCIAL

## 2024-11-11 VITALS
HEART RATE: 120 BPM | WEIGHT: 181 LBS | HEIGHT: 68 IN | DIASTOLIC BLOOD PRESSURE: 76 MMHG | SYSTOLIC BLOOD PRESSURE: 128 MMHG | TEMPERATURE: 98.1 F | BODY MASS INDEX: 27.43 KG/M2

## 2024-11-11 DIAGNOSIS — Z3A.17 17 WEEKS GESTATION OF PREGNANCY: Primary | ICD-10-CM

## 2024-11-11 DIAGNOSIS — Z34.82 ENCOUNTER FOR SUPERVISION OF OTHER NORMAL PREGNANCY IN SECOND TRIMESTER: ICD-10-CM

## 2024-11-11 DIAGNOSIS — O34.219 PREVIOUS CESAREAN DELIVERY AFFECTING PREGNANCY: ICD-10-CM

## 2024-11-11 LAB
SL AMB  POCT GLUCOSE, UA: NORMAL
SL AMB POCT URINE PROTEIN: NORMAL

## 2024-11-11 PROCEDURE — PNV: Performed by: CLINICAL NURSE SPECIALIST

## 2024-11-11 PROCEDURE — 81002 URINALYSIS NONAUTO W/O SCOPE: CPT | Performed by: CLINICAL NURSE SPECIALIST

## 2024-11-11 NOTE — ASSESSMENT & PLAN NOTE
Previous c/s due to labile presentation.   Desires   Briefly reviewed TOLAC vs Repeat C/S    Reviewed increased risk of uterine rupture (approx 1%) with TOLAC which could impact maternal and  outcomes including infection, excess bleeding, hysterectomy, and maternal or fetal death.     Successful  is associated with lower morbidity than repeat C/S and Recovery is shorter, while Rpt C/S is  associated with lower rates of maternal morbidity compared to  Unscussful     Overall success rate of  is 60-80% but is largely dependent upon the circumstances surrounding the time of delivery.  She is a good candidate

## 2024-11-11 NOTE — ASSESSMENT & PLAN NOTE
, 17w6d  Doing well overall  Rec'd flu vaccine last visit.    Currently has URI/bronchitis- started on Abx by PCP  Genetic screening Declined  Completed AFP and results low risk   Keep appt for Level 2 US.   We reviewed the common symptoms of this stage of pregnancy as well as warning signs/symptoms, including spotting/bleeding, severe pelvic pain, or menstrual like cramping.

## 2024-11-11 NOTE — PROGRESS NOTES
Prenatal Visit  Ambulatory Visit  Name: Genevieve Lyles      : 1990      MRN: [id number]  Encounter Provider: ALEXX Ramos  Encounter Date: 2024   Encounter department: [facility] [place]    Assessment & Plan  17 weeks gestation of pregnancy    , 17w6d  Doing well overall  Rec'd flu vaccine last visit.    Currently has URI/bronchitis- started on Abx by PCP  Genetic screening Declined  Completed AFP and results low risk   Keep appt for Level 2 US.   We reviewed the common symptoms of this stage of pregnancy as well as warning signs/symptoms, including spotting/bleeding, severe pelvic pain, or menstrual like cramping.       Previous  delivery affecting pregnancy  Previous c/s due to labile presentation.   Desires   Briefly reviewed TOLAC vs Repeat C/S    Reviewed increased risk of uterine rupture (approx 1%) with TOLAC which could impact maternal and  outcomes including infection, excess bleeding, hysterectomy, and maternal or fetal death.     Successful  is associated with lower morbidity than repeat C/S and Recovery is shorter, while Rpt C/S is  associated with lower rates of maternal morbidity compared to  Unscussful     Overall success rate of  is 60-80% but is largely dependent upon the circumstances surrounding the time of delivery.  She is a good candidate           Encounter for supervision of other normal pregnancy in second trimester    Orders:    POCT urine dip      Subjective:   History of Present Illness     Genevieve Lyles is a 34 y.o. female who presents for Routine Prenatal Visit (ANIA 4/15/25/Blood type A+/Labs UTD /Flu 10/15/24/Urine /Denies LOF, VB, or CTX./Pt has +FM/Questions or concerns-  )          She denies any cramping, LOF/unusual discharge or VB.  She is not sure if feelingfetal movement- maybe a few flutters  Currently has URI/bronchitis- started on Abx by PCP    Objective     BP  "128/76 (BP Location: Left arm, Patient Position: Sitting, Cuff Size: Standard)   Pulse (!) 120   Temp 98.1 °F (36.7 °C)   Ht 5' 8\" (1.727 m)   Wt 82.1 kg (181 lb)   LMP 07/07/2024 (Approximate)   BMI 27.52 kg/m²     Pregravid Weight/BMI: 77.1 kg (170 lb) (BMI 25.85)  Current Weight: 82.1 kg (181 lb)   Total Weight Gain: 4.99 kg (11 lb)     Fetal Heart Rate: 145      ALEXX Ramos  11/11/2024    "

## 2024-11-26 ENCOUNTER — ROUTINE PRENATAL (OUTPATIENT)
Facility: HOSPITAL | Age: 34
End: 2024-11-26
Payer: COMMERCIAL

## 2024-11-26 VITALS
HEIGHT: 68 IN | DIASTOLIC BLOOD PRESSURE: 68 MMHG | SYSTOLIC BLOOD PRESSURE: 122 MMHG | OXYGEN SATURATION: 99 % | WEIGHT: 186 LBS | HEART RATE: 93 BPM | BODY MASS INDEX: 28.19 KG/M2

## 2024-11-26 DIAGNOSIS — Z36.3 ENCOUNTER FOR ANTENATAL SCREENING FOR MALFORMATION: ICD-10-CM

## 2024-11-26 DIAGNOSIS — Z3A.20 20 WEEKS GESTATION OF PREGNANCY: ICD-10-CM

## 2024-11-26 DIAGNOSIS — Z36.0 ENCOUNTER FOR ANTENATAL SCREENING FOR CHROMOSOMAL ANOMALIES: Primary | ICD-10-CM

## 2024-11-26 DIAGNOSIS — O34.219 PREVIOUS CESAREAN DELIVERY AFFECTING PREGNANCY: ICD-10-CM

## 2024-11-26 DIAGNOSIS — Z36.86 ENCOUNTER FOR ANTENATAL SCREENING FOR CERVICAL LENGTH: ICD-10-CM

## 2024-11-26 PROCEDURE — 76805 OB US >/= 14 WKS SNGL FETUS: CPT | Performed by: OBSTETRICS & GYNECOLOGY

## 2024-11-26 PROCEDURE — 76817 TRANSVAGINAL US OBSTETRIC: CPT | Performed by: OBSTETRICS & GYNECOLOGY

## 2024-11-26 PROCEDURE — 99213 OFFICE O/P EST LOW 20 MIN: CPT | Performed by: OBSTETRICS & GYNECOLOGY

## 2024-11-26 PROCEDURE — 36415 COLL VENOUS BLD VENIPUNCTURE: CPT | Performed by: OBSTETRICS & GYNECOLOGY

## 2024-11-26 NOTE — PROGRESS NOTES
Patient chose to have [id number] Non-Invasive Prenatal Screen [id number] PLUS w/ SCA, WITH fetal sex.  Patient choose to be billed through insurance.     Patient given brochure and is aware [facility] will contact patient's insurance and coordinate coverage.  Provided [facility] contact information. General inquiries [phone], Cost estimates [phone] and [facility] Billing [phone]. Website [email or web address].     Blood collection tubes labeled with patient identifiers (name, medical record number, and date of birth).     Filled out [facility] order form. Patient chose to have blood drawn in our office at time of visit. NIPS was drawn from left arm with a butterfly needle by Keena. .      If patient chose to have blood work drawn at a [facility] lab we requested patient notify MFM (via phone call or InNetwork message) when blood collected so office can follow up on results.       Maternal Fetal Medicine will have results in approximately 5-7 business days and will call patient or notify via InNetwork.  Patient aware viewing lab result online will reveal fetal sex if ordered.    Patient verbalized understanding of all instructions and no questions at this time.

## 2024-11-26 NOTE — PROGRESS NOTES
Ultrasound Probe Disinfection    A transvaginal ultrasound was performed.   Prior to use, disinfection was performed with High Level Disinfection Process (IGIGIon).  Probe serial number A3 [facility]: [id number] was used.    Nataly Danielle  11/26/24  8:06 AM

## 2024-12-01 LAB
CFDNA.FET/CFDNA.TOTAL SFR FETUS: NORMAL %
CITATION REF LAB TEST: NORMAL
FET 13+18+21+X+Y ANEUP PLAS.CFDNA: NEGATIVE
FET CHR 21 TS PLAS.CFDNA QL: NEGATIVE
FET CHR 21 TS PLAS.CFDNA QL: NEGATIVE
FET MS X RISK WBC.DNA+CFDNA QL: NOT DETECTED
FET SEX PLAS.CFDNA DOSAGE CFDNA: NORMAL
FET TS 13 RISK PLAS.CFDNA QL: NEGATIVE
FET X + Y ANEUP RISK PLAS.CFDNA SEQ-IMP: NOT DETECTED
GA EST FROM CONCEPTION DATE: NORMAL D
GESTATIONAL AGE > 9:: YES
LAB DIRECTOR NAME PROVIDER: NORMAL
LAB DIRECTOR NAME PROVIDER: NORMAL
LABORATORY COMMENT REPORT: NORMAL
LIMITATIONS OF THE TEST: NORMAL
NEGATIVE PREDICTIVE VALUE: NORMAL
PERFORMANCE CHARACTERISTICS: NORMAL
POSITIVE PREDICTIVE VALUE: NORMAL
REF LAB TEST METHOD: NORMAL
SL AMB NOTE:: NORMAL
TEST PERFORMANCE INFO SPEC: NORMAL

## 2024-12-02 ENCOUNTER — RESULTS FOLLOW-UP (OUTPATIENT)
Dept: PERINATAL CARE | Facility: OTHER | Age: 34
End: 2024-12-02

## 2024-12-11 ENCOUNTER — TELEPHONE (OUTPATIENT)
Dept: OBGYN CLINIC | Facility: CLINIC | Age: 34
End: 2024-12-11

## 2024-12-11 ENCOUNTER — ROUTINE PRENATAL (OUTPATIENT)
Dept: OBGYN CLINIC | Facility: MEDICAL CENTER | Age: 34
End: 2024-12-11
Payer: COMMERCIAL

## 2024-12-11 VITALS
HEART RATE: 87 BPM | HEIGHT: 68 IN | DIASTOLIC BLOOD PRESSURE: 68 MMHG | SYSTOLIC BLOOD PRESSURE: 122 MMHG | BODY MASS INDEX: 28.34 KG/M2 | WEIGHT: 187 LBS

## 2024-12-11 DIAGNOSIS — O34.219 PREVIOUS CESAREAN DELIVERY AFFECTING PREGNANCY: Primary | ICD-10-CM

## 2024-12-11 DIAGNOSIS — Z3A.22 22 WEEKS GESTATION OF PREGNANCY: ICD-10-CM

## 2024-12-11 DIAGNOSIS — Z34.82 PRENATAL CARE, SUBSEQUENT PREGNANCY, SECOND TRIMESTER: ICD-10-CM

## 2024-12-11 LAB
SL AMB  POCT GLUCOSE, UA: NORMAL
SL AMB POCT URINE PROTEIN: NORMAL

## 2024-12-11 PROCEDURE — 81002 URINALYSIS NONAUTO W/O SCOPE: CPT | Performed by: CLINICAL NURSE SPECIALIST

## 2024-12-11 PROCEDURE — PNV: Performed by: CLINICAL NURSE SPECIALIST

## 2024-12-11 NOTE — ASSESSMENT & PLAN NOTE
22w1d  Doing well  BP normal today, systolic was elevated at anatomy US  She is Noting more of a bulge at belly button + diastasis on exam 3 fb. Discussed PP PT referral for early intervention   Level 2 US WNL  Reviewed cynthia benedict ctx and s/s PTL.   Precautions reviewed to call for - Vaginal bldg, LOF, abnormal d/c or > 4 ctx in an hour.   FM not consistent until around 28 wks.

## 2024-12-11 NOTE — PROGRESS NOTES
"Prenatal Visit  Name: Genevieve Lyles      : 1990      MRN: [id number]  Encounter Provider: ALEXX Ramos  Encounter Date: 2024   Encounter department: [facility] [place]    :  Assessment & Plan  Previous  delivery affecting pregnancy  Still planning TOLAC       Prenatal care, subsequent pregnancy, second trimester    Orders:  •  POCT urine dip    22 weeks gestation of pregnancy    22w1d  Doing well  BP normal today, systolic was elevated at anatomy US  She is Noting more of a bulge at belly button + diastasis on exam 3 fb. Discussed PP PT referral for early intervention   Level 2 US WNL  Reviewed cynthia benedict ctx and s/s PTL.   Precautions reviewed to call for - Vaginal bldg, LOF, abnormal d/c or > 4 ctx in an hour.   FM not consistent until around 28 wks.              History of Present Illness     Genevieve is a 34 y.o.  22w1d here for Routine Prenatal Visit (ANIA 4/15/25/Blood type A+/Labs UTD/Flu 10/15/24 /Denies LOF, VB, or CTX./Pt has +FM/Questions or concerns-  )    She denies any cramping, LOF/unusual discharge or VB.  She has noted fetal movement.   She is Noting more of a bulge at belly button   Review of Systems    Objective   /68 (BP Location: Left arm, Patient Position: Sitting, Cuff Size: Standard)   Pulse 87   Ht 5' 8\" (1.727 m)   Wt 84.8 kg (187 lb)   LMP 2024 (Approximate)   BMI 28.43 kg/m²     Pregravid Weight/BMI: 77.1 kg (170 lb) (BMI 25.85)  Total Weight Gain: 7.711 kg (17 lb)     OBGyn Exam  Fetal Heart Rate: 150 Fundal Height (cm): 22 cm    "

## 2024-12-11 NOTE — TELEPHONE ENCOUNTER
----- Message from Rosemary DANG sent at 2024 10:23 AM EDT -----  Regardinnd Trimester Call Due  10/29-

## 2025-01-09 PROBLEM — Z98.891 HISTORY OF CESAREAN DELIVERY: Status: RESOLVED | Noted: 2024-10-15 | Resolved: 2025-01-09

## 2025-01-09 PROBLEM — M62.08 DIASTASIS RECTI: Status: RESOLVED | Noted: 2023-09-28 | Resolved: 2025-01-09

## 2025-01-09 PROBLEM — Z3A.26 26 WEEKS GESTATION OF PREGNANCY: Status: ACTIVE | Noted: 2024-10-15

## 2025-01-10 ENCOUNTER — ROUTINE PRENATAL (OUTPATIENT)
Dept: OBGYN CLINIC | Facility: MEDICAL CENTER | Age: 35
End: 2025-01-10
Payer: COMMERCIAL

## 2025-01-10 VITALS
HEART RATE: 90 BPM | BODY MASS INDEX: 28.64 KG/M2 | DIASTOLIC BLOOD PRESSURE: 72 MMHG | SYSTOLIC BLOOD PRESSURE: 122 MMHG | WEIGHT: 189 LBS | HEIGHT: 68 IN

## 2025-01-10 DIAGNOSIS — Z34.82 PRENATAL CARE, SUBSEQUENT PREGNANCY, SECOND TRIMESTER: ICD-10-CM

## 2025-01-10 DIAGNOSIS — O34.219 PREVIOUS CESAREAN DELIVERY AFFECTING PREGNANCY: Primary | ICD-10-CM

## 2025-01-10 DIAGNOSIS — Z3A.26 26 WEEKS GESTATION OF PREGNANCY: ICD-10-CM

## 2025-01-10 LAB
SL AMB  POCT GLUCOSE, UA: NORMAL
SL AMB POCT URINE PROTEIN: NORMAL

## 2025-01-10 PROCEDURE — 81002 URINALYSIS NONAUTO W/O SCOPE: CPT | Performed by: CLINICAL NURSE SPECIALIST

## 2025-01-10 PROCEDURE — PNV: Performed by: CLINICAL NURSE SPECIALIST

## 2025-01-10 NOTE — PROGRESS NOTES
:  Assessment & Plan  Previous  delivery affecting pregnancy  Planning TOLAC- counseled previously        26 weeks gestation of pregnancy  26w3d  Doing well, reports good FM  Reviewed the following today:  S/S PTL including to call if having >  4-6 ctx in an hour.   S/S HTN disorders in pregnancy such as pre-eclampsia  Orders for routine 3rd trimester bloodwork given today -to be done around 26-28 wks- see orders  Tdap vaccine- to be given to all pregnant women in the 3rd trimester (27-36 weeks) of each pregnancy in order to protect against pertussis.  It is also recommended that anyone who is going to have close contact with the baby also get the vaccine at their health care provider.       Prenatal care, subsequent pregnancy, second trimester    Orders:  •  Anemia Panel w/Reflex, OB; Future  •  CBC and differential; Future  •  RPR-Syphilis Screening (Total Syphilis IGG/IGM); Future  •  Glucose, 1H PG; Future  •  POCT urine dip

## 2025-01-14 ENCOUNTER — APPOINTMENT (OUTPATIENT)
Dept: LAB | Facility: CLINIC | Age: 35
End: 2025-01-14
Payer: COMMERCIAL

## 2025-01-14 DIAGNOSIS — Z34.82 PRENATAL CARE, SUBSEQUENT PREGNANCY, SECOND TRIMESTER: ICD-10-CM

## 2025-01-14 LAB
BASOPHILS # BLD AUTO: 0.02 THOUSANDS/ΜL (ref 0–0.1)
BASOPHILS NFR BLD AUTO: 0 % (ref 0–1)
EOSINOPHIL # BLD AUTO: 0.06 THOUSAND/ΜL (ref 0–0.61)
EOSINOPHIL NFR BLD AUTO: 1 % (ref 0–6)
ERYTHROCYTE [DISTWIDTH] IN BLOOD BY AUTOMATED COUNT: 13.4 % (ref 11.6–15.1)
GLUCOSE 1H P 50 G GLC PO SERPL-MCNC: 177 MG/DL (ref 70–134)
HCT VFR BLD AUTO: 33.6 % (ref 34.8–46.1)
HGB BLD-MCNC: 11.3 G/DL (ref 11.5–15.4)
IMM GRANULOCYTES # BLD AUTO: 0.09 THOUSAND/UL (ref 0–0.2)
IMM GRANULOCYTES NFR BLD AUTO: 1 % (ref 0–2)
LYMPHOCYTES # BLD AUTO: 1.7 THOUSANDS/ΜL (ref 0.6–4.47)
LYMPHOCYTES NFR BLD AUTO: 26 % (ref 14–44)
MCH RBC QN AUTO: 30.2 PG (ref 26.8–34.3)
MCHC RBC AUTO-ENTMCNC: 33.6 G/DL (ref 31.4–37.4)
MCV RBC AUTO: 90 FL (ref 82–98)
MONOCYTES # BLD AUTO: 0.22 THOUSAND/ΜL (ref 0.17–1.22)
MONOCYTES NFR BLD AUTO: 3 % (ref 4–12)
NEUTROPHILS # BLD AUTO: 4.37 THOUSANDS/ΜL (ref 1.85–7.62)
NEUTS SEG NFR BLD AUTO: 69 % (ref 43–75)
NRBC BLD AUTO-RTO: 0 /100 WBCS
PLATELET # BLD AUTO: 150 THOUSANDS/UL (ref 149–390)
PMV BLD AUTO: 10.8 FL (ref 8.9–12.7)
RBC # BLD AUTO: 3.74 MILLION/UL (ref 3.81–5.12)
WBC # BLD AUTO: 6.46 THOUSAND/UL (ref 4.31–10.16)

## 2025-01-14 PROCEDURE — 85025 COMPLETE CBC W/AUTO DIFF WBC: CPT

## 2025-01-14 PROCEDURE — 36415 COLL VENOUS BLD VENIPUNCTURE: CPT

## 2025-01-14 PROCEDURE — 86780 TREPONEMA PALLIDUM: CPT

## 2025-01-14 PROCEDURE — 82950 GLUCOSE TEST: CPT

## 2025-01-15 ENCOUNTER — RESULTS FOLLOW-UP (OUTPATIENT)
Dept: OBGYN CLINIC | Facility: MEDICAL CENTER | Age: 35
End: 2025-01-15

## 2025-01-15 DIAGNOSIS — O99.810 ABNORMAL GLUCOSE AFFECTING PREGNANCY: Primary | ICD-10-CM

## 2025-01-15 LAB — TREPONEMA PALLIDUM IGG+IGM AB [PRESENCE] IN SERUM OR PLASMA BY IMMUNOASSAY: NORMAL

## 2025-01-15 NOTE — RESULT ENCOUNTER NOTE
CBC acceptable for 3rd trimester  Syph screen neg  Glucola is abnormal/elevated  Will need to complete 3 hour GTT. Involves fasting overnight and appt may be needed at lab.  Ideally complete within 2 wks as management of pregnancy may change if GDM diagnosed.   She should check with lab she uses to see if appt is needed.

## 2025-01-17 ENCOUNTER — RESULTS FOLLOW-UP (OUTPATIENT)
Dept: OBGYN CLINIC | Facility: MEDICAL CENTER | Age: 35
End: 2025-01-17

## 2025-01-17 ENCOUNTER — APPOINTMENT (OUTPATIENT)
Dept: LAB | Facility: CLINIC | Age: 35
End: 2025-01-17
Payer: COMMERCIAL

## 2025-01-17 DIAGNOSIS — O99.810 ABNORMAL GLUCOSE AFFECTING PREGNANCY: ICD-10-CM

## 2025-01-17 DIAGNOSIS — O99.810 ABNORMAL GLUCOSE AFFECTING PREGNANCY: Primary | ICD-10-CM

## 2025-01-17 LAB
GLUCOSE 1H P 100 G GLC PO SERPL-MCNC: 159 MG/DL (ref 65–179)
GLUCOSE 2H P 100 G GLC PO SERPL-MCNC: 165 MG/DL (ref 65–154)
GLUCOSE 3H P 100 G GLC PO SERPL-MCNC: 57 MG/DL (ref 65–139)
GLUCOSE P FAST SERPL-MCNC: 69 MG/DL (ref 65–94)

## 2025-01-17 PROCEDURE — 82951 GLUCOSE TOLERANCE TEST (GTT): CPT

## 2025-01-17 PROCEDURE — 82952 GTT-ADDED SAMPLES: CPT

## 2025-01-17 PROCEDURE — 36415 COLL VENOUS BLD VENIPUNCTURE: CPT

## 2025-01-21 ENCOUNTER — ROUTINE PRENATAL (OUTPATIENT)
Dept: OBGYN CLINIC | Facility: MEDICAL CENTER | Age: 35
End: 2025-01-21
Payer: COMMERCIAL

## 2025-01-21 VITALS
BODY MASS INDEX: 29.25 KG/M2 | SYSTOLIC BLOOD PRESSURE: 126 MMHG | HEIGHT: 68 IN | DIASTOLIC BLOOD PRESSURE: 72 MMHG | HEART RATE: 103 BPM | WEIGHT: 193 LBS

## 2025-01-21 DIAGNOSIS — O34.219 PREVIOUS CESAREAN DELIVERY AFFECTING PREGNANCY: ICD-10-CM

## 2025-01-21 DIAGNOSIS — Z3A.28 28 WEEKS GESTATION OF PREGNANCY: ICD-10-CM

## 2025-01-21 DIAGNOSIS — Z34.83 PRENATAL CARE, SUBSEQUENT PREGNANCY, THIRD TRIMESTER: ICD-10-CM

## 2025-01-21 DIAGNOSIS — Z23 ENCOUNTER FOR IMMUNIZATION: Primary | ICD-10-CM

## 2025-01-21 DIAGNOSIS — O99.810 ABNORMAL GLUCOSE AFFECTING PREGNANCY: ICD-10-CM

## 2025-01-21 LAB
DME PARACHUTE DELIVERY DATE REQUESTED: NORMAL
DME PARACHUTE ITEM DESCRIPTION: NORMAL
DME PARACHUTE ORDER STATUS: NORMAL
DME PARACHUTE SUPPLIER NAME: NORMAL
DME PARACHUTE SUPPLIER PHONE: NORMAL
SL AMB  POCT GLUCOSE, UA: NORMAL
SL AMB POCT URINE PROTEIN: NORMAL

## 2025-01-21 PROCEDURE — 81002 URINALYSIS NONAUTO W/O SCOPE: CPT | Performed by: STUDENT IN AN ORGANIZED HEALTH CARE EDUCATION/TRAINING PROGRAM

## 2025-01-21 PROCEDURE — PNV: Performed by: STUDENT IN AN ORGANIZED HEALTH CARE EDUCATION/TRAINING PROGRAM

## 2025-01-21 NOTE — PROGRESS NOTES
Abnormal glucose affecting pregnancy  1/4 elevated    28 weeks gestation of pregnancy  35 yo  at 28+0 here for routine ob visit. Feeling well. No contractions, leaking or bleeding. Good fetal movement. Third boy. Tdap planned for next visit. Return in 2 wks.     The patient was counseled about the labor process. She was counseled regarding potential reasons that she may need a  section including arrest of dilation/descent, non-reassuring fetal status, or worsening maternal status.      She was counseled on the risks of  including bleeding, infection, and injury to surrounding structures including the bowel and bladder. She was counseled that there are medical and surgical methods to manage excessive postpartum bleeding. She was counseled that in the event of excessive blood loss, she may require blood transfusion which includes a small risk of blood borne diseases such as hepatitis and HIV. The patient is OK with receiving a blood transfusion if necessary.  The patient had an opportunity to ask questions and signed consent.      She was counseled about the possible need for operative delivery using the vacuum or forceps and the indications for doing so. She was counseled that there is a small risk of  complications including intracranial hemorrhage, lacerations, nerve damage or fracture as well as the increased risk for more severe maternal laceration. The patient signed consent.     We also dicussed TOLAC. We reviewed the above risks and the addition of the risk of uterine rupture at about 0.6% with normal labor and 1.2% if induction is chosen. She continues to desire TOLAC and understands these risks. She would not want a tubal if  were needed.    Previous  delivery affecting pregnancy  Prior CS for breech presentation   Desires TOLAC, good candidate, prior  proven to 9#2oz

## 2025-02-02 PROBLEM — Z3A.29 29 WEEKS GESTATION OF PREGNANCY: Status: ACTIVE | Noted: 2024-10-15

## 2025-02-02 PROBLEM — Z34.83 PRENATAL CARE, SUBSEQUENT PREGNANCY, THIRD TRIMESTER: Status: ACTIVE | Noted: 2025-02-02

## 2025-02-03 ENCOUNTER — ROUTINE PRENATAL (OUTPATIENT)
Dept: OBGYN CLINIC | Facility: MEDICAL CENTER | Age: 35
End: 2025-02-03
Payer: COMMERCIAL

## 2025-02-03 VITALS
SYSTOLIC BLOOD PRESSURE: 112 MMHG | HEART RATE: 69 BPM | BODY MASS INDEX: 30.11 KG/M2 | OXYGEN SATURATION: 98 % | WEIGHT: 198 LBS | DIASTOLIC BLOOD PRESSURE: 62 MMHG

## 2025-02-03 DIAGNOSIS — O34.219 PREVIOUS CESAREAN DELIVERY AFFECTING PREGNANCY: ICD-10-CM

## 2025-02-03 DIAGNOSIS — Z34.83 PRENATAL CARE, SUBSEQUENT PREGNANCY, THIRD TRIMESTER: Primary | ICD-10-CM

## 2025-02-03 DIAGNOSIS — Z23 NEED FOR TDAP VACCINATION: ICD-10-CM

## 2025-02-03 DIAGNOSIS — O99.810 ABNORMAL GLUCOSE AFFECTING PREGNANCY: ICD-10-CM

## 2025-02-03 DIAGNOSIS — Z3A.29 29 WEEKS GESTATION OF PREGNANCY: ICD-10-CM

## 2025-02-03 LAB
SL AMB  POCT GLUCOSE, UA: NEGATIVE
SL AMB POCT URINE PROTEIN: NEGATIVE

## 2025-02-03 PROCEDURE — 90471 IMMUNIZATION ADMIN: CPT | Performed by: NURSE PRACTITIONER

## 2025-02-03 PROCEDURE — 90715 TDAP VACCINE 7 YRS/> IM: CPT | Performed by: NURSE PRACTITIONER

## 2025-02-03 PROCEDURE — 81002 URINALYSIS NONAUTO W/O SCOPE: CPT | Performed by: NURSE PRACTITIONER

## 2025-02-03 PROCEDURE — PNV: Performed by: NURSE PRACTITIONER

## 2025-02-03 NOTE — ASSESSMENT & PLAN NOTE
Genevieve Lyles is a 34 y.o.  here for OB visit at 29w6d weeks. TWG 10.4 kg (23 lb).  No health concerns.     Denies LOF, vaginal bleeding or contractions.   Performing FKC's daily 10 in 2 hours  TDAP received today.   Influenza vaccine is up to date  Rhogam is not indicated  MFM appt 25  Pediatric CPR classes along with hospital tour recommended  RTO 2 weeks

## 2025-02-03 NOTE — PROGRESS NOTES
29w6d.   Yellow folder given.   Consents signed last visit with Dr. Maxime Rosales-given today. Lot# [id number]  Exp: 8/2026   NDC# 17533-645-70.   28 week labs-completed- abnormal 1 hour.   3 hour-passed.   UTD on Flu.  Growth scheduled 2/18/25.   Breast pump-in process of figuring out which one she wants  LOF-no   VB-no   CTX-no   FM-active  No concerns.

## 2025-02-03 NOTE — PROGRESS NOTES
Problem   29 Weeks Gestation of Pregnancy    Initial labs- x  Gen. Scrn- NIPT- low risk  AFP- nml  Flu vac- 10/15/24   Louann US-x         Previous  Delivery Affecting Pregnancy    Previous c/s due to labile presentation.   Desires        29 weeks gestation of pregnancy  Genevieve Lyles is a 34 y.o.  here for OB visit at 29w6d weeks. TWG 10.4 kg (23 lb).  No health concerns.     Denies LOF, vaginal bleeding or contractions.   Performing FKC's daily 10 in 2 hours  TDAP received today.   Influenza vaccine is up to date  Rhogam is not indicated  MFM appt 25  Pediatric CPR classes along with hospital tour recommended  RTO 2 weeks    Previous  delivery affecting pregnancy  Prior CS for breech presentation   Desires TOLAC, good candidate, prior  proven to 9#2oz

## 2025-02-10 ENCOUNTER — TELEPHONE (OUTPATIENT)
Dept: OBGYN CLINIC | Facility: CLINIC | Age: 35
End: 2025-02-10

## 2025-02-10 LAB
DME PARACHUTE DELIVERY DATE REQUESTED: NORMAL
DME PARACHUTE ITEM DESCRIPTION: NORMAL
DME PARACHUTE ORDER STATUS: NORMAL
DME PARACHUTE SUPPLIER NAME: NORMAL
DME PARACHUTE SUPPLIER PHONE: NORMAL

## 2025-02-10 NOTE — TELEPHONE ENCOUNTER
----- Message from Rosemary DANG sent at 9/30/2024 10:23 AM EDT -----  Regarding: 3rd Trimester Call Due  2/4-3/5   [Fever] : no fever [Chills] : no chills [Nasal Congestion] : no nasal congestion [Postnasal Drip] : no postnasal drip [Sinus Problems] : no sinus problems [Cough] : cough [Chest Tightness] : no chest tightness [Sputum] : no sputum [Dyspnea] : no dyspnea [Pleuritic Pain] : no pleuritic pain [Wheezing] : no wheezing [SOB on Exertion] : no sob on exertion [Chest Discomfort] : no chest discomfort [Edema] : no edema [Syncope] : no syncope [Hay Fever] : no hay fever [Seasonal Allergies] : no seasonal allergies [GERD] : gerd [Abdominal Pain] : no abdominal pain [Nausea] : nausea [Vomiting] : no vomiting [Diarrhea] : no diarrhea [Constipation] : no constipation [Back Pain] : no back pain [Anemia] : anemia [Headache] : no headache [Seizures] : no seizures [Dizziness] : dizziness [Numbness] : no numbness [Paralysis] : no paralysis [Confusion] : no confusion [Diabetes] : diabetes [Thyroid Problem] : no thyroid problem [TextBox_134] : Pt chooses to be mute

## 2025-02-13 NOTE — PATIENT INSTRUCTIONS
"Patient Education     Your baby's movement before birth   The Basics   Written by the doctors and editors at [facility]   When should I start feeling my baby move? -- It depends. Most people first feel their baby moving in the uterus between about 16 and 20 weeks of pregnancy. It might take longer to feel movement if this is your first pregnancy or if the placenta is in the front of your uterus.  What kinds of movements should I feel? -- When you first feel your baby move, it might feel like a gentle flutter in your belly. This is sometimes called \"quickening.\" As the baby grows, their movements will get stronger. You will probably feel them kicking, rolling, and stretching. Later in pregnancy, you might be able to see and feel the baby moving from the outside.  You might notice that your baby is more active at certain times of the day or night. Even before birth, babies have periods of being asleep and awake. When your baby is sleeping, you might notice that they do not move as much.  Should I keep track of my baby's movements? -- If your pregnancy is healthy, you probably do not need to count or record your baby's movements. Feeling regular movement is a good sign that the baby is doing well.  In some cases, your doctor or midwife might ask you to keep track of your baby's movements. If so, they will tell you how to do this and when to call them.  A change in your baby's movements does not always mean that there is a problem. But in some cases, it can be a sign that the baby is having trouble. If your doctor or midwife is concerned, they can do tests to check on the baby.  If I am asked to track movement, how should I do it? -- There are different ways of tracking your baby's movement. This is sometimes called \"kick counting.\"  Your doctor or midwife will tell you exactly what to track. For example, they might ask you to write down:   How long it takes to feel 10 kicks or movements   How many times your baby moves " in 1 hour  Many experts consider at least 10 movements in 2 hours to be a sign that the baby is doing well. But there is no specific cutoff for exactly how much movement is healthy or unhealthy. Some babies are more active than others, and some pregnant people feel movement more easily than others. The main goal of kick counting is to get to know your baby's normal patterns so you can tell if anything changes.  If you are doing kick counting:   Choose a time of day when your baby is usually active.   Find a quiet place where you will not be distracted.   Lie down on your side in a comfortable position.   Check the clock, or set a timer.   Each time you feel your baby move or kick, write down the time. Some people use a smartphone rojas to keep track.   If your baby seems less active than usual, try moving around, eating a snack, and emptying your bladder. This can help wake the baby up if they are asleep.   Stop counting after you have felt 10 kicks, or after the length of time your doctor or midwife told you.  When should I call the doctor? -- Call your doctor or midwife for advice if:   You have concerns about your baby's movement.   Your baby is moving less than they normally do.   You notice a sudden change in the pattern of your baby's movements.   You have any other symptoms that worry you.  All topics are updated as new evidence becomes available and our peer review process is complete.  This topic retrieved from [employer] on: Feb 26, 2024.  Topic 202859 Version 1.0  Release: 32.2.4 - C32.56  © 2024 UpToDate, Inc. and/or its affiliates. All rights reserved.  Consumer Information Use and Disclaimer   Disclaimer: This generalized information is a limited summary of diagnosis, treatment, and/or medication information. It is not meant to be comprehensive and should be used as a tool to help the user understand and/or assess potential diagnostic and treatment options. It does NOT include all information about  conditions, treatments, medications, side effects, or risks that may apply to a specific patient. It is not intended to be medical advice or a substitute for the medical advice, diagnosis, or treatment of a health care provider based on the health care provider's examination and assessment of a patient's specific and unique circumstances. Patients must speak with a health care provider for complete information about their health, medical questions, and treatment options, including any risks or benefits regarding use of medications. This information does not endorse any treatments or medications as safe, effective, or approved for treating a specific patient. UpToDate, Inc. and its affiliates disclaim any warranty or liability relating to this information or the use thereof.The use of this information is governed by the Terms of Use, available at https://www.[employer].com/en/know/clinical-effectiveness-terms. © UpToDate, Inc. and its affiliates and/or licensors. All rights reserved.  Copyright   ©  UpToDate, Inc. and/or its affiliates. All rights reserved.  Thank you for choosing us for your  care today.  If you have any questions about your ultrasound or care, please do not hesitate to contact us or your primary obstetrician.        Some general instructions for your pregnancy are:    Exercise: Aim for 150 minutes per week of regular exercise.  Walking is great!  Nutrition: Choose healthy sources of calcium, iron, and protein.  Avoid ultraprocessed foods and added sugar.  Learn about Preeclampsia: preeclampsia is a common, potentially serious high blood pressure complication in pregnancy.  A blood pressure of 140mmHg (systolic or top number) or 90mmHg (diastolic or bottom number) should be evaluated by your doctor.  Aspirin is sometimes prescribed in early pregnancy to prevent preeclampsia in women with risk factors - ask your obstetrician if you should be on this medication.  For more  resources, visit:  https://www.highriskpregnancyinfo.org/preeclampsia  If you smoke, please try to quit completely but also try to reduce your smoking by as much as possible (as soon as possible).  Do not vape.  Please also avoid cannabis products.  Other warning signs to watch out for in pregnancy or postpartum: chest pain, obstructed breathing or shortness of breath, seizures, thoughts of hurting yourself or your baby, bleeding, a painful or swollen leg, fever, or headache (see AWNN POST-BIRTH Warning Signs campaign).  If these happen call 911.  Itching is also not normal in pregnancy and if you experience this, especially over your hands and feet, potentially worse at night, notify your doctors.

## 2025-02-16 NOTE — PROGRESS NOTES
Please refer to the [facility] ultrasound report in Ob Procedures for additional information regarding today's visit

## 2025-02-18 ENCOUNTER — ULTRASOUND (OUTPATIENT)
Dept: PERINATAL CARE | Facility: OTHER | Age: 35
End: 2025-02-18
Payer: COMMERCIAL

## 2025-02-18 VITALS
HEART RATE: 88 BPM | DIASTOLIC BLOOD PRESSURE: 58 MMHG | HEIGHT: 68 IN | BODY MASS INDEX: 30.31 KG/M2 | WEIGHT: 200 LBS | SYSTOLIC BLOOD PRESSURE: 124 MMHG

## 2025-02-18 DIAGNOSIS — Z3A.32 32 WEEKS GESTATION OF PREGNANCY: Primary | ICD-10-CM

## 2025-02-18 DIAGNOSIS — Z36.4 ULTRASOUND FOR ANTENATAL SCREENING FOR FETAL GROWTH RESTRICTION: ICD-10-CM

## 2025-02-18 PROCEDURE — 76816 OB US FOLLOW-UP PER FETUS: CPT | Performed by: OBSTETRICS & GYNECOLOGY

## 2025-02-18 PROCEDURE — 99213 OFFICE O/P EST LOW 20 MIN: CPT | Performed by: OBSTETRICS & GYNECOLOGY

## 2025-02-18 NOTE — LETTER
February 18, 2025     Rosi Miguel MD  [address] [facility]  [address] [place] [id number]    Patient: Genevieve Lyles   YOB: 1990   Date of Visit: 2/18/2025       Dear Dr. Maxime Miguel:    Thank you for referring Genevieve Lyles to me for evaluation. Below are my notes for this consultation.    If you have questions, please do not hesitate to call me. I look forward to following your patient along with you.         Sincerely,        Ean Powers MD        CC: No Recipients    Ean Powers MD  2/18/2025  2:05 PM  Sign when Signing Visit  Please refer to the [facility] ultrasound report in Ob Procedures for additional information regarding today's visit

## 2025-02-18 NOTE — LETTER
February 18, 2025     Anette Maurice DO  [address]  [address]  [place] [address]    Patient: Genevieve Lyles   YOB: 1990   Date of Visit: 2/18/2025       Dear Dr. Maurice:    Thank you for referring Genevieve Lyles to me for evaluation. Below are my notes for this consultation.    If you have questions, please do not hesitate to call me. I look forward to following your patient along with you.         Sincerely,        Ean Powers MD        CC: No Recipients    Ean Powers MD  2/18/2025  2:05 PM  Sign when Signing Visit  Please refer to the [facility] ultrasound report in Ob Procedures for additional information regarding today's visit

## 2025-02-19 ENCOUNTER — ROUTINE PRENATAL (OUTPATIENT)
Age: 35
End: 2025-02-19
Payer: COMMERCIAL

## 2025-02-19 VITALS
SYSTOLIC BLOOD PRESSURE: 110 MMHG | DIASTOLIC BLOOD PRESSURE: 70 MMHG | BODY MASS INDEX: 30.13 KG/M2 | HEIGHT: 68 IN | WEIGHT: 198.8 LBS | HEART RATE: 91 BPM

## 2025-02-19 DIAGNOSIS — O34.219 PREVIOUS CESAREAN DELIVERY AFFECTING PREGNANCY: ICD-10-CM

## 2025-02-19 DIAGNOSIS — Z34.83 PRENATAL CARE, SUBSEQUENT PREGNANCY, THIRD TRIMESTER: ICD-10-CM

## 2025-02-19 DIAGNOSIS — Z3A.32 32 WEEKS GESTATION OF PREGNANCY: Primary | ICD-10-CM

## 2025-02-19 LAB
SL AMB  POCT GLUCOSE, UA: NORMAL
SL AMB POCT URINE PROTEIN: NORMAL

## 2025-02-19 PROCEDURE — 81002 URINALYSIS NONAUTO W/O SCOPE: CPT | Performed by: OBSTETRICS & GYNECOLOGY

## 2025-02-19 PROCEDURE — PNV: Performed by: OBSTETRICS & GYNECOLOGY

## 2025-02-19 NOTE — ASSESSMENT & PLAN NOTE
Pt doing well today, no complaints  +FM. Denies ctx, vb and lof.   She is up to date on care- sp tdap and flu  Growth scan on 2/18- EFW 2092 grams - 4 lbs 10 oz (71%)   Encouraged to complete birth plan and return at future visit to review  Precautions reviewed. RTO in 2 weeks

## 2025-02-19 NOTE — PROGRESS NOTES
"Problem List Items Addressed This Visit       Previous  delivery affecting pregnancy    Planning for TOLAC         32 weeks gestation of pregnancy - Primary    Pt doing well today, no complaints  +FM. Denies ctx, vb and lof.   She is up to date on care- sp tdap and flu  Growth scan on - EFW 2092 grams - 4 lbs 10 oz (71%)   Encouraged to complete birth plan and return at future visit to review  Precautions reviewed. RTO in 2 weeks         Prenatal care, subsequent pregnancy, third trimester    Relevant Orders    POCT urine dip (Completed)       Genevieve CANALES Rafal is a 34 y.o.  at 32w1d who presents today for routine prenatal visit.     /70 (BP Location: Left arm, Patient Position: Sitting, Cuff Size: Adult)   Pulse 91   Ht 5' 8\" (1.727 m)   Wt 90.2 kg (198 lb 12.8 oz)   LMP 2024 (Approximate)   BMI 30.23 kg/m²       FH 32 cm    "

## 2025-02-19 NOTE — PROGRESS NOTES
Patient presents for a routine prenatal visit    32W1D  Good Fetal Movement  No LOF,bleeding,discharge or cramping.  No current complaints at this time.  Delivery consent is signed.  UTD on Tdap and flu vaccines.

## 2025-03-07 ENCOUNTER — ROUTINE PRENATAL (OUTPATIENT)
Age: 35
End: 2025-03-07
Payer: COMMERCIAL

## 2025-03-07 VITALS
HEART RATE: 92 BPM | DIASTOLIC BLOOD PRESSURE: 66 MMHG | SYSTOLIC BLOOD PRESSURE: 120 MMHG | HEIGHT: 68 IN | WEIGHT: 203 LBS | BODY MASS INDEX: 30.77 KG/M2

## 2025-03-07 DIAGNOSIS — Z34.83 PRENATAL CARE, SUBSEQUENT PREGNANCY, THIRD TRIMESTER: Primary | ICD-10-CM

## 2025-03-07 DIAGNOSIS — O34.219 PREVIOUS CESAREAN DELIVERY AFFECTING PREGNANCY: ICD-10-CM

## 2025-03-07 DIAGNOSIS — Z3A.34 34 WEEKS GESTATION OF PREGNANCY: ICD-10-CM

## 2025-03-07 DIAGNOSIS — O99.810 ABNORMAL GLUCOSE AFFECTING PREGNANCY: ICD-10-CM

## 2025-03-07 LAB
SL AMB  POCT GLUCOSE, UA: NORMAL
SL AMB POCT URINE PROTEIN: NORMAL

## 2025-03-07 PROCEDURE — 81002 URINALYSIS NONAUTO W/O SCOPE: CPT | Performed by: OBSTETRICS & GYNECOLOGY

## 2025-03-07 PROCEDURE — PNV: Performed by: OBSTETRICS & GYNECOLOGY

## 2025-03-07 NOTE — ASSESSMENT & PLAN NOTE
Plans for TOLAC. However, baby breech today. Reviewed spinning babies exercises. Will assess fetal position at next appt at 36 wk. Labor precautions reviewed.

## 2025-03-18 ENCOUNTER — ROUTINE PRENATAL (OUTPATIENT)
Dept: OBGYN CLINIC | Facility: CLINIC | Age: 35
End: 2025-03-18
Payer: COMMERCIAL

## 2025-03-18 VITALS
SYSTOLIC BLOOD PRESSURE: 122 MMHG | WEIGHT: 203 LBS | HEIGHT: 68 IN | OXYGEN SATURATION: 100 % | DIASTOLIC BLOOD PRESSURE: 78 MMHG | BODY MASS INDEX: 30.77 KG/M2 | HEART RATE: 112 BPM

## 2025-03-18 DIAGNOSIS — Z34.83 PRENATAL CARE, SUBSEQUENT PREGNANCY IN THIRD TRIMESTER: Primary | ICD-10-CM

## 2025-03-18 DIAGNOSIS — O99.810 ABNORMAL GLUCOSE AFFECTING PREGNANCY: ICD-10-CM

## 2025-03-18 DIAGNOSIS — Z3A.36 36 WEEKS GESTATION OF PREGNANCY: ICD-10-CM

## 2025-03-18 DIAGNOSIS — O34.219 PREVIOUS CESAREAN DELIVERY AFFECTING PREGNANCY: ICD-10-CM

## 2025-03-18 LAB
SL AMB  POCT GLUCOSE, UA: NORMAL
SL AMB POCT URINE PROTEIN: NORMAL

## 2025-03-18 PROCEDURE — 87150 DNA/RNA AMPLIFIED PROBE: CPT | Performed by: PHYSICIAN ASSISTANT

## 2025-03-18 PROCEDURE — 81002 URINALYSIS NONAUTO W/O SCOPE: CPT | Performed by: PHYSICIAN ASSISTANT

## 2025-03-18 PROCEDURE — PNV: Performed by: PHYSICIAN ASSISTANT

## 2025-03-18 NOTE — ASSESSMENT & PLAN NOTE
Hoping for TOLAC  Vertex on US today. Consider US to confirm position next week given hx of  due to labile presentation in prior delivery

## 2025-03-18 NOTE — PROGRESS NOTES
"Name: Genevieve Lyles      : 1990      MRN: [id number]  Encounter Provider: Denice Bauer PA-C  Encounter Date: 3/18/2025   Encounter department: [facility] [place]  :  Assessment & Plan  Prenatal care, subsequent pregnancy in third trimester    Orders:    POCT urine dip    Strep B DNA probe, amplification    36 weeks gestation of pregnancy  Genevieve  is a 34 y.o.  @36w0d who presents for routine prenatal visit.      Fetal position confirmed on TAUS today. VERTEX on US today. Consider US next week to confirm position if not certain on exam    28 wk labs- complete and passed 3 hour   Growth scan- EFW71% at 32 wks  GBS collected today    Reports good fetal movement.  Denies LOF, vaginal bleeding, regular uterine contractions, cramping, headaches or visual changes.      Reviewed PTL/Labor precautions and FKC.             Previous  delivery affecting pregnancy  Hoping for TOLAC  Vertex on US today. Consider US to confirm position next week given hx of  due to labile presentation in prior delivery         Abnormal glucose affecting pregnancy  Passed 3 hour GTT             History of Present Illness   HPI    Patient is a 34 y.o.  at 36w0d gestation presenting for a routine prenatal visit. Patient is doing well today. Denies VB, LOF, Contractions, headaches, visual changes. She reports good FM     Review of Systems   Constitutional: Negative.    Respiratory: Negative.     Cardiovascular: Negative.    Gastrointestinal: Negative.    Genitourinary: Negative.    Musculoskeletal: Negative.    Skin: Negative.    Psychiatric/Behavioral: Negative.            Objective   /78 (BP Location: Left arm, Patient Position: Sitting, Cuff Size: Large)   Pulse (!) 112   Ht 5' 8\" (1.727 m)   Wt 92.1 kg (203 lb)   LMP 2024 (Approximate)   SpO2 100%   BMI 30.87 kg/m²      Physical Exam  Vitals reviewed.   Constitutional:       Appearance: Normal " appearance.   HENT:      Head: Normocephalic and atraumatic.   Pulmonary:      Effort: Pulmonary effort is normal.   Skin:     General: Skin is warm and dry.   Neurological:      General: No focal deficit present.      Mental Status: She is alert. Mental status is at baseline.   Psychiatric:         Mood and Affect: Mood normal.         Behavior: Behavior normal.         Thought Content: Thought content normal.         Judgment: Judgment normal.         OB exam completed: fundal height, +FHT

## 2025-03-18 NOTE — ASSESSMENT & PLAN NOTE
Genevieve  is a 34 y.o.  @36w0d who presents for routine prenatal visit.      Fetal position confirmed on TAUS today. VERTEX on US today. Consider US next week to confirm position if not certain on exam    28 wk labs- complete and passed 3 hour   Growth scan- EFW71% at 32 wks  GBS collected today    Reports good fetal movement.  Denies LOF, vaginal bleeding, regular uterine contractions, cramping, headaches or visual changes.      Reviewed PTL/Labor precautions and FKC.

## 2025-03-20 LAB — GP B STREP DNA SPEC QL NAA+PROBE: NEGATIVE

## 2025-03-21 ENCOUNTER — RESULTS FOLLOW-UP (OUTPATIENT)
Dept: OBGYN CLINIC | Facility: CLINIC | Age: 35
End: 2025-03-21

## 2025-03-25 ENCOUNTER — ROUTINE PRENATAL (OUTPATIENT)
Age: 35
End: 2025-03-25

## 2025-03-25 VITALS
SYSTOLIC BLOOD PRESSURE: 116 MMHG | BODY MASS INDEX: 30.77 KG/M2 | WEIGHT: 203 LBS | DIASTOLIC BLOOD PRESSURE: 72 MMHG | HEIGHT: 68 IN

## 2025-03-25 DIAGNOSIS — O34.219 PREVIOUS CESAREAN DELIVERY AFFECTING PREGNANCY: ICD-10-CM

## 2025-03-25 DIAGNOSIS — Z34.83 PRENATAL CARE, SUBSEQUENT PREGNANCY IN THIRD TRIMESTER: Primary | ICD-10-CM

## 2025-03-25 DIAGNOSIS — Z3A.37 37 WEEKS GESTATION OF PREGNANCY: ICD-10-CM

## 2025-03-25 PROCEDURE — PNV: Performed by: NURSE PRACTITIONER

## 2025-03-25 NOTE — PATIENT INSTRUCTIONS
Patient Education     Pregnancy - The Ninth Month   About this topic   It is important for you to learn how to take care of yourself to help you have a healthy baby and safe delivery. It is good to have health care throughout your pregnancy.  The ninth month of your pregnancy starts around week 37 and lasts through delivery. By knowing how far along you are, you can learn what is normal for this stage of your pregnancy and plan for what is next.  General   Your body   During the ninth month of your pregnancy, here are some things you can expect.  You may:  Lose your mucous plug as your cervix starts to get thinner and dilate.  Notice a small amount of streaky red or pink spotting.  Your doctor may discuss stripping your membranes to help the labor progress.  Go into labor any time. Most women deliver their baby between 38 and 42 weeks.  Notice your belly button sticks out. It should go back to normal after you give birth.  Have a bit of extra energy as you get ready for your baby  Notice your breasts are leaking more. This is normal as your body is making the first milk you can feed your baby.  Have tests to check on how your baby is doing. Your doctor will most likely not let you be pregnant for more than 42 weeks.  Not gain any weight this month. Some mothers even lose 1 to 2 pounds (.45 to .9 kg).  Your baby's growth and development:  Your baby has been busy swallowing fluid and building up waste products for their first bowel movement.  Your baby may start sucking their thumb.  They may come out with dry skin, bruises, or a misshapen head. Living in a watery fluid and going through labor is tough on your baby too. These are all normal and will change in the first weeks of life.  Your baby may have lots of hair on their head or not very much at all. Long fingernails are normal.  Your baby is about 20 inches (51 cm) long and weighs about 7 1/2 pounds (3,400 gm). Your baby is about the size of a watermelon.  Things  to Think About   Avoid alcohol, drugs, tobacco products, and second hand smoke.  Talk to your doctor if you plan to travel or get on a plane.  Have your bag packed so you are ready for delivery.  Are you planning a natural childbirth or thinking about an epidural? Know things you can do to help cope with labor pain.  If you are having a boy, decide if you want to have him circumcised.  Be sure the car seat is installed the right way so you are ready to bring your baby home.  When do I need to call the doctor?   Contractions every 5 minutes or more often that do not go away with rest, drinking water, or position changes  Headache that does not go away; blurry vision; seeing spots or halos; increase in swelling in your hands, feet, or face; and pain under your ribs on the right side  Low, dull back pain that does not go away  Pressure in your pelvis that feels like your baby is pushing down  A gush or constant trickle of watery or bloody fluid leaking from your vagina  Little to no movement felt by baby in 2 hours. Your baby should be moving at least 10 times every 2 hours.  Vaginal bleeding with or without pain  Fever of 100.4°F (38°C) or higher  After a car accident, fall, or any trauma to your belly  Having thoughts of harming yourself or others, or do not feel safe at home  Last Reviewed Date   2020-05-06  Consumer Information Use and Disclaimer   This generalized information is a limited summary of diagnosis, treatment, and/or medication information. It is not meant to be comprehensive and should be used as a tool to help the user understand and/or assess potential diagnostic and treatment options. It does NOT include all information about conditions, treatments, medications, side effects, or risks that may apply to a specific patient. It is not intended to be medical advice or a substitute for the medical advice, diagnosis, or treatment of a health care provider based on the health care provider's examination  and assessment of a patient’s specific and unique circumstances. Patients must speak with a health care provider for complete information about their health, medical questions, and treatment options, including any risks or benefits regarding use of medications. This information does not endorse any treatments or medications as safe, effective, or approved for treating a specific patient. UpToDate, Inc. and its affiliates disclaim any warranty or liability relating to this information or the use thereof. The use of this information is governed by the Terms of Use, available at https://www.[employer].com/en/know/clinical-effectiveness-terms   Copyright   Copyright © 2024 UpToDate, Inc. and its affiliates and/or licensors. All rights reserved.

## 2025-03-25 NOTE — PROGRESS NOTES
Name: Genevieve Lyles      : 1990      MRN: [id number]  Encounter Provider: ALEXX Olguin  Encounter Date: 3/25/2025   Encounter department: [facility] [place]  :  Assessment & Plan  Prenatal care, subsequent pregnancy in third trimester  She feels well. She denies LOF/CTX/VB. She did not voice any concerns. Discussed fetal kick counting.  She was encouraged to continue with her perineal/vaginal massages to prevent lacerations during the labor process.  Overview charting updated today.         37 weeks gestation of pregnancy  UTD on PNC  Still prefers TOLAC       Previous  delivery affecting pregnancy  Encouraged to walk and have more sex.

## 2025-04-01 ENCOUNTER — ROUTINE PRENATAL (OUTPATIENT)
Age: 35
End: 2025-04-01
Payer: COMMERCIAL

## 2025-04-01 VITALS — WEIGHT: 203.2 LBS | BODY MASS INDEX: 30.9 KG/M2 | SYSTOLIC BLOOD PRESSURE: 122 MMHG | DIASTOLIC BLOOD PRESSURE: 74 MMHG

## 2025-04-01 DIAGNOSIS — O34.219 PREVIOUS CESAREAN DELIVERY AFFECTING PREGNANCY: Primary | ICD-10-CM

## 2025-04-01 DIAGNOSIS — Z3A.38 38 WEEKS GESTATION OF PREGNANCY: ICD-10-CM

## 2025-04-01 DIAGNOSIS — Z34.83 PRENATAL CARE, SUBSEQUENT PREGNANCY, THIRD TRIMESTER: ICD-10-CM

## 2025-04-01 PROBLEM — Z30.09 ENCOUNTER FOR COUNSELING REGARDING CONTRACEPTION: Status: ACTIVE | Noted: 2025-04-01

## 2025-04-01 LAB
SL AMB  POCT GLUCOSE, UA: NEGATIVE
SL AMB POCT URINE PROTEIN: ABNORMAL

## 2025-04-01 PROCEDURE — 81002 URINALYSIS NONAUTO W/O SCOPE: CPT | Performed by: STUDENT IN AN ORGANIZED HEALTH CARE EDUCATION/TRAINING PROGRAM

## 2025-04-01 PROCEDURE — PNV: Performed by: STUDENT IN AN ORGANIZED HEALTH CARE EDUCATION/TRAINING PROGRAM

## 2025-04-01 NOTE — PROGRESS NOTES
Pt is here for routine ob visit   No concerns at this time  Urine +1 protein/neg glucose   No LOF,VB,Contractions  +FM   UTD flu/tdap  Delivery consent signed at previous visit   GBS negative   Declined Induction

## 2025-04-01 NOTE — ASSESSMENT & PLAN NOTE
-precautions reviewed  -s/p Tdap/flu  -GBS neg  -prepregnancy BMI 25 with goal weight gain 15-25#: TWG = 33#

## 2025-04-08 ENCOUNTER — ROUTINE PRENATAL (OUTPATIENT)
Age: 35
End: 2025-04-08
Payer: COMMERCIAL

## 2025-04-08 VITALS
WEIGHT: 205 LBS | SYSTOLIC BLOOD PRESSURE: 118 MMHG | HEIGHT: 69 IN | BODY MASS INDEX: 30.36 KG/M2 | HEART RATE: 74 BPM | DIASTOLIC BLOOD PRESSURE: 76 MMHG | OXYGEN SATURATION: 99 %

## 2025-04-08 DIAGNOSIS — O34.219 PREVIOUS CESAREAN DELIVERY AFFECTING PREGNANCY: ICD-10-CM

## 2025-04-08 DIAGNOSIS — Z34.83 PRENATAL CARE, SUBSEQUENT PREGNANCY, THIRD TRIMESTER: Primary | ICD-10-CM

## 2025-04-08 DIAGNOSIS — Z3A.39 39 WEEKS GESTATION OF PREGNANCY: ICD-10-CM

## 2025-04-08 PROBLEM — O99.810 ABNORMAL GLUCOSE AFFECTING PREGNANCY: Status: RESOLVED | Noted: 2025-01-15 | Resolved: 2025-04-08

## 2025-04-08 PROBLEM — Z30.09 ENCOUNTER FOR COUNSELING REGARDING CONTRACEPTION: Status: RESOLVED | Noted: 2025-04-01 | Resolved: 2025-04-08

## 2025-04-08 LAB
SL AMB  POCT GLUCOSE, UA: NORMAL
SL AMB POCT URINE PROTEIN: NORMAL

## 2025-04-08 PROCEDURE — PNV: Performed by: NURSE PRACTITIONER

## 2025-04-08 PROCEDURE — 81002 URINALYSIS NONAUTO W/O SCOPE: CPT | Performed by: NURSE PRACTITIONER

## 2025-04-08 NOTE — PROGRESS NOTES
Name: Genevieve Lyles      : 1990      MRN: [id number]  Encounter Provider: ALEXX Olguin  Encounter Date: 2025   Encounter department: [facility] [place]  :  Assessment & Plan  Prenatal care, subsequent pregnancy, third trimester  She feels well. She denies LOF/CTX/VB. She did not voice any concerns. Discussed fetal kick counting.  She was encouraged to continue with her perineal/vaginal massages to prevent lacerations during the labor process.  Overview charting updated today.           39 weeks gestation of pregnancy  Up to date on prenatal care  Orders:    POCT urine dip    Previous  delivery affecting pregnancy  Prefers Tolac  Induction declined

## 2025-04-08 NOTE — PATIENT INSTRUCTIONS
Patient Education     Pregnancy - The Ninth Month   About this topic   It is important for you to learn how to take care of yourself to help you have a healthy baby and safe delivery. It is good to have health care throughout your pregnancy.  The ninth month of your pregnancy starts around week 37 and lasts through delivery. By knowing how far along you are, you can learn what is normal for this stage of your pregnancy and plan for what is next.  General   Your body   During the ninth month of your pregnancy, here are some things you can expect.  You may:  Lose your mucous plug as your cervix starts to get thinner and dilate.  Notice a small amount of streaky red or pink spotting.  Your doctor may discuss stripping your membranes to help the labor progress.  Go into labor any time. Most women deliver their baby between 38 and 42 weeks.  Notice your belly button sticks out. It should go back to normal after you give birth.  Have a bit of extra energy as you get ready for your baby  Notice your breasts are leaking more. This is normal as your body is making the first milk you can feed your baby.  Have tests to check on how your baby is doing. Your doctor will most likely not let you be pregnant for more than 42 weeks.  Not gain any weight this month. Some mothers even lose 1 to 2 pounds (.45 to .9 kg).  Your baby's growth and development:  Your baby has been busy swallowing fluid and building up waste products for their first bowel movement.  Your baby may start sucking their thumb.  They may come out with dry skin, bruises, or a misshapen head. Living in a watery fluid and going through labor is tough on your baby too. These are all normal and will change in the first weeks of life.  Your baby may have lots of hair on their head or not very much at all. Long fingernails are normal.  Your baby is about 20 inches (51 cm) long and weighs about 7 1/2 pounds (3,400 gm). Your baby is about the size of a watermelon.  Things  to Think About   Avoid alcohol, drugs, tobacco products, and second hand smoke.  Talk to your doctor if you plan to travel or get on a plane.  Have your bag packed so you are ready for delivery.  Are you planning a natural childbirth or thinking about an epidural? Know things you can do to help cope with labor pain.  If you are having a boy, decide if you want to have him circumcised.  Be sure the car seat is installed the right way so you are ready to bring your baby home.  When do I need to call the doctor?   Contractions every 5 minutes or more often that do not go away with rest, drinking water, or position changes  Headache that does not go away; blurry vision; seeing spots or halos; increase in swelling in your hands, feet, or face; and pain under your ribs on the right side  Low, dull back pain that does not go away  Pressure in your pelvis that feels like your baby is pushing down  A gush or constant trickle of watery or bloody fluid leaking from your vagina  Little to no movement felt by baby in 2 hours. Your baby should be moving at least 10 times every 2 hours.  Vaginal bleeding with or without pain  Fever of 100.4°F (38°C) or higher  After a car accident, fall, or any trauma to your belly  Having thoughts of harming yourself or others, or do not feel safe at home  Last Reviewed Date   2020-05-06  Consumer Information Use and Disclaimer   This generalized information is a limited summary of diagnosis, treatment, and/or medication information. It is not meant to be comprehensive and should be used as a tool to help the user understand and/or assess potential diagnostic and treatment options. It does NOT include all information about conditions, treatments, medications, side effects, or risks that may apply to a specific patient. It is not intended to be medical advice or a substitute for the medical advice, diagnosis, or treatment of a health care provider based on the health care provider's examination  and assessment of a patient’s specific and unique circumstances. Patients must speak with a health care provider for complete information about their health, medical questions, and treatment options, including any risks or benefits regarding use of medications. This information does not endorse any treatments or medications as safe, effective, or approved for treating a specific patient. UpTo[employer], Inc. and its affiliates disclaim any warranty or liability relating to this information or the use thereof. The use of this information is governed by the Terms of Use, available at https://www.woltersMetaLogicsuwer.com/en/know/clinical-effectiveness-terms   Copyright   Copyright © 2024 [employer]ToDate, Inc. and its affiliates and/or licensors. All rights reserved.

## 2025-04-16 ENCOUNTER — ROUTINE PRENATAL (OUTPATIENT)
Age: 35
End: 2025-04-16
Payer: COMMERCIAL

## 2025-04-16 ENCOUNTER — TELEPHONE (OUTPATIENT)
Dept: OBGYN CLINIC | Facility: CLINIC | Age: 35
End: 2025-04-16

## 2025-04-16 VITALS — WEIGHT: 204 LBS | BODY MASS INDEX: 30.13 KG/M2 | SYSTOLIC BLOOD PRESSURE: 102 MMHG | DIASTOLIC BLOOD PRESSURE: 76 MMHG

## 2025-04-16 DIAGNOSIS — O34.219 PREVIOUS CESAREAN DELIVERY AFFECTING PREGNANCY: ICD-10-CM

## 2025-04-16 DIAGNOSIS — Z3A.40 40 WEEKS GESTATION OF PREGNANCY: ICD-10-CM

## 2025-04-16 DIAGNOSIS — Z34.83 PRENATAL CARE, SUBSEQUENT PREGNANCY, THIRD TRIMESTER: Primary | ICD-10-CM

## 2025-04-16 LAB
SL AMB  POCT GLUCOSE, UA: NORMAL
SL AMB POCT URINE PROTEIN: NORMAL

## 2025-04-16 PROCEDURE — PNV

## 2025-04-16 PROCEDURE — 81002 URINALYSIS NONAUTO W/O SCOPE: CPT

## 2025-04-16 NOTE — ASSESSMENT & PLAN NOTE
Blood Type:A+   28 Week Labs- 3 hr GTT 1/4 elevated     AFP low risk  NIPS- low risk     Delivery consent- signed  Breast pump - has  Pediatrician - has  L&D forms- signed     Perineal massage -boo  GBS swab - negative  IOL - discussed consent signed 4/16/25

## 2025-04-16 NOTE — TELEPHONE ENCOUNTER
4/24 8pm NP into 4/25 EC    Patient notified of IOL date,time,and location. Advised patient she may eat a light breakfast/dinner prior to going to L&D. In the interim please report any vaginal bleeding,leakage of fluid,decreased fetal movement or contractions.Reviewed fetal kick counts. Advised to keep all upcoming prenatal visits.

## 2025-04-16 NOTE — PROGRESS NOTES
Prenatal Visit  Name: Genevieve Lyles      : 1990      MRN: [id number]  Encounter Provider: Maylin Thompson PA-C  Encounter Date: 2025   Encounter department: [facility]    :  Assessment & Plan  Prenatal care, subsequent pregnancy, third trimester  40w1d Doing well, reports fetus remains active.    3rd trimester precautions reviewed to reports including LOF, VB or s/s labor. Additionally emphasized the continued importance of paying close attention to the baby's movements and call with decrease/change in fetal activity.    We discussed postdates IOL.   Discussed indication for induction such as elective (> or equal to 39 weeks), medical/obstetric indications and alternatives which is to await spontaneous labor.  Discussed cervical ripening if cervix is unfavorable with prostaglandin (vaginal misoprostol) or mechanical dilation with insertion of balloon catheter.  Discussed that when electively induced nulliparous or multiparous women are compared with expectantly managed women, there is no evidence that elective induction is associated with increased risk of .  Discussed cervical ripening if cervix is unfavorable with prostaglandin (vaginal misoprostol) or mechanical dilation with insertion of balloon catheter.    Orders:    POCT urine dip    40 weeks gestation of pregnancy     Blood Type:A+   28 Week Labs- 3 hr GTT 1/4 elevated     AFP low risk  NIPS- low risk     Delivery consent- signed  Breast pump - has  Pediatrician - has  L&D forms- signed     Perineal massage -boo  GBS swab - negative  IOL - discussed consent signed 25    Previous  delivery affecting pregnancy    Patient desires [place]  Signed IOL consent today          Subjective:   History of Present Illness   History of Present Illness       40w1d   Denies unusual vaginal discharge, LOF, VB, or ctx. Reports Fetus is active.     Notes inconsistent cynthia marichuy  contractions    Review of Systems    Pregravid Weight/BMI: 77.1 kg (170 lb) (BMI 25.09)  Current Weight: 92.5 kg (204 lb)   Total Weight Gain: 15.4 kg (34 lb)    Objective   /76 (BP Location: Left arm, Patient Position: Sitting, Cuff Size: Standard)   Wt 92.5 kg (204 lb)   LMP 07/07/2024 (Approximate)   BMI 30.13 kg/m²     Fetal Heart Rate: 130 , Fundal Height (cm): 40 cm  OBGyn Exam   Maylin Thompson PA-C

## 2025-04-16 NOTE — TELEPHONE ENCOUNTER
----- Message from Maylin Thompson PA-C sent at 4/16/2025 11:02 AM EDT -----  Regarding: postdates IOL  Procedure to be scheduled (IOL or CS): IOL    ANIA: Estimated Date of Delivery: 4/15/25     Indication for delivery: postdates    Requested date (s) of delivery: 4/22/25 (41w0d)    If IOL, anticipated method: dunn, pitocin

## 2025-04-17 ENCOUNTER — ULTRASOUND (OUTPATIENT)
Dept: PERINATAL CARE | Facility: OTHER | Age: 35
End: 2025-04-17
Payer: COMMERCIAL

## 2025-04-17 ENCOUNTER — TELEPHONE (OUTPATIENT)
Dept: PERINATAL CARE | Facility: CLINIC | Age: 35
End: 2025-04-17

## 2025-04-17 VITALS
SYSTOLIC BLOOD PRESSURE: 110 MMHG | HEIGHT: 69 IN | WEIGHT: 203.6 LBS | BODY MASS INDEX: 30.16 KG/M2 | OXYGEN SATURATION: 99 % | HEART RATE: 89 BPM | DIASTOLIC BLOOD PRESSURE: 70 MMHG

## 2025-04-17 DIAGNOSIS — Z3A.40 40 WEEKS GESTATION OF PREGNANCY: Primary | ICD-10-CM

## 2025-04-17 DIAGNOSIS — O48.0 POST-TERM PREGNANCY, 40-42 WEEKS OF GESTATION: ICD-10-CM

## 2025-04-17 PROCEDURE — 76818 FETAL BIOPHYS PROFILE W/NST: CPT | Performed by: STUDENT IN AN ORGANIZED HEALTH CARE EDUCATION/TRAINING PROGRAM

## 2025-04-17 NOTE — PROGRESS NOTES
NST procedure and expected outcome explained to patient.  Daily fetal kick count discussed with handout given.  Patient verbalized understanding of all and was receptive.    Mary Burdick RN

## 2025-04-17 NOTE — PATIENT INSTRUCTIONS
"Patient Education     Your baby's movement before birth   The Basics   Written by the doctors and editors at [facility]   When should I start feeling my baby move? -- It depends. Most people first feel their baby moving in the uterus between about 16 and 20 weeks of pregnancy. It might take longer to feel movement if this is your first pregnancy or if the placenta is in the front of your uterus.  What kinds of movements should I feel? -- When you first feel your baby move, it might feel like a gentle flutter in your belly. This is sometimes called \"quickening.\" As the baby grows, their movements will get stronger. You will probably feel them kicking, rolling, and stretching. Later in pregnancy, you might be able to see and feel the baby moving from the outside.  You might notice that your baby is more active at certain times of the day or night. Even before birth, babies have periods of being asleep and awake. When your baby is sleeping, you might notice that they do not move as much.  Should I keep track of my baby's movements? -- If your pregnancy is healthy, you probably do not need to count or record your baby's movements. Feeling regular movement is a good sign that the baby is doing well.  In some cases, your doctor or midwife might ask you to keep track of your baby's movements. If so, they will tell you how to do this and when to call them.  A change in your baby's movements does not always mean that there is a problem. But in some cases, it can be a sign that the baby is having trouble. If your doctor or midwife is concerned, they can do tests to check on the baby.  If I am asked to track movement, how should I do it? -- There are different ways of tracking your baby's movement. This is sometimes called \"kick counting.\"  Your doctor or midwife will tell you exactly what to track. For example, they might ask you to write down:   How long it takes to feel 10 kicks or movements   How many times your baby moves " in 1 hour  Many experts consider at least 10 movements in 2 hours to be a sign that the baby is doing well. But there is no specific cutoff for exactly how much movement is healthy or unhealthy. Some babies are more active than others, and some pregnant people feel movement more easily than others. The main goal of kick counting is to get to know your baby's normal patterns so you can tell if anything changes.  If you are doing kick counting:   Choose a time of day when your baby is usually active.   Find a quiet place where you will not be distracted.   Lie down on your side in a comfortable position.   Check the clock, or set a timer.   Each time you feel your baby move or kick, write down the time. Some people use a smartphone rojas to keep track.   If your baby seems less active than usual, try moving around, eating a snack, and emptying your bladder. This can help wake the baby up if they are asleep.   Stop counting after you have felt 10 kicks, or after the length of time your doctor or midwife told you.  When should I call the doctor? -- Call your doctor or midwife for advice if:   You have concerns about your baby's movement.   Your baby is moving less than they normally do.   You notice a sudden change in the pattern of your baby's movements.   You have any other symptoms that worry you.  All topics are updated as new evidence becomes available and our peer review process is complete.  This topic retrieved from [employer] on: Feb 26, 2024.  Topic 506704 Version 1.0  Release: 32.2.4 - C32.56  © 2024 UpToDate, Inc. and/or its affiliates. All rights reserved.  Consumer Information Use and Disclaimer   Disclaimer: This generalized information is a limited summary of diagnosis, treatment, and/or medication information. It is not meant to be comprehensive and should be used as a tool to help the user understand and/or assess potential diagnostic and treatment options. It does NOT include all information about  conditions, treatments, medications, side effects, or risks that may apply to a specific patient. It is not intended to be medical advice or a substitute for the medical advice, diagnosis, or treatment of a health care provider based on the health care provider's examination and assessment of a patient's specific and unique circumstances. Patients must speak with a health care provider for complete information about their health, medical questions, and treatment options, including any risks or benefits regarding use of medications. This information does not endorse any treatments or medications as safe, effective, or approved for treating a specific patient. UpToDate, Inc. and its affiliates disclaim any warranty or liability relating to this information or the use thereof.The use of this information is governed by the Terms of Use, available at https://www.woltersHumansizeduwer.com/en/know/clinical-effectiveness-terms. 2024© UpToDate, Inc. and its affiliates and/or licensors. All rights reserved.  Copyright   © 2024 UpToDate, Inc. and/or its affiliates. All rights reserved.

## 2025-04-17 NOTE — PROGRESS NOTES
This patient received  care under my supervision on 25 at 40w2d gestational age at [facility].   BPP is reassuring.   -Darcy Rojas MD

## 2025-04-17 NOTE — TELEPHONE ENCOUNTER
spoke with patient and scheduled her for today in [place] for nst/nery at 3:15pm. patient agreeable and familiar with the campus.

## 2025-04-17 NOTE — PROGRESS NOTES
Non-Stress Testing:    Non-Stress test, equipment, procedure, and expected outcomes explained. Reviewed fetal kick counts and when to call OB.Verified patient understanding of fetal kick counts with teach back method. Patient reports feeling daily fetal movements. Patient has no questions or concerns.     Reviewed non-stress test with Dr. Rojas in-person  There were active fetal movements and we were unable to identify the baseline after a while, Tracing reviewed with Dr. Rojas and she ordered BPP, plan of care discussed with Pt. And she agrred with the plan and also verbalized understandings.

## 2025-04-21 ENCOUNTER — ROUTINE PRENATAL (OUTPATIENT)
Dept: PERINATAL CARE | Facility: OTHER | Age: 35
End: 2025-04-21
Payer: COMMERCIAL

## 2025-04-21 VITALS
SYSTOLIC BLOOD PRESSURE: 100 MMHG | HEART RATE: 78 BPM | DIASTOLIC BLOOD PRESSURE: 70 MMHG | HEIGHT: 69 IN | BODY MASS INDEX: 30.21 KG/M2 | WEIGHT: 204 LBS

## 2025-04-21 DIAGNOSIS — Z3A.40 40 WEEKS GESTATION OF PREGNANCY: Primary | ICD-10-CM

## 2025-04-21 DIAGNOSIS — O48.0 POST-TERM PREGNANCY, 40-42 WEEKS OF GESTATION: ICD-10-CM

## 2025-04-21 PROCEDURE — 59025 FETAL NON-STRESS TEST: CPT

## 2025-04-21 NOTE — PROGRESS NOTES
[facility]: Ms. Lyles was seen today at 40w6d gestational age for NST (found under the pregnancy episode) which I reviewed the RN assessment and agree.     NST shows reactivity without decelerations. One irregular contraction was noted.    Steven's IOL is scheduled for 25 at 41 weeks 2 days at [facility].    Phyllis COFFEY

## 2025-04-24 ENCOUNTER — NURSE TRIAGE (OUTPATIENT)
Dept: OTHER | Facility: OTHER | Age: 35
End: 2025-04-24

## 2025-04-24 ENCOUNTER — ANESTHESIA (INPATIENT)
Dept: LABOR AND DELIVERY | Facility: HOSPITAL | Age: 35
End: 2025-04-24
Payer: COMMERCIAL

## 2025-04-24 ENCOUNTER — APPOINTMENT (OUTPATIENT)
Dept: LABOR AND DELIVERY | Facility: HOSPITAL | Age: 35
End: 2025-04-24
Payer: COMMERCIAL

## 2025-04-24 ENCOUNTER — ANESTHESIA EVENT (INPATIENT)
Dept: LABOR AND DELIVERY | Facility: HOSPITAL | Age: 35
End: 2025-04-24
Payer: COMMERCIAL

## 2025-04-24 ENCOUNTER — HOSPITAL ENCOUNTER (INPATIENT)
Facility: HOSPITAL | Age: 35
LOS: 2 days | Discharge: HOME | End: 2025-04-26
Attending: OBSTETRICS & GYNECOLOGY | Admitting: OBSTETRICS & GYNECOLOGY
Payer: COMMERCIAL

## 2025-04-24 DIAGNOSIS — Z98.891 STATUS POST REPEAT LOW TRANSVERSE CESAREAN SECTION: ICD-10-CM

## 2025-04-24 DIAGNOSIS — O48.0 41 WEEKS GESTATION OF PREGNANCY: ICD-10-CM

## 2025-04-24 DIAGNOSIS — Z3A.41 41 WEEKS GESTATION OF PREGNANCY: ICD-10-CM

## 2025-04-24 DIAGNOSIS — O32.3XX0: ICD-10-CM

## 2025-04-24 DIAGNOSIS — O48.0 POST-TERM PREGNANCY, 40-42 WEEKS OF GESTATION: Primary | ICD-10-CM

## 2025-04-24 PROBLEM — O47.9 UTERINE CONTRACTIONS: Status: ACTIVE | Noted: 2025-04-24

## 2025-04-24 LAB
ABO GROUP BLD: NORMAL
BASE EXCESS BLDCOA CALC-SCNC: -1.8 MMOL/L (ref 3–11)
BASE EXCESS BLDCOV CALC-SCNC: -4.8 MMOL/L (ref 1–9)
BLD GP AB SCN SERPL QL: NEGATIVE
ERYTHROCYTE [DISTWIDTH] IN BLOOD BY AUTOMATED COUNT: 13.7 % (ref 11.6–15.1)
HCO3 BLDCOA-SCNC: 25.3 MMOL/L (ref 17.3–27.3)
HCO3 BLDCOV-SCNC: 20.5 MMOL/L (ref 12.2–28.6)
HCT VFR BLD AUTO: 34.8 % (ref 34.8–46.1)
HGB BLD-MCNC: 12.1 G/DL (ref 11.5–15.4)
HOLD SPECIMEN: NORMAL
MCH RBC QN AUTO: 31.2 PG (ref 26.8–34.3)
MCHC RBC AUTO-ENTMCNC: 34.8 G/DL (ref 31.4–37.4)
MCV RBC AUTO: 90 FL (ref 82–98)
O2 CT VFR BLDCOA CALC: 7.9 ML/DL
OXYHGB MFR BLDCOA: 37.3 %
OXYHGB MFR BLDCOV: 50.1 %
PCO2 BLDCOA: 52.1 MM[HG] (ref 30–60)
PCO2 BLDCOV: 38.7 MM HG (ref 27–43)
PH BLDCOA: 7.3 [PH] (ref 7.23–7.43)
PH BLDCOV: 7.34 [PH] (ref 7.19–7.49)
PLATELET # BLD AUTO: 134 THOUSANDS/UL (ref 149–390)
PMV BLD AUTO: 11.3 FL (ref 8.9–12.7)
PO2 BLDCOA: 19.3 MM HG (ref 5–25)
PO2 BLDCOV: 21.8 MM HG (ref 15–45)
RBC # BLD AUTO: 3.88 MILLION/UL (ref 3.81–5.12)
RH BLD: POSITIVE
SAO2 % BLDCOV: 9.8 ML/DL
SPECIMEN EXPIRATION DATE: NORMAL
TREPONEMA PALLIDUM IGG+IGM AB [PRESENCE] IN SERUM OR PLASMA BY IMMUNOASSAY: NORMAL
WBC # BLD AUTO: 9.86 THOUSAND/UL (ref 4.31–10.16)

## 2025-04-24 PROCEDURE — 86780 TREPONEMA PALLIDUM: CPT

## 2025-04-24 PROCEDURE — 86901 BLOOD TYPING SEROLOGIC RH(D): CPT

## 2025-04-24 PROCEDURE — 59510 CESAREAN DELIVERY: CPT | Performed by: OBSTETRICS & GYNECOLOGY

## 2025-04-24 PROCEDURE — 86900 BLOOD TYPING SEROLOGIC ABO: CPT

## 2025-04-24 PROCEDURE — 0UQC0ZZ REPAIR CERVIX, OPEN APPROACH: ICD-10-PCS | Performed by: OBSTETRICS & GYNECOLOGY

## 2025-04-24 PROCEDURE — 85027 COMPLETE CBC AUTOMATED: CPT

## 2025-04-24 PROCEDURE — 86850 RBC ANTIBODY SCREEN: CPT

## 2025-04-24 PROCEDURE — 88307 TISSUE EXAM BY PATHOLOGIST: CPT | Performed by: PATHOLOGY

## 2025-04-24 PROCEDURE — 99214 OFFICE O/P EST MOD 30 MIN: CPT

## 2025-04-24 PROCEDURE — 82805 BLOOD GASES W/O2 SATURATION: CPT | Performed by: OBSTETRICS & GYNECOLOGY

## 2025-04-24 PROCEDURE — NC001 PR NO CHARGE: Performed by: OBSTETRICS & GYNECOLOGY

## 2025-04-24 RX ORDER — CEFAZOLIN SODIUM 2 G/50ML
2000 SOLUTION INTRAVENOUS EVERY 8 HOURS
Status: DISCONTINUED | OUTPATIENT
Start: 2025-04-24 | End: 2025-04-24

## 2025-04-24 RX ORDER — DIPHENHYDRAMINE HYDROCHLORIDE 50 MG/ML
25 INJECTION, SOLUTION INTRAMUSCULAR; INTRAVENOUS EVERY 6 HOURS PRN
Status: DISCONTINUED | OUTPATIENT
Start: 2025-04-24 | End: 2025-04-24

## 2025-04-24 RX ORDER — ONDANSETRON 2 MG/ML
4 INJECTION INTRAMUSCULAR; INTRAVENOUS EVERY 8 HOURS PRN
Status: DISCONTINUED | OUTPATIENT
Start: 2025-04-24 | End: 2025-04-26 | Stop reason: HOSPADM

## 2025-04-24 RX ORDER — KETOROLAC TROMETHAMINE 30 MG/ML
15 INJECTION, SOLUTION INTRAMUSCULAR; INTRAVENOUS EVERY 6 HOURS SCHEDULED
Status: COMPLETED | OUTPATIENT
Start: 2025-04-24 | End: 2025-04-25

## 2025-04-24 RX ORDER — OXYCODONE HYDROCHLORIDE 5 MG/1
5 TABLET ORAL EVERY 4 HOURS PRN
Status: DISCONTINUED | OUTPATIENT
Start: 2025-04-25 | End: 2025-04-25

## 2025-04-24 RX ORDER — TRANEXAMIC ACID 10 MG/ML
INJECTION, SOLUTION INTRAVENOUS AS NEEDED
Status: DISCONTINUED | OUTPATIENT
Start: 2025-04-24 | End: 2025-04-24

## 2025-04-24 RX ORDER — CEFAZOLIN SODIUM 2 G/50ML
2000 SOLUTION INTRAVENOUS ONCE
Status: DISCONTINUED | OUTPATIENT
Start: 2025-04-24 | End: 2025-04-24

## 2025-04-24 RX ORDER — SODIUM CHLORIDE, SODIUM LACTATE, POTASSIUM CHLORIDE, CALCIUM CHLORIDE 600; 310; 30; 20 MG/100ML; MG/100ML; MG/100ML; MG/100ML
125 INJECTION, SOLUTION INTRAVENOUS CONTINUOUS
Status: DISCONTINUED | OUTPATIENT
Start: 2025-04-24 | End: 2025-04-26 | Stop reason: HOSPADM

## 2025-04-24 RX ORDER — SODIUM CHLORIDE, SODIUM LACTATE, POTASSIUM CHLORIDE, CALCIUM CHLORIDE 600; 310; 30; 20 MG/100ML; MG/100ML; MG/100ML; MG/100ML
INJECTION, SOLUTION INTRAVENOUS CONTINUOUS PRN
Status: DISCONTINUED | OUTPATIENT
Start: 2025-04-24 | End: 2025-04-24

## 2025-04-24 RX ORDER — HYDROMORPHONE HCL/PF 1 MG/ML
0.5 SYRINGE (ML) INJECTION EVERY 2 HOUR PRN
Status: ACTIVE | OUTPATIENT
Start: 2025-04-24 | End: 2025-04-25

## 2025-04-24 RX ORDER — ONDANSETRON 2 MG/ML
4 INJECTION INTRAMUSCULAR; INTRAVENOUS EVERY 8 HOURS PRN
Status: DISCONTINUED | OUTPATIENT
Start: 2025-04-24 | End: 2025-04-24

## 2025-04-24 RX ORDER — BENZOCAINE/MENTHOL 6 MG-10 MG
1 LOZENGE MUCOUS MEMBRANE DAILY PRN
Status: DISCONTINUED | OUTPATIENT
Start: 2025-04-24 | End: 2025-04-26 | Stop reason: HOSPADM

## 2025-04-24 RX ORDER — MORPHINE SULFATE 0.5 MG/ML
INJECTION, SOLUTION EPIDURAL; INTRATHECAL; INTRAVENOUS AS NEEDED
Status: DISCONTINUED | OUTPATIENT
Start: 2025-04-24 | End: 2025-04-24

## 2025-04-24 RX ORDER — METOCLOPRAMIDE HYDROCHLORIDE 5 MG/ML
10 INJECTION INTRAMUSCULAR; INTRAVENOUS EVERY 6 HOURS PRN
Status: DISCONTINUED | OUTPATIENT
Start: 2025-04-24 | End: 2025-04-26 | Stop reason: HOSPADM

## 2025-04-24 RX ORDER — DIPHENHYDRAMINE HYDROCHLORIDE 50 MG/ML
25 INJECTION, SOLUTION INTRAMUSCULAR; INTRAVENOUS EVERY 6 HOURS PRN
Status: DISCONTINUED | OUTPATIENT
Start: 2025-04-24 | End: 2025-04-26 | Stop reason: HOSPADM

## 2025-04-24 RX ORDER — PROPOFOL 10 MG/ML
INJECTION, EMULSION INTRAVENOUS AS NEEDED
Status: DISCONTINUED | OUTPATIENT
Start: 2025-04-24 | End: 2025-04-24

## 2025-04-24 RX ORDER — CALCIUM CARBONATE 500 MG/1
1000 TABLET, CHEWABLE ORAL DAILY PRN
Status: DISCONTINUED | OUTPATIENT
Start: 2025-04-24 | End: 2025-04-26 | Stop reason: HOSPADM

## 2025-04-24 RX ORDER — CEFAZOLIN SODIUM 1 G/3ML
INJECTION, POWDER, FOR SOLUTION INTRAMUSCULAR; INTRAVENOUS AS NEEDED
Status: DISCONTINUED | OUTPATIENT
Start: 2025-04-24 | End: 2025-04-24

## 2025-04-24 RX ORDER — ACETAMINOPHEN 325 MG/1
650 TABLET ORAL EVERY 6 HOURS SCHEDULED
Status: DISCONTINUED | OUTPATIENT
Start: 2025-04-24 | End: 2025-04-24

## 2025-04-24 RX ORDER — ENOXAPARIN SODIUM 100 MG/ML
40 INJECTION SUBCUTANEOUS DAILY
Status: DISCONTINUED | OUTPATIENT
Start: 2025-04-25 | End: 2025-04-26 | Stop reason: HOSPADM

## 2025-04-24 RX ORDER — OXYTOCIN/RINGER'S LACTATE 30/500 ML
PLASTIC BAG, INJECTION (ML) INTRAVENOUS CONTINUOUS PRN
Status: DISCONTINUED | OUTPATIENT
Start: 2025-04-24 | End: 2025-04-24

## 2025-04-24 RX ORDER — OXYTOCIN/RINGER'S LACTATE 30/500 ML
PLASTIC BAG, INJECTION (ML) INTRAVENOUS
Status: COMPLETED
Start: 2025-04-24 | End: 2025-04-24

## 2025-04-24 RX ORDER — NALBUPHINE HYDROCHLORIDE 10 MG/ML
5 INJECTION INTRAMUSCULAR; INTRAVENOUS; SUBCUTANEOUS
Status: DISCONTINUED | OUTPATIENT
Start: 2025-04-24 | End: 2025-04-26 | Stop reason: HOSPADM

## 2025-04-24 RX ORDER — KETOROLAC TROMETHAMINE 30 MG/ML
INJECTION, SOLUTION INTRAMUSCULAR; INTRAVENOUS AS NEEDED
Status: DISCONTINUED | OUTPATIENT
Start: 2025-04-24 | End: 2025-04-24

## 2025-04-24 RX ORDER — OXYCODONE HYDROCHLORIDE 10 MG/1
10 TABLET ORAL EVERY 4 HOURS PRN
Status: DISCONTINUED | OUTPATIENT
Start: 2025-04-25 | End: 2025-04-25

## 2025-04-24 RX ORDER — LIDOCAINE HYDROCHLORIDE AND EPINEPHRINE 20; 5 MG/ML; UG/ML
INJECTION, SOLUTION EPIDURAL; INFILTRATION; INTRACAUDAL; PERINEURAL AS NEEDED
Status: DISCONTINUED | OUTPATIENT
Start: 2025-04-24 | End: 2025-04-24

## 2025-04-24 RX ORDER — OXYTOCIN/RINGER'S LACTATE 30/500 ML
62.5 PLASTIC BAG, INJECTION (ML) INTRAVENOUS ONCE
Status: COMPLETED | OUTPATIENT
Start: 2025-04-24 | End: 2025-04-25

## 2025-04-24 RX ORDER — PHENYLEPHRINE HCL IN 0.9% NACL 1 MG/10 ML
100 SYRINGE (ML) INTRAVENOUS ONCE AS NEEDED
Status: DISCONTINUED | OUTPATIENT
Start: 2025-04-24 | End: 2025-04-24

## 2025-04-24 RX ORDER — LIDOCAINE HYDROCHLORIDE AND EPINEPHRINE 15; 5 MG/ML; UG/ML
INJECTION, SOLUTION EPIDURAL
Status: COMPLETED | OUTPATIENT
Start: 2025-04-24 | End: 2025-04-24

## 2025-04-24 RX ORDER — ACETAMINOPHEN 325 MG/1
650 TABLET ORAL EVERY 6 HOURS SCHEDULED
Status: DISCONTINUED | OUTPATIENT
Start: 2025-04-24 | End: 2025-04-25

## 2025-04-24 RX ORDER — ONDANSETRON 2 MG/ML
INJECTION INTRAMUSCULAR; INTRAVENOUS AS NEEDED
Status: DISCONTINUED | OUTPATIENT
Start: 2025-04-24 | End: 2025-04-24

## 2025-04-24 RX ORDER — SIMETHICONE 80 MG
80 TABLET,CHEWABLE ORAL 4 TIMES DAILY PRN
Status: DISCONTINUED | OUTPATIENT
Start: 2025-04-24 | End: 2025-04-26 | Stop reason: HOSPADM

## 2025-04-24 RX ORDER — SENNOSIDES 8.6 MG
1 TABLET ORAL DAILY
Status: DISCONTINUED | OUTPATIENT
Start: 2025-04-25 | End: 2025-04-26 | Stop reason: HOSPADM

## 2025-04-24 RX ORDER — BUPIVACAINE HYDROCHLORIDE 2.5 MG/ML
30 INJECTION, SOLUTION EPIDURAL; INFILTRATION; INTRACAUDAL; PERINEURAL ONCE AS NEEDED
Status: DISCONTINUED | OUTPATIENT
Start: 2025-04-24 | End: 2025-04-24

## 2025-04-24 RX ORDER — IBUPROFEN 600 MG/1
600 TABLET, FILM COATED ORAL EVERY 6 HOURS
Status: DISCONTINUED | OUTPATIENT
Start: 2025-04-26 | End: 2025-04-26 | Stop reason: HOSPADM

## 2025-04-24 RX ORDER — ONDANSETRON 2 MG/ML
4 INJECTION INTRAMUSCULAR; INTRAVENOUS EVERY 6 HOURS PRN
Status: DISCONTINUED | OUTPATIENT
Start: 2025-04-24 | End: 2025-04-24

## 2025-04-24 RX ORDER — DEXAMETHASONE SODIUM PHOSPHATE 10 MG/ML
INJECTION, SOLUTION INTRAMUSCULAR; INTRAVENOUS AS NEEDED
Status: DISCONTINUED | OUTPATIENT
Start: 2025-04-24 | End: 2025-04-24

## 2025-04-24 RX ADMIN — ONDANSETRON 4 MG: 2 INJECTION, SOLUTION INTRAMUSCULAR; INTRAVENOUS at 16:51

## 2025-04-24 RX ADMIN — KETOROLAC TROMETHAMINE 15 MG: 30 INJECTION, SOLUTION INTRAMUSCULAR; INTRAVENOUS at 23:06

## 2025-04-24 RX ADMIN — PROPOFOL 15 MG: 10 INJECTION, EMULSION INTRAVENOUS at 16:58

## 2025-04-24 RX ADMIN — Medication 350 MILLI-UNITS/MIN: at 16:38

## 2025-04-24 RX ADMIN — LIDOCAINE HYDROCHLORIDE AND EPINEPHRINE 2 ML: 20; 5 INJECTION, SOLUTION EPIDURAL; INFILTRATION; INTRACAUDAL; PERINEURAL at 17:03

## 2025-04-24 RX ADMIN — PHENYLEPHRINE HYDROCHLORIDE 90 MCG/MIN: 50 INJECTION INTRAVENOUS at 16:20

## 2025-04-24 RX ADMIN — LIDOCAINE HYDROCHLORIDE AND EPINEPHRINE 2 ML: 15; 5 INJECTION, SOLUTION EPIDURAL at 06:30

## 2025-04-24 RX ADMIN — PROPOFOL 15 MG: 10 INJECTION, EMULSION INTRAVENOUS at 16:52

## 2025-04-24 RX ADMIN — AZITHROMYCIN 500 MG: 500 INJECTION, POWDER, LYOPHILIZED, FOR SOLUTION INTRAVENOUS at 16:23

## 2025-04-24 RX ADMIN — ROPIVACAINE HYDROCHLORIDE: 2 INJECTION, SOLUTION EPIDURAL; INFILTRATION at 06:34

## 2025-04-24 RX ADMIN — DEXAMETHASONE SODIUM PHOSPHATE 10 MG: 10 INJECTION INTRAMUSCULAR; INTRAVENOUS at 16:51

## 2025-04-24 RX ADMIN — SODIUM CHLORIDE, SODIUM LACTATE, POTASSIUM CHLORIDE, AND CALCIUM CHLORIDE 125 ML/HR: .6; .31; .03; .02 INJECTION, SOLUTION INTRAVENOUS at 07:26

## 2025-04-24 RX ADMIN — ROPIVACAINE HYDROCHLORIDE: 2 INJECTION, SOLUTION EPIDURAL; INFILTRATION at 13:30

## 2025-04-24 RX ADMIN — SODIUM CHLORIDE, SODIUM LACTATE, POTASSIUM CHLORIDE, AND CALCIUM CHLORIDE 125 ML/HR: .6; .31; .03; .02 INJECTION, SOLUTION INTRAVENOUS at 11:00

## 2025-04-24 RX ADMIN — MORPHINE SULFATE 3 MG: 0.5 INJECTION, SOLUTION EPIDURAL; INTRATHECAL; INTRAVENOUS at 16:46

## 2025-04-24 RX ADMIN — SODIUM CHLORIDE, SODIUM LACTATE, POTASSIUM CHLORIDE, AND CALCIUM CHLORIDE: .6; .31; .03; .02 INJECTION, SOLUTION INTRAVENOUS at 16:17

## 2025-04-24 RX ADMIN — LIDOCAINE HYDROCHLORIDE AND EPINEPHRINE 3 ML: 15; 5 INJECTION, SOLUTION EPIDURAL at 06:27

## 2025-04-24 RX ADMIN — HYDROMORPHONE HYDROCHLORIDE 0.5 MG: 1 INJECTION, SOLUTION INTRAMUSCULAR; INTRAVENOUS; SUBCUTANEOUS at 19:00

## 2025-04-24 RX ADMIN — SODIUM CHLORIDE, SODIUM LACTATE, POTASSIUM CHLORIDE, AND CALCIUM CHLORIDE: .6; .31; .03; .02 INJECTION, SOLUTION INTRAVENOUS at 16:57

## 2025-04-24 RX ADMIN — OXYTOCIN 62.5 MILLI-UNITS/MIN: 10 INJECTION INTRAVENOUS at 18:00

## 2025-04-24 RX ADMIN — LIDOCAINE HYDROCHLORIDE AND EPINEPHRINE 10 ML: 20; 5 INJECTION, SOLUTION EPIDURAL; INFILTRATION; INTRACAUDAL; PERINEURAL at 16:00

## 2025-04-24 RX ADMIN — KETOROLAC TROMETHAMINE 30 MG: 30 INJECTION, SOLUTION INTRAMUSCULAR; INTRAVENOUS at 17:21

## 2025-04-24 RX ADMIN — TRANEXAMIC ACID 1000 MG: 10 INJECTION, SOLUTION INTRAVENOUS at 16:44

## 2025-04-24 RX ADMIN — SODIUM CHLORIDE, SODIUM LACTATE, POTASSIUM CHLORIDE, AND CALCIUM CHLORIDE 999 ML/HR: .6; .31; .03; .02 INJECTION, SOLUTION INTRAVENOUS at 04:33

## 2025-04-24 RX ADMIN — ACETAMINOPHEN 650 MG: 325 TABLET, FILM COATED ORAL at 23:06

## 2025-04-24 RX ADMIN — LIDOCAINE HYDROCHLORIDE AND EPINEPHRINE 3 ML: 20; 5 INJECTION, SOLUTION EPIDURAL; INFILTRATION; INTRACAUDAL; PERINEURAL at 16:21

## 2025-04-24 RX ADMIN — SODIUM CHLORIDE, SODIUM LACTATE, POTASSIUM CHLORIDE, AND CALCIUM CHLORIDE 999 ML/HR: .6; .31; .03; .02 INJECTION, SOLUTION INTRAVENOUS at 06:23

## 2025-04-24 RX ADMIN — CEFAZOLIN 2000 MG: 1 INJECTION, POWDER, FOR SOLUTION INTRAMUSCULAR; INTRAVENOUS at 16:22

## 2025-04-24 NOTE — OP NOTE
OPERATIVE REPORT  PATIENT NAME: Genevieve Lyles    :  1990  MRN: [id number]  Pt Location: AN L&D OR ROOM 02    SURGERY DATE: 2025    Surgeons and Role:     * Siena Roach MD - Primary     * Ana De Jesus MD - Assisting     * Aminta Pate MD - Assisting    Preop Diagnosis:  41 weeks gestation of pregnancy [O48.0, Z3A.41]  Face presentation of fetus, single or unspecified fetus [O32.3XX0]  Status post repeat low transverse  section [Z98.891]    Post-Op Diagnosis Codes:     * 41 weeks gestation of pregnancy [O48.0, Z3A.41]     * Face presentation of fetus, single or unspecified fetus [O32.3XX0]     * Status post repeat low transverse  section [Z98.891]    Procedure(s) (LRB):   SECTION () REPEAT (N/A)    Specimen(s):  ID Type Source Tests Collected by Time Destination   1 :  Tissue (Placenta on Hold) OB Only Placenta TISSUE EXAM OB (PLACENTA) ONLY Siena Roach MD 2025 1632    A :  Cord Blood Cord BLOOD GAS, VENOUS, CORD, BLOOD GAS, ARTERIAL, CORD Siena Roach MD 2025 1632        Surgical QBL:  Surgical QBL (mL): 1136 mL      Drains:  Urethral Catheter Double-lumen;Non-latex 16 Fr. (Active)   Site Assessment Clean;Skin intact 25 1636   Collection Container Standard drainage bag 25 1636   Securement Method Securing device (Describe) 25 1636   Output (mL) 1000 mL 25 1452   Number of days: 0       Anesthesia Type:   * No anesthesia type entered *    Operative Indications:  41 weeks gestation of pregnancy [O48.0, Z3A.41]  Face presentation of fetus, single or unspecified fetus [O32.3XX0]  Status post repeat low transverse  section [Z98.891]       Lukas Group Classification System:  No Multiple pregnancy, No Transverse or oblique lie, No Breech lie, Gestational age is > or =37 weeks, Multiparous, Previous uterine scar +  is LUKAS GROUP 5    Operative Findings:  Mild thickening of fascial  layer  Gravid, normal appearing uterus  Viable male  delivered at 1637 with APGARS of 9 and 9 at 1 and 5 minutes respectively. Birth weight 9lbs 8oz  Intact placenta with eccentric insertion of 3 vessel umbilical cord  Normal appearing fallopian tubes and ovaries bilaterally  A left sided cervical extension and right sided extension into the broad ligament were noted.   Umbilical Cord Arterial & Venous Blood Gases:  Umbilical Cord Venous Blood Gas:  Results from last 7 days   Lab Units 25  1632   PH COV  7.341   PCO2 COV mm HG 38.7   HCO3 COV mmol/L 20.5   BASE EXC COV mmol/L -4.8*   O2 CT CD VB mL/dL 9.8   O2 HGB, VENOUS CORD % 50.1     Umbilical Cord Arterial Blood Gas:  Results from last 7 days   Lab Units 25  1632   PH COA  7.304   PCO2 COA  52.1   PO2 COA mm HG 19.3   HCO3 COA mmol/L 25.3   BASE EXC COA mmol/L -1.8*   O2 CONTENT CORD ART ml/dl 7.9   O2 HGB, ARTERIAL CORD % 37.3         Complications:   None apparent    Brief History:   Genevieve Lyles is a 34 y.o.  at 41w2d admitted for Labor/TOLAC. She continued to make change and progressed to 8-9/80/0. On exam, fetal presentation was noted to be asynclitic, oblique mentum anterior with extended fetal head. It was discussed that the likelihood of successful vaginal delivery was low and repeat  section was recommended. She was taken to the OR for RLTCS.     Procedure and Technique:  In the operating room the correct patient and procedures were identified. Combined epidural/spinal anesthesia was adequately established. Ancef and Azithromycin IV was given for preoperative surgical prophylaxis. Patient was placed in the dorsal supine position with a left tilt of the hips. SCDs were applied to the lower extremities. Fetal heart tones were confirmed to be category I. Chlorhexidine was used to prep the vagina and perineum. A dunn catheter had already been in place previously. Chloraprep was used to prep the abdomen. She was  draped in the usual sterile fashion. Time out was performed with all in agreement.       Pfannenstiel incision was made in the skin with a surgical scalpel and sharp dissection was carried out over subsequent layers of tissue down to the level of the fascia.   The fascia was incised at the midline and the incision was extended bilaterally using the curved Haas scissors.     The superior edge of the fascial incision was grasped with Kocher clamps, tented up and the underlying rectus muscles were dissected off bluntly and sharply using the scalpel. The same was done inferiorly. The rectus muscles were then divided at midline and the peritoneum was identified, tented up at its upper margin with hemostats taking care to avoid the bladder, and then entered sharply with metzenbaum scissors. The peritoneal incision was extended superiorly and inferiorly.     The Dutch-O retractor was inserted and the vesicouterine peritoneum was identified; this was then grasped with forceps and cut laterally to both sides using the Metzenbaum scissors and blunt dissection.      A bladder blade was reinserted and a transverse incision was made in the lower uterine segment using a new surgical blade. Clear amniotic fluid was noted. The uterine incision was extended cephalad and caudal using blunt dissection. The surgeon's hand was placed into the uterine cavity. The fetal head was identified and elevated through the uterine incision with the assistance of fundal pressure. There was no nuchal cord noted. With gentle traction, the shoulders were delivered followed by the rest of the fetal body. Following delayed cord clamping, the umbilical cord was doubly clamped and cut. The infant was then passed off the table to the awaiting  staff. Blood gas, cord blood, and portion of cord were obtained for analysis and routine blood testing.  The placenta delivered with uterine massage and was noted to be intact with and had eccentric  insertion of a three-vessel cord. The placenta was sent to pathology. Oxytocin was administered by IV infusion to enhance uterine contraction. The uterus was exteriorized and cleared of all clots and remaining products of conception.      A left cervical extension and right sided extension into the broad ligament were noted. Attention was turned to the right sided extension which was closed with 0-Vicryl in a running locked fashion. The right uterine artery and right ureter were palpated and felt to be adequately out of the surgical field. This region was oversewn with 0-Vicryl. TXA was given intraoperatively.     The hysterotomy was reapproximated using 0-Vicryl in a running locked fashion. Closure extended into the left cervical extension which was felt to be adequately closed. A second imbricating stitch with 0-Monocryl was applied. Hemostasis was achieved. The posterior cul-de-sac was cleared of all clots and products of conception. The uterus was replaced into the abdomen and the pericolic gutters were cleared of all clots. Hemostasis was once again confirmed at the hysterotomy. Nuknit was placed on the hysterotomy and bilateral rectus muscles. The fascia was reapproximated using 0-Vicryl in a running nonlocked fashion. The subcutaneous tissue was irrigated and cleared of all clots and debris. Good hemostasis was noted with Bovie electrocautery. The subcutaneous tissue was reapproximated with 2-0 Monocryl.  The skin incision was closed in a running subcuticular fashion with 4-0 Monocryl.  Good hemostasis was noted. Exofin was applied. The uterus was expressed of clots. Patient tolerated the procedure well.  All needle, sponge, and instrument counts were noted to be correct x 2 at the end of the procedure.  Patient was transferred to the recovery room in stable condition.      Dr. Roach was present for the procedure.      Patient Disposition:  PACU         SIGNATURE: Ana De Jesus MD  DATE: April 24,  2025  TIME: 5:48 PM

## 2025-04-24 NOTE — H&P
H&P Exam - Obstetrics   Genevieve Lyles 34 y.o. female MRN: [id number]  Unit/Bed#: -01 Encounter: [id number]      ASSESSMENT:  35yo  at 41w2d weeks gestation who is being admitted for labor/TOLAC.  EFW: 71% at 32w0d  VTX by transabdominal ultrasound     PLAN:  Uterine contractions  Assessment & Plan  33 yo  who presents with contractions q3 minutes. She initially started having irregular contractions throughout the day yesterday, they became closer last night (about 7 minutes apart) and are now q3 minutes.     Plan:  Admit to L&D  CBC, RPR, Type & Screen  Clear liquid diet   Analgesia at maternal request  Expectant management   Continuous fetal monitoring and toco      Previous  delivery affecting pregnancy  Assessment & Plan  P1 , followed by CS for breech presentation           Discussed with Dr. Bowman      This patient will be an INPATIENT  and I certify the anticipated length of stay is >2 Midnights.      History of Present Illness     Chief Complaint: Contractions    HPI:  Genevieve Lyles is a 34 y.o.  female with an ANIA of 4/15/2025, by Ultrasound at 41w2d weeks gestation who is being admitted for labor/TOLAC.    Contractions: q3 minutes  Loss of fluid: denies  Vaginal bleeding: denies  Fetal movement: present       PREGNANCY COMPLICATIONS:   1) hx of  section       OB History    Para Term  AB Living   4 2 2 0 1 2   SAB IAB Ectopic Multiple Live Births   1 0 0 0 2      # Outcome Date GA Lbr Nish/2nd Weight Sex Type Anes PTL Lv   4 Current            3 Term 05/10/22 40w3d  3940 g (8 lb 11 oz) M CS-LTranv   DINAH   2 Term 20 41w1d / 00:57 4160 g (9 lb 2.7 oz) M Vag-Spont EPI  DINAH   1 SAB 2018 7w0d             Obstetric Comments   [facility]       Baby complications/comments: none known     Review of Systems   Constitutional:  Negative for chills and fever.   Respiratory:  Negative for shortness of breath.   "  Cardiovascular:  Negative for chest pain.   Gastrointestinal:  Positive for abdominal pain (contractions).   Genitourinary:  Positive for pelvic pain. Negative for vaginal bleeding.   Musculoskeletal:  Positive for back pain.         Historical Information   Past Medical History:   Diagnosis Date    Irregular menses     27-50 days     Miscarriage 2018    Varicella     as a child     Visual impairment     Glasses and contacts     Past Surgical History:   Procedure Laterality Date     SECTION  5/10/2022    FINGER SURGERY Right 2008    Ring Finger    [place]  DELIVERY ONLY N/A 05/10/2022    Procedure:  SECTION ();  Surgeon: Mary Ellen Ward MD;  Location: [facility] ;  Service: Obstetrics    WISDOM TOOTH EXTRACTION       Social History   Social History     Substance and Sexual Activity   Alcohol Use Not Currently    Alcohol/week: 2.0 standard drinks of alcohol    Comment: social     Social History     Substance and Sexual Activity   Drug Use Never    Comment: fob-marijuana, pt's brother - marijuana     Social History     Tobacco Use   Smoking Status Never   Smokeless Tobacco Never     Family History: Family history non-contributory    Meds/Allergies      Medications Prior to Admission:     ascorbic acid (VITAMIN C) 250 mg tablet    Cholecalciferol 25 MCG (1000 UT) tablet    Prenatal MV-Min-Fe Fum-FA-DHA (PRENATAL 1 PO)   No Known Allergies    OBJECTIVE:    Vitals: Blood pressure 113/61, pulse 71, temperature 97.9 °F (36.6 °C), temperature source Oral, resp. rate 18, height 5' 9\" (1.753 m), weight 93 kg (205 lb), last menstrual period 2024, SpO2 95%, not currently breastfeeding.Body mass index is 30.27 kg/m².    Physical Exam  Vitals reviewed. Exam conducted with a chaperone present.   Constitutional:       Appearance: Normal appearance.   HENT:      Head: Normocephalic and atraumatic.   Eyes:      Extraocular Movements: Extraocular movements intact.   Cardiovascular:      Rate " and Rhythm: Normal rate.   Pulmonary:      Effort: Pulmonary effort is normal. No respiratory distress.   Abdominal:      Comments: gravid   Genitourinary:     General: Normal vulva.   Skin:     General: Skin is warm and dry.   Neurological:      Mental Status: She is alert.   Psychiatric:         Mood and Affect: Mood normal.         Behavior: Behavior normal.             Cervix:   3/70/-3    Fetal heart rate:   Baseline Rate (FHR): 120 bpm  Variability: Moderate  Accelerations: 15 x 15 or greater  Decelerations: None  FHR Category: Category I    [place]:   Contraction Frequency (minutes): 3-4  Contraction Duration (seconds):   Contraction Intensity: Moderate    GBS: negative     Prenatal Labs:   Blood Type:   Lab Results   Component Value Date/Time    ABO Grouping A 04/24/2025 03:41 AM     D (Rh type):   Lab Results   Component Value Date/Time    Rh Factor Positive 04/24/2025 03:41 AM     Antibody Screen:   Lab Results   Component Value Date/Time    Antibody Screen Negative 04/24/2025 03:41 AM     HCT/HGB:   Lab Results   Component Value Date/Time    Hematocrit 34.8 04/24/2025 03:41 AM    Hemoglobin 12.1 04/24/2025 03:41 AM     Platelets:   Lab Results   Component Value Date/Time    Platelets 134 (L) 04/24/2025 03:41 AM     1 hour Glucola:   Lab Results   Component Value Date/Time    Glucose 177 (H) 01/14/2025 10:00 AM   , 3 hour GTT:   Lab Results   Component Value Date/Time    Glucose, GTT - 3 Hour 57 (L) 01/17/2025 11:25 AM     Rubella:   Lab Results   Component Value Date/Time    Rubella IgG Quant 35.2 10/07/2024 09:24 AM     Syphilis:   Lab Results   Component Value Date/Time    Syphilis Total Antibody Non-reactive 01/14/2025 10:00 AM     Hep B:   Lab Results   Component Value Date/Time    Hepatitis B Surface Ag Non-reactive 10/07/2024 09:24 AM     Hep C:   Lab Results   Component Value Date/Time    Hepatitis C Ab Non-reactive 10/07/2024 09:24 AM     HIV:   Lab Results   Component Value Date/Time    HIV-1  p24 Antigen Non-Reactive 10/07/2024 09:24 AM     Chlamydia:   Lab Results   Component Value Date/Time    Chlamydia trachomatis, DNA Probe Negative 10/15/2024 03:43 PM      Gonorrhea:   Lab Results   Component Value Date/Time    N gonorrhoeae, DNA Probe Negative 10/15/2024 03:43 PM       Darcy Graf MD  OBGYN, PGY-I  04/24/25

## 2025-04-24 NOTE — OB LABOR/OXYTOCIN SAFETY PROGRESS
Labor Progress Note - Genevieve Lyles 34 y.o. female MRN: [id number]    Unit/Bed#: -01 Encounter: [id number]       Contraction Frequency (minutes): 6  Contraction Intensity: Moderate  Uterine Activity Characteristics: Regular  Cervical Dilation: 5-6        Cervical Effacement: 80  Fetal Station: -2  Baseline Rate (FHR): 115 bpm  Fetal Heart Rate (FHT): 125 BPM  FHR Category: CAT I               Vital Signs:   Vitals:    04/24/25 1000   BP: 119/64   Pulse:    Resp:    Temp:    SpO2:        Notes/comments:   SVE as above. FHT CAT I. Transabdominal ultrasound performed and fetus noted to be in cephalic presentation. Attmepted AROM unsuccessful due to very thin membrane on babys scalp and copious mucos. Given she is making continued cervical change plan to continue expectant management and recheck cervical change in 2-4 hrs.     Karina Park MD 4/24/2025 10:27 AM

## 2025-04-24 NOTE — ASSESSMENT & PLAN NOTE
35 yo  who presents with contractions q3 minutes. She initially started having irregular contractions throughout the day yesterday, they became closer last night (about 7 minutes apart) and are now q3 minutes.     Plan:  Admit to L&D  CBC, RPR, Type & Screen  Clear liquid diet   Analgesia at maternal request  Expectant management   Continuous fetal monitoring and toco

## 2025-04-24 NOTE — ANESTHESIA PREPROCEDURE EVALUATION
Procedure:  LABOR ANALGESIA    Patient requests labor epidural    Relevant Problems   GYN   (+) 41 weeks gestation of pregnancy        Physical Exam    Airway    Mallampati score: II         Dental   No notable dental hx     Cardiovascular      Pulmonary      Other Findings    Gravid uteruspost-pubertal.      Anesthesia Plan  ASA Score- 2     Anesthesia Type- epidural with ASA Monitors.         Additional Monitors:     Airway Plan:            Plan Factors-Exercise tolerance (METS): >4 METS.    Chart reviewed.   Existing labs reviewed. Patient summary reviewed.                  Induction-     Postoperative Plan-     Perioperative Resuscitation Plan - Level 1 - Full Code.       Informed Consent- Anesthetic plan and risks discussed with patient and spouse.  I personally reviewed this patient with the CRNA. Discussed and agreed on the Anesthesia Plan with the CRNA..      NPO Status:  Vitals Value Taken Time   Date of last liquid 04/24/25 04/24/25 0211   Time of last liquid 0200 04/24/25 0211   Date of last solid 04/23/25 04/24/25 0211   Time of last solid 2100 04/24/25 0211

## 2025-04-24 NOTE — DISCHARGE SUMMARY
Discharge Summary - OB/GYN   Name: Genevieve Lyles 34 y.o. female I MRN: [id number]  Unit/Bed#: LD OVR I Date of Admission: 2025   Date of Service: 2025 I Hospital Day: 2    ADMISSION  Patient of: [facility]  Admission Date: 2025   Admitting Attending: Dr. Siu  Admitting Diagnoses:   Patient Active Problem List   Diagnosis    Postpartum hemorrhage    Post-term pregnancy, 40-42 weeks of gestation    Status post repeat low transverse  section    Uterine contractions    S/P  section    Acute post-operative pain       DELIVERY  Delivery Method: , Low Transverse   Delivery Date and Time: 2025 4:37 PM  Delivery Attending: Siena Roach    DISCHARGE  Discharge Date: 25  Discharge Attending: Dr. Roach  Discharge Diagnosis:   Same, Delivered    Clinical course: Admission to Delivery  Genevieve Lyles is a 34 y.o.  who was admitted at 41w2d for labor/TOLAC. She was 3/70/-3 on admission and received an epidural for pain control. She spontaneously ruptured for clear fluid. She continued to progress. On her next check, face presentation was noted and the decision was made to proceed to the OR for RLTCS for malpresentation.      Delivery  Route of Delivery: , Low Transverse  Reason for  delivery: Prior ;Other (Add Comments) 1 prior , face presentation    Anesthesia: Epidural,   QBL: 1136mL    Delivery: , Low Transverse at 2025 4:37 PM      Baby's Weight: 4309 g (9 lb 8 oz); 152    Apgar scores: 9  and 9  at 1 and 5 minutes, respectively    Clinical Course: Post-Delivery:  The post delivery course was remarkable for a postpartum hemorrhage. Hemoglobin went form 12.2 > 8.3, she received venofer.     On the day of discharge, the patient was ambulating, voiding spontaneously, tolerating oral intake, and hemodynamically stable. She was able to reasonably perform all ADLs. She had  appropriate bowel function. Pain was well-controlled. She was discharged home on postpartum/postop day #2 without complications. Patient was instructed to follow up with her OB as an outpatient and was given appropriate warnings to call her provider with problems or concerns.    Pertinent lab findings included:   Blood type A+.     Last three Hgb values:  Lab Results   Component Value Date    HGB 8.3 (L) 2025    HGB 12.1 2025    HGB 11.3 (L) 2025        Problem-specific follow-up plans included the following:  Assessment & Plan  Status post repeat low transverse  section  Routine postpartum care  Encourage ambulation  Encourage familial bonding  Lactation support as needed  Pain: Motrin/Tylenol around the clock, oxycodone if needed  Pre-delivery Hgb 12.1 --> Post Delivery 8.3 -> Venofer  Voiding spontaneously   DVT Ppx: ambulation, SCDs, Lovenox 40mg qd  Contra: condoms  Postpartum hemorrhage  QBL: 1136 mL in setting of bilateral extension  Hgb 12.1 > 8.3 > venofer  Asymptomatic      Discharge med list:       Medication List      START taking these medications     ibuprofen 600 mg tablet; Commonly known as: MOTRIN; Take 1 tablet (600   mg total) by mouth every 6 (six) hours   oxyCODONE 5 immediate release tablet; Commonly known as: ROXICODONE;   Take 1 tablet (5 mg total) by mouth every 4 (four) hours as needed for   severe pain for up to 10 days Max Daily Amount: 30 mg     CONTINUE taking these medications     ascorbic acid 250 mg tablet; Commonly known as: VITAMIN C   Cholecalciferol 1,000 units tablet; Commonly known as: VITAMIN D3   PRENATAL 1 PO       Condition at discharge:   stable     Disposition:   See After Visit Summary for discharge disposition information.    Planned Readmission:   Camila De Jesus MD   PGY-2, OBGYN

## 2025-04-24 NOTE — PLAN OF CARE
Problem: PAIN - ADULT  Goal: Verbalizes/displays adequate comfort level or baseline comfort level  Description: Interventions:- Encourage patient to monitor pain and request assistance- Assess pain using appropriate pain scale- Administer analgesics based on type and severity of pain and evaluate response- Implement non-pharmacological measures as appropriate and evaluate response- Consider cultural and social influences on pain and pain management- Notify physician/advanced practitioner if interventions unsuccessful or patient reports new pain  Outcome: Progressing     Problem: INFECTION - ADULT  Goal: Absence or prevention of progression during hospitalization  Description: INTERVENTIONS:- Assess and monitor for signs and symptoms of infection- Monitor lab/diagnostic results- Monitor all insertion sites, i.e. indwelling lines, tubes, and drains- Monitor endotracheal if appropriate and nasal secretions for changes in amount and color- [place] appropriate cooling/warming therapies per order- Administer medications as ordered- Instruct and encourage patient and family to use good hand hygiene technique- Identify and instruct in appropriate isolation precautions for identified infection/condition  Outcome: Progressing  Goal: Absence of fever/infection during neutropenic period  Description: INTERVENTIONS:- Monitor WBC  Outcome: Progressing     Problem: SAFETY ADULT  Goal: Patient will remain free of falls  Description: INTERVENTIONS:- Educate patient/family on patient safety including physical limitations- Instruct patient to call for assistance with activity - Consult OT/PT to assist with strengthening/mobility - Keep Call bell within reach- Keep bed low and locked with side rails adjusted as appropriate- Keep care items and personal belongings within reach- Initiate and maintain comfort rounds- Make Fall Risk Sign visible to staff-   Outcome: Progressing  Goal: Maintain or return to baseline ADL  function  Description: INTERVENTIONS:-  Assess patient's ability to carry out ADLs; assess patient's baseline for ADL function and identify physical deficits which impact ability to perform ADLs (bathing, care of mouth/teeth, toileting, grooming, dressing, etc.)- Assess/evaluate cause of self-care deficits - Assess range of motion- Assess patient's mobility; develop plan if impaired- Assess patient's need for assistive devices and provide as appropriate- Encourage maximum independence but intervene and supervise when necessary- Involve family in performance of ADLs- Assess for home care needs following discharge - Consider OT consult to assist with ADL evaluation and planning for discharge- Provide patient education as appropriate  Outcome: Progressing  Goal: Maintains/Returns to pre admission functional level  Outcome: Progressing     Problem: Knowledge Deficit  Goal: Patient/family/caregiver demonstrates understanding of disease process, treatment plan, medications, and discharge instructions  Description: Complete learning assessment and assess knowledge base.Interventions:- Provide teaching at level of understanding- Provide teaching via preferred learning methods  Outcome: Progressing     Problem: DISCHARGE PLANNING  Goal: Discharge to home or other facility with appropriate resources  Description: INTERVENTIONS:- Identify barriers to discharge w/patient and caregiver- Arrange for needed discharge resources and transportation as appropriate- Identify discharge learning needs (meds, wound care, etc.)- Arrange for interpretive services to assist at discharge as needed- Refer to Case Management Department for coordinating discharge planning if the patient needs post-hospital services based on physician/advanced practitioner order or complex needs related to functional status, cognitive ability, or social support system  Outcome: Progressing     Problem: BIRTH - VAGINAL/ SECTION  Goal: Fetal and maternal  status remain reassuring during the birth process  Description: INTERVENTIONS:- Monitor vital signs- Monitor fetal heart rate- Monitor uterine activity- Monitor labor progression (vaginal delivery)- DVT prophylaxis- Antibiotic prophylaxis  Outcome: Completed  Goal: Emotionally satisfying birthing experience for mother/fetus  Description: Interventions:- Assess, plan, implement and evaluate the nursing care given to the patient in labor- Advocate the philosophy that each childbirth experience is a unique experience and support the family's chosen level of involvement and control during the labor process - Actively participate in both the patient's and family's teaching of the birth process- Consider cultural, Muslim and age-specific factors and plan care for the patient in labor  Outcome: Completed

## 2025-04-24 NOTE — TELEPHONE ENCOUNTER
"FOLLOW UP: None needed    REASON FOR CONVERSATION: Laboring    SYMPTOMS: Contractions q 3 minutes    OTHER: , 41 weeks    DISPOSITION: Go to LD Now    Reason for Disposition   [1] History of prior delivery (multipara) AND [2] contractions < 10 minutes apart AND [3] present 1 hour    Answer Assessment - Initial Assessment Questions  1. ONSET: \"When did the symptoms begin?\"         Several hours ago     2. CONTRACTIONS: \"Describe the contractions that you are having.\" (e.g., duration, frequency, regularity, severity)      3 minutes apart for about 45 minutes   About 7 minutes apart consistently for 3-4 hours     3. PREGNANCY: \"How many weeks pregnant are you?\"      41w2d    4. ANIA: \"What date are you expecting to deliver?\"  4/15/25     5. PARITY: \"Have you had a baby before?\" If Yes, ask: \"How long did the labor last?\"          6. FETAL MOVEMENT: \"Has the baby's movement decreased or changed significantly from normal?\"      Good fetal movement     7. OTHER SYMPTOMS: \"Do you have any other symptoms?\" (e.g., leaking fluid from vagina, vaginal bleeding, fever, hand/facial swelling)      No ROM    Protocols used: Pregnancy - Labor-Adult-AH    "

## 2025-04-24 NOTE — ANESTHESIA POSTPROCEDURE EVALUATION
Post-Op Assessment Note    CV Status:  Stable  Pain Score: 0    Pain management: adequate      Post-op block assessment: catheter intact and no complications   Mental Status:  Alert and sleepy   Hydration Status:  Euvolemic   PONV Controlled:  Controlled   Airway Patency:  Patent     Post Op Vitals Reviewed: Yes    No anethesia notable event occurred.    Staff: CRNA           Last Filed PACU Vitals:  Vitals Value Taken Time   Temp     Pulse     BP     Resp     SpO2

## 2025-04-24 NOTE — OB LABOR/OXYTOCIN SAFETY PROGRESS
Labor Progress Note - Genevieve Lyles 34 y.o. female MRN: [id number]    Unit/Bed#: -01 Encounter: [id number]       Contraction Frequency (minutes): 2-3  Contraction Intensity: Moderate  Uterine Activity Characteristics: Regular  Cervical Dilation: 7        Cervical Effacement: 80  Fetal Station: -1  Baseline Rate (FHR): 115 bpm  Fetal Heart Rate (FHT): 125 BPM  FHR Category: I               Vital Signs:   Vitals:    04/24/25 1210   BP: 121/77   Pulse: 86   Resp:    Temp:    SpO2:        Notes/comments:   SROM confirmed on exam. FHT cat I. SVE as above. Compound presentation with fetal fingers on exam. Vertex presentation confirmed on TAUS. Will continue with expectant management. Dr. Roach aware.      Ana De Jesus MD 4/24/2025 12:48 PM

## 2025-04-24 NOTE — PLAN OF CARE
Problem: PAIN - ADULT      Goal: Verbalizes/displays adequate comfort level or baseline comfort level  Description: Interventions:- Encourage patient to monitor pain and request assistance- Assess pain using appropriate pain scale- Administer analgesics based on type and severity of pain and evaluate response- Implement non-pharmacological measures as appropriate and evaluate response- Consider cultural and social influences on pain and pain management- Notify physician/advanced practitioner if interventions unsuccessful or patient reports new pain  Outcome: Progressing     Problem: INFECTION - ADULT  Goal: Absence or prevention of progression during hospitalization  Description: INTERVENTIONS:- Assess and monitor for signs and symptoms of infection- Monitor lab/diagnostic results- Monitor all insertion sites, i.e. indwelling lines, tubes, and drains- Monitor endotracheal if appropriate and nasal secretions for changes in amount and color- [place] appropriate cooling/warming therapies per order- Administer medications as ordered- Instruct and encourage patient and family to use good hand hygiene technique- Identify and instruct in appropriate isolation precautions for identified infection/condition  Outcome: Progressing  Goal: Absence of fever/infection during neutropenic period  Description: INTERVENTIONS:- Monitor WBC  Outcome: Progressing     Problem: SAFETY ADULT  Goal: Patient will remain free of falls  Description: INTERVENTIONS:- Educate patient/family on patient safety including physical limitations- Instruct patient to call for assistance with activity - Consult OT/PT to assist with strengthening/mobility - Keep Call bell within reach- Keep bed low and locked with side rails adjusted as appropriate- Keep care items and personal belongings within reach- Initiate and maintain comfort rounds- Make Fall Risk Sign visible to staff- Offer Toileting every  Hours, in advance of need- Initiate/Maintain alarm-  Obtain necessary fall risk management equipment: - Apply yellow socks and bracelet for high fall risk patients- Consider moving patient to room near nurses station  Outcome: Progressing  Goal: Maintain or return to baseline ADL function  Description: INTERVENTIONS:-  Assess patient's ability to carry out ADLs; assess patient's baseline for ADL function and identify physical deficits which impact ability to perform ADLs (bathing, care of mouth/teeth, toileting, grooming, dressing, etc.)- Assess/evaluate cause of self-care deficits - Assess range of motion- Assess patient's mobility; develop plan if impaired- Assess patient's need for assistive devices and provide as appropriate- Encourage maximum independence but intervene and supervise when necessary- Involve family in performance of ADLs- Assess for home care needs following discharge - Consider OT consult to assist with ADL evaluation and planning for discharge- Provide patient education as appropriate  Outcome: Progressing  Goal: Maintains/Returns to pre admission functional level  Description: INTERVENTIONS:- Perform AM-PAC 6 Click Basic Mobility/ Daily Activity assessment daily.- Set and communicate daily mobility goal to care team and patient/family/caregiver. - Collaborate with rehabilitation services on mobility goals if consulted- Perform Range of Motion  times a day.- Reposition patient every  hours.- Dangle patient  times a day- Stand patient  times a day- Ambulate patient  times a day- Out of bed to chair  times a day - Out of bed for meals  times a day- Out of bed for toileting- Record patient progress and toleration of activity level   Outcome: Progressing     Problem: Knowledge Deficit  Goal: Patient/family/caregiver demonstrates understanding of disease process, treatment plan, medications, and discharge instructions  Description: Complete learning assessment and assess knowledge base.Interventions:- Provide teaching at level of understanding-  Provide teaching via preferred learning methods  Outcome: Progressing     Problem: DISCHARGE PLANNING  Goal: Discharge to home or other facility with appropriate resources  Description: INTERVENTIONS:- Identify barriers to discharge w/patient and caregiver- Arrange for needed discharge resources and transportation as appropriate- Identify discharge learning needs (meds, wound care, etc.)- Arrange for interpretive services to assist at discharge as needed- Refer to Case Management Department for coordinating discharge planning if the patient needs post-hospital services based on physician/advanced practitioner order or complex needs related to functional status, cognitive ability, or social support system  Outcome: Progressing     Problem: BIRTH - VAGINAL/ SECTION  Goal: Fetal and maternal status remain reassuring during the birth process  Description: INTERVENTIONS:- Monitor vital signs- Monitor fetal heart rate- Monitor uterine activity- Monitor labor progression (vaginal delivery)- DVT prophylaxis- Antibiotic prophylaxis  Outcome: Progressing  Goal: Emotionally satisfying birthing experience for mother/fetus  Description: Interventions:- Assess, plan, implement and evaluate the nursing care given to the patient in labor- Advocate the philosophy that each childbirth experience is a unique experience and support the family's chosen level of involvement and control during the labor process - Actively participate in both the patient's and family's teaching of the birth process- Consider cultural, Sabianist and age-specific factors and plan care for the patient in labor  Outcome: Progressing

## 2025-04-24 NOTE — ANESTHESIA PROCEDURE NOTES
Epidural Block    Patient location during procedure: OB/L&D  Start time: 4/24/2025 6:25 AM  Reason for block: procedure for pain  Staffing  Performed by: Evelin Pitts CRNA  Authorized by: Anna Hsieh MD    Preanesthetic Checklist  Completed: patient identified, IV checked, site marked, risks and benefits discussed, surgical consent, monitors and equipment checked, pre-op evaluation and timeout performed  Epidural  Patient position: sitting  Prep: ChloraPrep  Sedation Level: no sedation  Patient monitoring: frequent blood pressure checks, continuous pulse oximetry and heart rate  Approach: midline  Location: lumbar, L3-4  Injection technique: CRISTAL air  Needle  Needle type: Tuohy   Needle gauge: 17 G  Needle insertion depth: 5 cm  Catheter type: multi-orifice  Catheter size: 19 G  Catheter at skin depth: 10 cm  Catheter securement method: stabilization device and clear occlusive dressing  Test dose: negativelidocaine-epinephrine (XYLOCAINE-MPF/EPINEPHRINE) 1.5 %-1:200,000 injection 3 mL - Epidural   3 mL - 4/24/2025 6:27:00 AM  Assessment  Number of attempts: 1negative aspiration for CSF, negative aspiration for heme and no paresthesia on injection  patient tolerated the procedure well with no immediate complications  Additional Notes  Uncomplicated placement   Single skin poke   DPE using needle through needle technique with 25g spinal needle.   +CSF flow through spinal needle; no CSF through epidural needle  Cristal to air at 5 cm; Catheter threaded to 10  No evidence of immediate complications; patient tolerated the procedure well

## 2025-04-24 NOTE — QUICK NOTE
Patient is now 8-9/80/0. On exam, the presentation of the fetus is asynclitic, oblique mentum anterior with extended fetal head. Discussed with the patient that with mentum anterior presentation a vaginal delivery is possible. However, in her case, the baby is not directly mentum anterior, mentum is oblique vs transverse presentation and fetal head is extended. Unable to rotate fetal head on exam. We discussed that at this position without rotation, likelihood of successful vaginal delivery is low. Recommend repeat . Declines sterilization, unsure about future childbearing. I have asked Dr. Mark to also check the patient and he agrees with exam and recommend proceeding with  delivery.

## 2025-04-25 PROBLEM — G89.18 ACUTE POST-OPERATIVE PAIN: Status: ACTIVE | Noted: 2025-04-25

## 2025-04-25 LAB
ERYTHROCYTE [DISTWIDTH] IN BLOOD BY AUTOMATED COUNT: 13.7 % (ref 11.6–15.1)
HCT VFR BLD AUTO: 24.7 % (ref 34.8–46.1)
HGB BLD-MCNC: 8.3 G/DL (ref 11.5–15.4)
MCH RBC QN AUTO: 30.7 PG (ref 26.8–34.3)
MCHC RBC AUTO-ENTMCNC: 33.6 G/DL (ref 31.4–37.4)
MCV RBC AUTO: 92 FL (ref 82–98)
PLATELET # BLD AUTO: 109 THOUSANDS/UL (ref 149–390)
PMV BLD AUTO: 11.2 FL (ref 8.9–12.7)
RBC # BLD AUTO: 2.7 MILLION/UL (ref 3.81–5.12)
WBC # BLD AUTO: 13.25 THOUSAND/UL (ref 4.31–10.16)

## 2025-04-25 PROCEDURE — 99222 1ST HOSP IP/OBS MODERATE 55: CPT | Performed by: NURSE PRACTITIONER

## 2025-04-25 PROCEDURE — 99024 POSTOP FOLLOW-UP VISIT: CPT | Performed by: STUDENT IN AN ORGANIZED HEALTH CARE EDUCATION/TRAINING PROGRAM

## 2025-04-25 PROCEDURE — 85027 COMPLETE CBC AUTOMATED: CPT

## 2025-04-25 RX ORDER — OXYCODONE HYDROCHLORIDE 5 MG/1
5 TABLET ORAL EVERY 4 HOURS PRN
Refills: 0 | Status: DISCONTINUED | OUTPATIENT
Start: 2025-04-25 | End: 2025-04-26 | Stop reason: HOSPADM

## 2025-04-25 RX ORDER — ACETAMINOPHEN 325 MG/1
975 TABLET ORAL EVERY 6 HOURS SCHEDULED
Status: DISCONTINUED | OUTPATIENT
Start: 2025-04-25 | End: 2025-04-26 | Stop reason: HOSPADM

## 2025-04-25 RX ADMIN — KETOROLAC TROMETHAMINE 15 MG: 30 INJECTION, SOLUTION INTRAMUSCULAR; INTRAVENOUS at 06:00

## 2025-04-25 RX ADMIN — ACETAMINOPHEN 975 MG: 325 TABLET, FILM COATED ORAL at 12:20

## 2025-04-25 RX ADMIN — ENOXAPARIN SODIUM 40 MG: 40 INJECTION SUBCUTANEOUS at 09:58

## 2025-04-25 RX ADMIN — SENNOSIDES 8.6 MG: 8.6 TABLET, FILM COATED ORAL at 09:58

## 2025-04-25 RX ADMIN — IBUPROFEN 600 MG: 600 TABLET, FILM COATED ORAL at 23:31

## 2025-04-25 RX ADMIN — ACETAMINOPHEN 975 MG: 325 TABLET, FILM COATED ORAL at 23:31

## 2025-04-25 RX ADMIN — ACETAMINOPHEN 975 MG: 325 TABLET, FILM COATED ORAL at 17:32

## 2025-04-25 RX ADMIN — ACETAMINOPHEN 650 MG: 325 TABLET, FILM COATED ORAL at 06:00

## 2025-04-25 RX ADMIN — KETOROLAC TROMETHAMINE 15 MG: 30 INJECTION, SOLUTION INTRAMUSCULAR; INTRAVENOUS at 12:20

## 2025-04-25 RX ADMIN — IRON SUCROSE 300 MG: 20 INJECTION, SOLUTION INTRAVENOUS at 07:52

## 2025-04-25 RX ADMIN — KETOROLAC TROMETHAMINE 15 MG: 30 INJECTION, SOLUTION INTRAMUSCULAR; INTRAVENOUS at 17:32

## 2025-04-25 NOTE — PROGRESS NOTES
Obstetrics Progress Note  Genevieve Lyles 34 y.o. female MRN: [id number]  Unit/Bed#: -01 Encounter: [id number]    Assessment/Plan:  Postoperative day #1 s/p repeat low transverse  delivery. Stable.  Baby in room.   Assessment & Plan  Status post repeat low transverse  section  Routine postpartum care  Encourage ambulation  Encourage familial bonding  Lactation support as needed  Pain: Motrin/Tylenol around the clock, oxycodone if needed  Pre-delivery Hgb 12.1 --> Post Delivery 8.3 -> Venofer  Dunn catheter: removed this morning for VT   DVT Ppx: ambulation, SCDs, Lovenox 40mg qd  Postpartum hemorrhage  QBL: 1136 mL in setting of bilateral extension  Hgb 12.1 > 8.3 > venofer  Asymptomatic      Anticipate discharge POD #3 vs #4.      Subjective/Objective   Chief Complaint:   Post delivery.     Subjective:   Pain: well controlled. Tolerating PO: yes. Voiding: dunn removed for VT. Flatus: no. Bowel Movement: no. Ambulating: yes. Chest pain: no. Shortness of breath: no. Leg pain: no. Lochia: within normal limits. Infant feeding: breast.    Objective:   Vitals:   Temp:  [97.7 °F (36.5 °C)-98.8 °F (37.1 °C)] 98.1 °F (36.7 °C)  HR:  [] 74  BP: (106-131)/(55-89) 106/55  Resp:  [16-18] 16  SpO2:  [95 %-99 %] 97 %  O2 Device: None (Room air)     Intake/Output Summary (Last 24 hours) at 2025 0618  Last data filed at 2025 0433  Gross per 24 hour   Intake 1000 ml   Output 4086 ml   Net -3086 ml       Physical Exam:   -General: alert and oriented x 3, in no apparent distress  -Cardiovascular: regular rate, warm and well perfused  -Pulmonary: normal effort  -Abdomen/Pelvis: soft, non-tender, non-distended, no rebound or guarding. Uterine fundus firm and non-tender, at the umbilicus. Incision: C/D/I  -Extremities: Nontender    Lab Results   Component Value Date    WBC 13.25 (H) 2025    HGB 8.3 (L) 2025    HCT 24.7 (L) 2025    MCV 92 2025     (L) 2025          Ana De Jesus MD  PGY-2, OBGYN  4/25/2025, 6:18 AM

## 2025-04-25 NOTE — ASSESSMENT & PLAN NOTE
Routine postpartum care  Encourage ambulation  Encourage familial bonding  Lactation support as needed  Pain: Motrin/Tylenol around the clock, oxycodone if needed  Pre-delivery Hgb 12.1 --> Post Delivery 8.3 -> Venofer  Potts catheter: removed this morning for VT   DVT Ppx: ambulation, SCDs, Lovenox 40mg qd

## 2025-04-25 NOTE — PLAN OF CARE
Problem: PAIN - ADULT  Goal: Verbalizes/displays adequate comfort level or baseline comfort level  Description: Interventions:- Encourage patient to monitor pain and request assistance- Assess pain using appropriate pain scale- Administer analgesics based on type and severity of pain and evaluate response- Implement non-pharmacological measures as appropriate and evaluate response- Consider cultural and social influences on pain and pain management- Notify physician/advanced practitioner if interventions unsuccessful or patient reports new pain  Outcome: Progressing     Problem: INFECTION - ADULT  Goal: Absence or prevention of progression during hospitalization  Description: INTERVENTIONS:- Assess and monitor for signs and symptoms of infection- Monitor lab/diagnostic results- Monitor all insertion sites, i.e. indwelling lines, tubes, and drains- Monitor endotracheal if appropriate and nasal secretions for changes in amount and color- [place] appropriate cooling/warming therapies per order- Administer medications as ordered- Instruct and encourage patient and family to use good hand hygiene technique- Identify and instruct in appropriate isolation precautions for identified infection/condition  Outcome: Progressing  Goal: Absence of fever/infection during neutropenic period  Description: INTERVENTIONS:- Monitor WBC  Outcome: Progressing     Problem: SAFETY ADULT  Goal: Patient will remain free of falls  Description: INTERVENTIONS:- Educate patient/family on patient safety including physical limitations- Instruct patient to call for assistance with activity - Consult OT/PT to assist with strengthening/mobility - Keep Call bell within reach- Keep bed low and locked with side rails adjusted as appropriate- Keep care items and personal belongings within reach- Initiate and maintain comfort rounds  Outcome: Progressing  Goal: Maintain or return to baseline ADL function  Description: INTERVENTIONS:-  Assess patient's  ability to carry out ADLs; assess patient's baseline for ADL function and identify physical deficits which impact ability to perform ADLs (bathing, care of mouth/teeth, toileting, grooming, dressing, etc.)- Assess/evaluate cause of self-care deficits - Assess range of motion- Assess patient's mobility; develop plan if impaired- Assess patient's need for assistive devices and provide as appropriate- Encourage maximum independence but intervene and supervise when necessary- Involve family in performance of ADLs- Assess for home care needs following discharge - Consider OT consult to assist with ADL evaluation and planning for discharge- Provide patient education as appropriate  Outcome: Progressing  Goal: Maintains/Returns to pre admission functional level  Description: INTERVENTIONS:- Perform AM-PAC 6 Click Basic Mobility/ Daily Activity assessment daily.- Set and communicate daily mobility goal to care team and patient/family/caregiver. - Collaborate with rehabilitation services on mobility goals if consulted-  Outcome: Progressing     Problem: Knowledge Deficit  Goal: Patient/family/caregiver demonstrates understanding of disease process, treatment plan, medications, and discharge instructions  Description: Complete learning assessment and assess knowledge base.Interventions:- Provide teaching at level of understanding- Provide teaching via preferred learning methods  Outcome: Progressing     Problem: DISCHARGE PLANNING  Goal: Discharge to home or other facility with appropriate resources  Description: INTERVENTIONS:- Identify barriers to discharge w/patient and caregiver- Arrange for needed discharge resources and transportation as appropriate- Identify discharge learning needs (meds, wound care, etc.)- Arrange for interpretive services to assist at discharge as needed- Refer to Case Management Department for coordinating discharge planning if the patient needs post-hospital services based on physician/advanced  practitioner order or complex needs related to functional status, cognitive ability, or social support system  Outcome: Progressing

## 2025-04-25 NOTE — ASSESSMENT & PLAN NOTE
Status post repeat  on 2025   Neuraxial Duramorph:     Methadone and Duramorph Intra-procedure Administrations (last 48 hours) Showing orders from other encounters Morphine (Duramorph) may show additional administrations that are not neuraxial. Review route and comments.      Date/Time Action Medication Dose    25 1646 Given    morphine (PF) (DURAMORPH) injection 3 mg           Monitoring to continue for 24 hours post Duramorph administration:  Monitor pain, HR, BP, respirations, level of sedation and oxygen levels as ordered  Continuous pulse oximetry (or capnography) for 24 hours.  Notify Anesthesia/Acute Pain Services for the following:  Persistent pruritis, persistent nausea, or if respiratory rate is less than or equal to 10 breaths per minute or if pain is unrelieved or if patient is excessively sedated or O2 Sat less than 90% or EtCO2 outside the parameters of Respiratory Therapy Capnography/EtCO2 policy  No narcotics / sedatives / sleeping agents not ordered by Anesthesia/Acute Pain Service for 24 hours post duramorph  Benadryl 25 mg IV every 6 hours as needed for itching  Nubain 5 mg IV every 3 hours as needed for itching  Zofran 4 mg IV every 8 hours as needed for nausea or vomiting

## 2025-04-25 NOTE — CONSULTS
Consultation - Acute Pain   Name: Genevieve Lyles 34 y.o. female I MRN: [id number]  Unit/Bed#: -01 I Date of Admission: 2025   Date of Service: 2025 I Hospital Day: 1   Inpatient consult to Acute Pain Service  Consult performed by: ALEXX Morris  Consult ordered by: Anna Hsieh MD        Physician Requesting Evaluation: Siena Roach MD   Reason for Evaluation / Principal Problem: s/p duramorph; post-op    Assessment & Plan  Status post repeat low transverse  section  Status post repeat  on 2025   Neuraxial Duramorph:     Methadone and Duramorph Intra-procedure Administrations (last 48 hours) Showing orders from other encounters Morphine (Duramorph) may show additional administrations that are not neuraxial. Review route and comments.      Date/Time Action Medication Dose    25 1646 Given    morphine (PF) (DURAMORPH) injection 3 mg           Monitoring to continue for 24 hours post Duramorph administration:  Monitor pain, HR, BP, respirations, level of sedation and oxygen levels as ordered  Continuous pulse oximetry (or capnography) for 24 hours.  Notify Anesthesia/Acute Pain Services for the following:  Persistent pruritis, persistent nausea, or if respiratory rate is less than or equal to 10 breaths per minute or if pain is unrelieved or if patient is excessively sedated or O2 Sat less than 90% or EtCO2 outside the parameters of Respiratory Therapy Capnography/EtCO2 policy  No narcotics / sedatives / sleeping agents not ordered by Anesthesia/Acute Pain Service for 24 hours post duramorph  Benadryl 25 mg IV every 6 hours as needed for itching  Nubain 5 mg IV every 3 hours as needed for itching  Zofran 4 mg IV every 8 hours as needed for nausea or vomiting    Acute post-operative pain  Multimodal analgesic regimen:  Change Tylenol to 975 mg every 6 hours scheduled  Toradol 15 mg IV every 6 hours scheduled followed by  Motrin 600 mg  every 6 hours scheduled  Decrease oxycodone to 2.5 mg every 4 hours as needed for moderate pain  Decrease oxycodone 5 mg every 4 hours as needed for severe pain  Oxycodone scheduled to be available to start once Duramorph expires later this afternoon  Dilaudid 0.5 mg IV every 2 hours as needed for breakthrough pain x 24 hours, set to  today  Add Narcan as needed for open reversal agent/respiratory depression    Bowel regimen  Continue bowel regimen while on opioid medications to prevent opioid-induced constipation  Senokot 1 tablet daily    Pain well-controlled, no nausea, vomiting, itching given Duramorph administration.  Medication regimen adjusted.  Oxycodone available once Duramorph expires today.  Treatment plan and medications have been reviewed with patient, bedside nursing staff and primary care service.    APS will sign off at this time. Thank you for the consult. All opioids and other analgesics to be written at discretion of primary team. Please contact Acute Pain Service - via SecureChat from [phone] with additional questions or concerns. See [employer] or [email or web address] for additional contacts and after hours information.    History of Present Illness    HPI: Genevieve Lyles is a 34 y.o. year old female who presents status post repeat  on 2025    Current pain location(s): Pain Score: 0  Pain Location/Orientation: Location: Incision  Pain Scale: Pain Assessment Tool: 0-10  Current Analgesic regimen: At present time patient is ambulating room, denied pain in her abdomen/incision area..  Denies headache, dizziness, nausea, vomiting, lower extremity weakness or paresthesias and itching.  Overall feels well, tolerating diet.  Feels Tylenol and Toradol have been controlling any incisional discomfort.      I have reviewed the patient's controlled substance dispensing history in the Prescription Drug Monitoring Program in compliance with the [place] regulations before prescribing any controlled  substances.     Review of Systems   Gastrointestinal:  Negative for constipation, diarrhea, nausea and vomiting.   Neurological:  Negative for dizziness and headaches.   All other systems reviewed and are negative.    Historical Information   Past Medical History:   Diagnosis Date    Irregular menses     27-50 days     Miscarriage 2018    Varicella     as a child     Visual impairment     Glasses and contacts     Past Surgical History:   Procedure Laterality Date     SECTION  5/10/2022    FINGER SURGERY Right 2008    Ring Finger    [place]  DELIVERY ONLY N/A 05/10/2022    Procedure:  SECTION ();  Surgeon: Mary Ellen Ward MD;  Location: [facility] ;  Service: Obstetrics    WISDOM TOOTH EXTRACTION       Social History     Tobacco Use    Smoking status: Never    Smokeless tobacco: Never   Vaping Use    Vaping status: Never Used   Substance and Sexual Activity    Alcohol use: Not Currently     Alcohol/week: 2.0 standard drinks of alcohol     Comment: social    Drug use: Never     Comment: ilianamarijuana, pt's brother - marijuana    Sexual activity: Yes     Partners: Male     Birth control/protection: None     Comment: Jo      E-Cigarette/Vaping    E-Cigarette Use Never User      E-Cigarette/Vaping Substances    Nicotine No     THC No     CBD No     Flavoring No     Other No     Unknown No      Family history non-contributory  Social History     Tobacco Use    Smoking status: Never    Smokeless tobacco: Never   Vaping Use    Vaping status: Never Used   Substance and Sexual Activity    Alcohol use: Not Currently     Alcohol/week: 2.0 standard drinks of alcohol     Comment: social    Drug use: Never     Comment: ilianamarijuana, pt's brother - marijuana    Sexual activity: Yes     Partners: Male     Birth control/protection: None     Comment: Jo        Current Facility-Administered Medications:     acetaminophen (TYLENOL) tablet 650 mg, Q6H MITESH     benzocaine-menthol-lanolin-aloe (DERMOPLAST) 20-0.5 % topical spray 1 Application, Q6H PRN    calcium carbonate (TUMS) chewable tablet 1,000 mg, Daily PRN    diphenhydrAMINE (BENADRYL) injection 25 mg, Q6H PRN    enoxaparin (LOVENOX) subcutaneous injection 40 mg, Daily    hydrocortisone 1 % cream 1 Application, Daily PRN    HYDROmorphone (DILAUDID) injection 0.5 mg, Q2H PRN    ketorolac (TORADOL) injection 15 mg, Q6H MITESH **FOLLOWED BY** [START ON 4/26/2025] ibuprofen (MOTRIN) tablet 600 mg, Q6H    lactated ringers infusion, Continuous, Last Rate: 125 mL/hr (04/24/25 1100)    metoclopramide (REGLAN) injection 10 mg, Q6H PRN    nalbuphine (NUBAIN) injection 5 mg, Q3H PRN    ondansetron (ZOFRAN) injection 4 mg, Q8H PRN    oxyCODONE (ROXICODONE) immediate release tablet 10 mg, Q4H PRN    oxyCODONE (ROXICODONE) IR tablet 5 mg, Q4H PRN    senna (SENOKOT) tablet 8.6 mg, Daily    simethicone (MYLICON) chewable tablet 80 mg, 4x Daily PRN    witch hazel-glycerin (TUCKS) topical pad 1 Pad, Q4H PRN  Prior to Admission Medications   Prescriptions Last Dose Informant Patient Reported? Taking?   Cholecalciferol 25 MCG (1000 UT) tablet 4/23/2025 at  9:00 PM Self Yes Yes   Sig: Take 2,000 Units by mouth daily   Prenatal MV-Min-Fe Fum-FA-DHA (PRENATAL 1 PO) 4/23/2025 at  9:00 PM  Yes Yes   Sig: Take by mouth   ascorbic acid (VITAMIN C) 250 mg tablet 4/23/2025 at  9:00 PM  Yes Yes   Sig: Take 500 mg by mouth daily      Facility-Administered Medications: None     Patient has no known allergies.    Objective :  Temp:  [97.7 °F (36.5 °C)-98.8 °F (37.1 °C)] 98.4 °F (36.9 °C)  HR:  [] 84  BP: (106-131)/(55-77) 117/60  Resp:  [16-20] 20  SpO2:  [96 %-99 %] 98 %  O2 Device: None (Room air)    Physical Exam  Vitals reviewed.   Constitutional:       General: She is awake. She is not in acute distress.     Appearance: She is not ill-appearing, toxic-appearing or diaphoretic.   HENT:      Head: Normocephalic and atraumatic.      Nose: Nose  normal. No congestion or rhinorrhea.      Mouth/Throat:      Mouth: Mucous membranes are moist.   Eyes:      Extraocular Movements: Extraocular movements intact.   Cardiovascular:      Rate and Rhythm: Normal rate.   Pulmonary:      Effort: Pulmonary effort is normal. No tachypnea, bradypnea or respiratory distress.   Skin:     Coloration: Skin is not pale.   Neurological:      Mental Status: She is alert and oriented to person, place, and time. Mental status is at baseline.   Psychiatric:         Attention and Perception: Attention normal.         Mood and Affect: Mood normal. Mood is not anxious.         Speech: Speech normal.         Behavior: Behavior normal. Behavior is cooperative.          Lab Results: I have reviewed the following results:  CrCl cannot be calculated (Patient's most recent lab result is older than the maximum 7 days allowed.).  Lab Results   Component Value Date    WBC 13.25 (H) 04/25/2025    HGB 8.3 (L) 04/25/2025    HCT 24.7 (L) 04/25/2025     (L) 04/25/2025         Component Value Date/Time    K 4.0 03/15/2024 0921     03/15/2024 0921    CO2 27 03/15/2024 0921    BUN 8 03/15/2024 0921    CREATININE 0.59 03/15/2024 0921         Component Value Date/Time    CALCIUM 9.1 03/15/2024 0921    ALKPHOS 33 (L) 03/15/2024 0921    AST 17 03/15/2024 0921    ALT 17 03/15/2024 0921    TP 6.6 03/15/2024 0921    ALB 4.4 03/15/2024 0921       Imaging Results Review: No pertinent imaging studies reviewed.  Other Study Results Review: No additional pertinent studies reviewed.

## 2025-04-25 NOTE — ASSESSMENT & PLAN NOTE
Multimodal analgesic regimen:  Change Tylenol to 975 mg every 6 hours scheduled  Toradol 15 mg IV every 6 hours scheduled followed by  Motrin 600 mg every 6 hours scheduled  Decrease oxycodone to 2.5 mg every 4 hours as needed for moderate pain  Decrease oxycodone 5 mg every 4 hours as needed for severe pain  Oxycodone scheduled to be available to start once Duramorph expires later this afternoon  Dilaudid 0.5 mg IV every 2 hours as needed for breakthrough pain x 24 hours, set to  today  Add Narcan as needed for open reversal agent/respiratory depression    Bowel regimen  Continue bowel regimen while on opioid medications to prevent opioid-induced constipation  Senokot 1 tablet daily

## 2025-04-26 VITALS
HEART RATE: 90 BPM | WEIGHT: 205 LBS | RESPIRATION RATE: 18 BRPM | TEMPERATURE: 98.1 F | OXYGEN SATURATION: 97 % | DIASTOLIC BLOOD PRESSURE: 57 MMHG | BODY MASS INDEX: 30.36 KG/M2 | SYSTOLIC BLOOD PRESSURE: 119 MMHG | HEIGHT: 69 IN

## 2025-04-26 PROCEDURE — 99024 POSTOP FOLLOW-UP VISIT: CPT | Performed by: OBSTETRICS & GYNECOLOGY

## 2025-04-26 PROCEDURE — NC001 PR NO CHARGE: Performed by: OBSTETRICS & GYNECOLOGY

## 2025-04-26 RX ORDER — IBUPROFEN 600 MG/1
600 TABLET, FILM COATED ORAL EVERY 6 HOURS
Qty: 30 TABLET | Refills: 0 | Status: SHIPPED | OUTPATIENT
Start: 2025-04-26

## 2025-04-26 RX ORDER — OXYCODONE HYDROCHLORIDE 5 MG/1
5 TABLET ORAL EVERY 4 HOURS PRN
Qty: 10 TABLET | Refills: 0 | Status: SHIPPED | OUTPATIENT
Start: 2025-04-26 | End: 2025-05-06

## 2025-04-26 RX ADMIN — ACETAMINOPHEN 975 MG: 325 TABLET, FILM COATED ORAL at 06:07

## 2025-04-26 RX ADMIN — IBUPROFEN 600 MG: 600 TABLET, FILM COATED ORAL at 06:07

## 2025-04-26 RX ADMIN — ENOXAPARIN SODIUM 40 MG: 40 INJECTION SUBCUTANEOUS at 09:42

## 2025-04-26 RX ADMIN — SENNOSIDES 8.6 MG: 8.6 TABLET, FILM COATED ORAL at 09:42

## 2025-04-26 NOTE — PROGRESS NOTES
Obstetrics Progress Note  Genevieve Lyles 34 y.o. female MRN: [id number]  Unit/Bed#: -01 Encounter: [id number]    Assessment/Plan:  Postoperative day #2 s/p repeat low transverse  delivery. Stable.  Baby in room. By issue:  Assessment & Plan  Status post repeat low transverse  section  Routine postpartum care  Encourage ambulation  Encourage familial bonding  Lactation support as needed  Pain: Motrin/Tylenol around the clock, oxycodone if needed  Pre-delivery Hgb 12.1 --> Post Delivery 8.3 -> Venofer  Voiding spontaneously   DVT Ppx: ambulation, SCDs, Lovenox 40mg qd  Contra: condoms  Postpartum hemorrhage  QBL: 1136 mL in setting of bilateral extension  Hgb 12.1 > 8.3 > venofer  Asymptomatic      Anticipate discharge POD #2.      Subjective/Objective   Chief Complaint:   Post delivery.     Subjective:   Pain: well controlled. Tolerating PO: yes. Voiding: yes. Flatus: yes. Bowel Movement: no. Ambulating: yes. Chest pain: no. Shortness of breath: no. Leg pain: no. Lochia: within normal limits. Infant feeding: breast.    Objective:   Vitals:   Temp:  [97.9 °F (36.6 °C)-98.4 °F (36.9 °C)] 98.1 °F (36.7 °C)  HR:  [79-92] 86  BP: (109-121)/(55-66) 115/62  Resp:  [18-20] 18  SpO2:  [96 %-100 %] 97 %  O2 Device: None (Room air)     Intake/Output Summary (Last 24 hours) at 2025 0753  Last data filed at 2025 1330  Gross per 24 hour   Intake --   Output 1000 ml   Net -1000 ml       Physical Exam:   -General: alert and oriented x 3, in no apparent distress  -Cardiovascular: regular rate and rhythm  -Pulmonary: normal effort, clear to auscultation bilaterally  -Abdomen/Pelvis: soft, non-tender, non-distended, no rebound or guarding. Uterine fundus firm and non-tender, -1 cm below the umbilicus. Incision: C/D/I  -Extremities: Nontender    Lab Results   Component Value Date    WBC 13.25 (H) 2025    HGB 8.3 (L) 2025    HCT 24.7 (L) 2025    MCV 92 2025     (L)  04/25/2025         Ana De Jesus MD  PGY-2, OBGYN  4/26/2025, 7:53 AM

## 2025-04-26 NOTE — ASSESSMENT & PLAN NOTE
Routine postpartum care  Encourage ambulation  Encourage familial bonding  Lactation support as needed  Pain: Motrin/Tylenol around the clock, oxycodone if needed  Pre-delivery Hgb 12.1 --> Post Delivery 8.3 -> Venofer  Voiding spontaneously   DVT Ppx: ambulation, SCDs, Lovenox 40mg qd  Contra: condoms

## 2025-04-26 NOTE — PLAN OF CARE
Problem: PAIN - ADULT  Goal: Verbalizes/displays adequate comfort level or baseline comfort level  Description: Interventions:- Encourage patient to monitor pain and request assistance- Assess pain using appropriate pain scale- Administer analgesics based on type and severity of pain and evaluate response- Implement non-pharmacological measures as appropriate and evaluate response- Consider cultural and social influences on pain and pain management- Notify physician/advanced practitioner if interventions unsuccessful or patient reports new pain  Outcome: Progressing     Problem: INFECTION - ADULT  Goal: Absence or prevention of progression during hospitalization  Description: INTERVENTIONS:- Assess and monitor for signs and symptoms of infection- Monitor lab/diagnostic results- Monitor all insertion sites, i.e. indwelling lines, tubes, and drains- Monitor endotracheal if appropriate and nasal secretions for changes in amount and color- [place] appropriate cooling/warming therapies per order- Administer medications as ordered- Instruct and encourage patient and family to use good hand hygiene technique- Identify and instruct in appropriate isolation precautions for identified infection/condition  Outcome: Progressing  Goal: Absence of fever/infection during neutropenic period  Description: INTERVENTIONS:- Monitor WBC  Outcome: Progressing     Problem: SAFETY ADULT  Goal: Patient will remain free of falls  Description: INTERVENTIONS:- Educate patient/family on patient safety including physical limitations- Instruct patient to call for assistance with activity - Consult OT/PT to assist with strengthening/mobility - Keep Call bell within reach- Keep bed low and locked with side rails adjusted as appropriate- Keep care items and personal belongings within reach- Initiate and maintain comfort rounds- Make Fall Risk Sign visible to staff- Consider moving patient to room near nurses station  Outcome: Progressing  Goal:  Maintain or return to baseline ADL function  Description: INTERVENTIONS:-  Assess patient's ability to carry out ADLs; assess patient's baseline for ADL function and identify physical deficits which impact ability to perform ADLs (bathing, care of mouth/teeth, toileting, grooming, dressing, etc.)- Assess/evaluate cause of self-care deficits - Assess range of motion- Assess patient's mobility; develop plan if impaired- Assess patient's need for assistive devices and provide as appropriate- Encourage maximum independence but intervene and supervise when necessary- Involve family in performance of ADLs- Assess for home care needs following discharge - Consider OT consult to assist with ADL evaluation and planning for discharge- Provide patient education as appropriate  Outcome: Progressing  Goal: Maintains/Returns to pre admission functional level  Description: INTERVENTIONS:- Perform AM-PAC 6 Click Basic Mobility/ Daily Activity assessment daily.- Set and communicate daily mobility goal to care team and patient/family/caregiver. - Collaborate with rehabilitation services on mobility goals if consulted- Out of bed for toileting- Record patient progress and toleration of activity level   Outcome: Progressing     Problem: Knowledge Deficit  Goal: Patient/family/caregiver demonstrates understanding of disease process, treatment plan, medications, and discharge instructions  Description: Complete learning assessment and assess knowledge base.Interventions:- Provide teaching at level of understanding- Provide teaching via preferred learning methods  Outcome: Progressing     Problem: DISCHARGE PLANNING  Goal: Discharge to home or other facility with appropriate resources  Description: INTERVENTIONS:- Identify barriers to discharge w/patient and caregiver- Arrange for needed discharge resources and transportation as appropriate- Identify discharge learning needs (meds, wound care, etc.)- Arrange for interpretive services to  assist at discharge as needed- Refer to Case Management Department for coordinating discharge planning if the patient needs post-hospital services based on physician/advanced practitioner order or complex needs related to functional status, cognitive ability, or social support system  Outcome: Progressing

## 2025-04-29 NOTE — ANESTHESIA POSTPROCEDURE EVALUATION
Post-Op Assessment Note          Post-op block assessment: catheter intact    No anethesia notable event occurred.            Last Filed PACU Vitals:  Vitals Value Taken Time   Temp 97.7 °F (36.5 °C) 04/24/25 1745   Pulse 84 04/24/25 1915   /71 04/24/25 1915   Resp 18 04/24/25 1745   SpO2 97 % 04/24/25 1915       Modified Janelle:     Vitals Value Taken Time   Activity 2 04/24/25 1845   Respiration 2 04/24/25 1845   Circulation 2 04/24/25 1845   Consciousness 2 04/24/25 1845   Oxygen Saturation 2 04/24/25 1845     Modified Janelle Score: 10

## 2025-04-30 ENCOUNTER — RESULTS FOLLOW-UP (OUTPATIENT)
Dept: OBGYN CLINIC | Facility: CLINIC | Age: 35
End: 2025-04-30

## 2025-05-07 ENCOUNTER — TELEPHONE (OUTPATIENT)
Dept: OBGYN CLINIC | Facility: CLINIC | Age: 35
End: 2025-05-07

## 2025-05-07 NOTE — TELEPHONE ENCOUNTER
POSTPARTUM PHONE CALL ASSESSMENT    Date of Delivery:   Delivering Provider: GILL  Mode:   Delivery Notes/Complications: facial presentation   Do you still have bleeding/pain? If so, how much/how severe? Bleeding wnl pain is mild    Regular BMs/Urination? yes  Breastfeeding/Formula/Both? Breastfeeding   How are you doing emotionally? Feeling good   If struggling, obtain a EPDS Score: 0  Do you have any other questions or concerns for us or your provider? none  Have you scheduled the pediatrician appointment with pediatrician? yes  Do you have a postpartum visit scheduled? es   Date scheduled: 5/15 Provider: SHANDA

## 2025-05-07 NOTE — TELEPHONE ENCOUNTER
----- Message from Rosemary DANG sent at 4/28/2025  7:49 AM EDT -----  Regarding: PostPartum  delivered 4/24

## 2025-05-15 ENCOUNTER — POSTPARTUM VISIT (OUTPATIENT)
Dept: OBGYN CLINIC | Facility: CLINIC | Age: 35
End: 2025-05-15

## 2025-05-15 VITALS — SYSTOLIC BLOOD PRESSURE: 124 MMHG | DIASTOLIC BLOOD PRESSURE: 68 MMHG | WEIGHT: 186 LBS | BODY MASS INDEX: 27.47 KG/M2

## 2025-05-15 PROBLEM — O47.9 UTERINE CONTRACTIONS: Status: RESOLVED | Noted: 2025-04-24 | Resolved: 2025-05-15

## 2025-05-15 PROBLEM — Z98.891 S/P CESAREAN SECTION: Status: RESOLVED | Noted: 2025-04-24 | Resolved: 2025-05-15

## 2025-05-15 PROBLEM — Z98.891 STATUS POST REPEAT LOW TRANSVERSE CESAREAN SECTION: Status: RESOLVED | Noted: 2025-04-17 | Resolved: 2025-05-15

## 2025-05-15 PROBLEM — O48.0 POST-TERM PREGNANCY, 40-42 WEEKS OF GESTATION: Status: RESOLVED | Noted: 2025-04-17 | Resolved: 2025-05-15

## 2025-05-15 PROCEDURE — 99024 POSTOP FOLLOW-UP VISIT: CPT | Performed by: OBSTETRICS & GYNECOLOGY

## 2025-05-15 NOTE — PROGRESS NOTES
Genevieve uDbonnzo  1990    S:  34 y.o. female here for postpartum visit.  She is s/p  (repeat, failed TOLAC secondary to face presentation in labor) on 2025.   Gender: male   Apgars: 7/8  Weight: 9 lbs 8 oz  Her lochia has started to resolve.  Precautions given.   She is Breastfeeding without problems.   Her pregnancy was complicated by: history of prior .  She denies postpartum blues/depression.  Taylor score was 3.    We discussed contraceptive options in detail including birth control pills, patches, NuvaRing, DepoProvera, Mirena/Kyleena IUD, Nexplanon in detail.  At this point she would like condoms.     O:   She appears well and in no distress  Abdomen is soft and nontender  Pfannenstiel incision healing well; glue removed.      A/P:  Postpartum visit.   She will return in 2 months for her yearly exam, sooner PRN.     Referral to PT sent

## 2025-07-22 NOTE — PROGRESS NOTES
Name: Genveieve Lyles      : 1990      MRN: [id number]  Encounter Provider: ALEXX Ramos  Encounter Date: 2025   Encounter department: [facility]    :  Assessment & Plan  Encounter for gynecological examination (general) (routine) without abnormal findings  Annual GYN examination completed today.   Health maintenance reviewed/updated as appropriate  Risk prevention and anticipatory guidance provided including:  Age related Calcium and vitamin D intake  Encouraged Breast self exams and to call with changes.  Dietary and lifestyle recommendations based on her age and weight. body mass index is 26.73 kg/m²..    Tobacco and alcohol use, intervention ordered if applicable.   Condom use for prevention of STI's. declines STI Screening.                    History of Present Illness     Genevieve Lyles is a 35 y.o. female.She is here for Gynecologic Exam (Patient is here for a yearly exam, after delivery. /Delivered:   /Breast feeding  yes and going well /LMP: not since delivery /Concerns: none /Sex active: yes   STD testing: yes/no/Birth control: none /Last pap:  NILM,neg /PGM- breast cancer /EPDS: 4)      PP x 3+ months.  She is still breastfeeding and hasn't resumed menses yet.    She denies any C/O abnormal vaginal discharge or irritation. Denies Urinary complaints or breast changes    Sexually active: yes  Monogamous/single partner  Denies C/O related to intimacy/sexual activity  Contraception: condoms    She reports she feels safe at home.   Lifestyle/exercise: walking regular  Has appt for PT for diastasis  Calcium/vit D  intake: adequate      HEALTH MAINTENANCE SCREENINGS:     Previous pap smears and ASCCP screening guidelines have been reviewed.   Last Pap:  2022; Next due   History of abnormal pap: No      IMMUNIZATIONS  Gardasil HPV vaccine Was Not completed    Hereditary Cancer Screening  FH Breast cancer: PGM  FH  Ovarian cancer:no  FH Uterine cancer: no  FH Colon ca: no    Substance Abuse Screening Completed. See hx and flowsheet.    Review of Systems   Constitutional:  Negative for appetite change, chills, fatigue, fever and unexpected weight change.   HENT: Negative.     Eyes: Negative.    Respiratory:  Negative for chest tightness and shortness of breath.    Cardiovascular:  Negative for chest pain and palpitations.   Gastrointestinal:  Negative for abdominal pain, constipation and vomiting.   Genitourinary:         As per HPI   Skin:  Negative for color change and rash.   Neurological:  Negative for dizziness.   Psychiatric/Behavioral: Negative.       The following portions of the patient's history were reviewed and updated as appropriate: allergies, current medications, past family history, past medical history, past social history, past surgical history, and problem list.      Objective:   Objective   /68 (BP Location: Left arm, Patient Position: Sitting, Cuff Size: Standard)   Wt 82.1 kg (181 lb)   Breastfeeding Yes   BMI 26.73 kg/m²     Physical Exam  Constitutional:       General: She is not in acute distress.     Appearance: Normal appearance.   Genitourinary:      Vulva and rectum normal.      No lesions in the vagina.      Right Labia: No rash or lesions.     Left Labia: No lesions or rash.     No vaginal discharge, erythema, tenderness or bleeding.        Right Adnexa: not tender and no mass present.     Left Adnexa: not tender and no mass present.     No cervical motion tenderness, discharge or friability.      Uterus is not enlarged or tender.      No urethral prolapse present.      Pelvic exam was performed with patient in the lithotomy position.   Breasts:     Breasts are symmetrical.      Right: No inverted nipple, mass, nipple discharge, skin change or tenderness.      Left: No inverted nipple, mass, nipple discharge, skin change or tenderness.   HENT:      Head: Normocephalic and atraumatic.      Cardiovascular:      Rate and Rhythm: Normal rate.      Heart sounds: No murmur heard.  Pulmonary:      Effort: Pulmonary effort is normal.      Breath sounds: Normal breath sounds.   Abdominal:      General: There is no distension.      Palpations: Abdomen is soft.      Tenderness: There is no abdominal tenderness.     Musculoskeletal:         General: Normal range of motion.      Cervical back: Normal range of motion.   Lymphadenopathy:      Cervical: No cervical adenopathy.     Neurological:      Mental Status: She is alert and oriented to person, place, and time.     Skin:     General: Skin is warm and dry.     Psychiatric:         Mood and Affect: Mood normal.         Behavior: Behavior normal.   Vitals reviewed.

## 2025-07-24 ENCOUNTER — ANNUAL EXAM (OUTPATIENT)
Dept: OBGYN CLINIC | Facility: MEDICAL CENTER | Age: 35
End: 2025-07-24
Payer: COMMERCIAL

## 2025-07-24 VITALS — WEIGHT: 181 LBS | BODY MASS INDEX: 26.73 KG/M2 | SYSTOLIC BLOOD PRESSURE: 114 MMHG | DIASTOLIC BLOOD PRESSURE: 68 MMHG

## 2025-07-24 DIAGNOSIS — Z01.419 ENCOUNTER FOR GYNECOLOGICAL EXAMINATION (GENERAL) (ROUTINE) WITHOUT ABNORMAL FINDINGS: Primary | ICD-10-CM

## 2025-07-24 PROBLEM — G89.18 ACUTE POST-OPERATIVE PAIN: Status: RESOLVED | Noted: 2025-04-25 | Resolved: 2025-07-24

## 2025-07-24 PROCEDURE — S0612 ANNUAL GYNECOLOGICAL EXAMINA: HCPCS | Performed by: CLINICAL NURSE SPECIALIST

## 2025-08-11 ENCOUNTER — EVALUATION (OUTPATIENT)
Dept: PHYSICAL THERAPY | Facility: CLINIC | Age: 35
End: 2025-08-11
Attending: OBSTETRICS & GYNECOLOGY
Payer: COMMERCIAL

## 2025-08-18 ENCOUNTER — OFFICE VISIT (OUTPATIENT)
Dept: PHYSICAL THERAPY | Facility: CLINIC | Age: 35
End: 2025-08-18
Attending: OBSTETRICS & GYNECOLOGY
Payer: COMMERCIAL

## 2025-08-18 DIAGNOSIS — M62.08 DIASTASIS RECTI: ICD-10-CM

## 2025-08-18 DIAGNOSIS — R19.8 ABDOMINAL WEAKNESS: ICD-10-CM

## 2025-08-18 PROCEDURE — 97112 NEUROMUSCULAR REEDUCATION: CPT

## (undated) DEVICE — SKIN MARKER DUAL TIP WITH RULER CAP, FLEXIBLE RULER AND LABELS: Brand: DEVON

## (undated) DEVICE — GLOVE PI ULTRA TOUCH SZ 6

## (undated) DEVICE — CHLORAPREP HI-LITE 26ML ORANGE

## (undated) DEVICE — SUT MONOCRYL 0 CTX 36 IN Y398H

## (undated) DEVICE — SUT MONOCRYL 3-0 UNDYED KS CS-1 Y523H

## (undated) DEVICE — ABDOMINAL PAD: Brand: DERMACEA

## (undated) DEVICE — SUT VICRYL 0 CTX 36 IN J978H

## (undated) DEVICE — SUT VICRYL 0 CT-1 36 IN J946H

## (undated) DEVICE — GLOVE INDICATOR PI UNDERGLOVE SZ 7 BLUE

## (undated) DEVICE — Device

## (undated) DEVICE — SUT PLAIN 2-0 CTX 27 IN 872H

## (undated) DEVICE — PACK C-SECTION PBDS

## (undated) DEVICE — SUT MONOCRYL 4-0 PS-2 27 IN Y426H

## (undated) DEVICE — SPONGE SCRUB 4 PCT CHLORHEXIDINE

## (undated) DEVICE — TELFA NON-ADHERENT ABSORBENT DRESSING: Brand: TELFA

## (undated) DEVICE — GAUZE SPONGES,16 PLY: Brand: CURITY

## (undated) DEVICE — SUT MONOCRYL 2-0 CT-1 36 IN Y945H

## (undated) DEVICE — DRESSING TELFA 2 X 3 IN STRL

## (undated) DEVICE — GLOVE PI ULTRA TOUCH SZ.6.5

## (undated) DEVICE — LAPAROTOMY SPONGE - RF AND X-RAY DETECTABLE PRE-WASHED: Brand: SITUATE